# Patient Record
Sex: MALE | Race: BLACK OR AFRICAN AMERICAN | NOT HISPANIC OR LATINO | Employment: FULL TIME | URBAN - METROPOLITAN AREA
[De-identification: names, ages, dates, MRNs, and addresses within clinical notes are randomized per-mention and may not be internally consistent; named-entity substitution may affect disease eponyms.]

---

## 2022-08-08 ENCOUNTER — HOSPITAL ENCOUNTER (INPATIENT)
Facility: HOSPITAL | Age: 47
LOS: 14 days | Discharge: HOME | DRG: 065 | End: 2022-08-22
Attending: STUDENT IN AN ORGANIZED HEALTH CARE EDUCATION/TRAINING PROGRAM | Admitting: INTERNAL MEDICINE
Payer: COMMERCIAL

## 2022-08-08 DIAGNOSIS — R51.9 ACUTE NONINTRACTABLE HEADACHE, UNSPECIFIED HEADACHE TYPE: Primary | ICD-10-CM

## 2022-08-08 DIAGNOSIS — I60.9 SAH (SUBARACHNOID HEMORRHAGE): ICD-10-CM

## 2022-08-08 LAB
ABORH RETYPE: NORMAL
ALBUMIN SERPL-MCNC: 4.3 GM/DL (ref 3.5–5)
ALBUMIN/GLOB SERPL: 1.4 RATIO (ref 1.1–2)
ALP SERPL-CCNC: 57 UNIT/L (ref 40–150)
ALT SERPL-CCNC: 14 UNIT/L (ref 0–55)
AST SERPL-CCNC: 16 UNIT/L (ref 5–34)
BASOPHILS # BLD AUTO: 0.05 X10(3)/MCL (ref 0–0.2)
BASOPHILS NFR BLD AUTO: 0.5 %
BILIRUBIN DIRECT+TOT PNL SERPL-MCNC: 1.2 MG/DL
BUN SERPL-MCNC: 14.9 MG/DL (ref 8.9–20.6)
CALCIUM SERPL-MCNC: 9.1 MG/DL (ref 8.4–10.2)
CHLORIDE SERPL-SCNC: 104 MMOL/L (ref 98–107)
CHOLEST SERPL-MCNC: 178 MG/DL
CHOLEST/HDLC SERPL: 4 {RATIO} (ref 0–5)
CO2 SERPL-SCNC: 28 MMOL/L (ref 22–29)
CREAT SERPL-MCNC: 1.02 MG/DL (ref 0.73–1.18)
EOSINOPHIL # BLD AUTO: 0.04 X10(3)/MCL (ref 0–0.9)
EOSINOPHIL NFR BLD AUTO: 0.4 %
ERYTHROCYTE [DISTWIDTH] IN BLOOD BY AUTOMATED COUNT: 14.1 % (ref 11.5–17)
EST. AVERAGE GLUCOSE BLD GHB EST-MCNC: 114 MG/DL
GFR SERPLBLD CREATININE-BSD FMLA CKD-EPI: >60 MLS/MIN/1.73/M2
GLOBULIN SER-MCNC: 3.1 GM/DL (ref 2.4–3.5)
GLUCOSE SERPL-MCNC: 116 MG/DL (ref 70–110)
GLUCOSE SERPL-MCNC: 152 MG/DL (ref 74–100)
GROUP & RH: NORMAL
HBA1C MFR BLD: 5.6 %
HCT VFR BLD AUTO: 44.6 % (ref 42–52)
HDLC SERPL-MCNC: 46 MG/DL (ref 35–60)
HGB BLD-MCNC: 14 GM/DL (ref 14–18)
IMM GRANULOCYTES # BLD AUTO: 0.05 X10(3)/MCL (ref 0–0.04)
IMM GRANULOCYTES NFR BLD AUTO: 0.5 %
INDIRECT COOMBS GEL: NORMAL
INR BLD: 1.05 (ref 0–1.3)
LDLC SERPL CALC-MCNC: 119 MG/DL (ref 50–140)
LYMPHOCYTES # BLD AUTO: 1.21 X10(3)/MCL (ref 0.6–4.6)
LYMPHOCYTES NFR BLD AUTO: 12.4 %
MCH RBC QN AUTO: 26 PG (ref 27–31)
MCHC RBC AUTO-ENTMCNC: 31.4 MG/DL (ref 33–36)
MCV RBC AUTO: 82.7 FL (ref 80–94)
MONOCYTES # BLD AUTO: 0.37 X10(3)/MCL (ref 0.1–1.3)
MONOCYTES NFR BLD AUTO: 3.8 %
NEUTROPHILS # BLD AUTO: 8 X10(3)/MCL (ref 2.1–9.2)
NEUTROPHILS NFR BLD AUTO: 82.4 %
NRBC BLD AUTO-RTO: 0 %
PLATELET # BLD AUTO: 231 X10(3)/MCL (ref 130–400)
PMV BLD AUTO: 10.8 FL (ref 7.4–10.4)
POTASSIUM SERPL-SCNC: 3.3 MMOL/L (ref 3.5–5.1)
PROT SERPL-MCNC: 7.4 GM/DL (ref 6.4–8.3)
PROTHROMBIN TIME: 13.6 SECONDS (ref 12.5–14.5)
RBC # BLD AUTO: 5.39 X10(6)/MCL (ref 4.7–6.1)
SARS-COV-2 RDRP RESP QL NAA+PROBE: NEGATIVE
SODIUM SERPL-SCNC: 140 MMOL/L (ref 136–145)
TRIGL SERPL-MCNC: 67 MG/DL (ref 34–140)
TSH SERPL-ACNC: 0.87 UIU/ML (ref 0.35–4.94)
VLDLC SERPL CALC-MCNC: 13 MG/DL
WBC # SPEC AUTO: 9.7 X10(3)/MCL (ref 4.5–11.5)

## 2022-08-08 PROCEDURE — 85610 PROTHROMBIN TIME: CPT | Performed by: STUDENT IN AN ORGANIZED HEALTH CARE EDUCATION/TRAINING PROGRAM

## 2022-08-08 PROCEDURE — 96374 THER/PROPH/DIAG INJ IV PUSH: CPT

## 2022-08-08 PROCEDURE — 25000003 PHARM REV CODE 250: Performed by: INTERNAL MEDICINE

## 2022-08-08 PROCEDURE — 25000003 PHARM REV CODE 250: Performed by: STUDENT IN AN ORGANIZED HEALTH CARE EDUCATION/TRAINING PROGRAM

## 2022-08-08 PROCEDURE — 63600175 PHARM REV CODE 636 W HCPCS: Performed by: STUDENT IN AN ORGANIZED HEALTH CARE EDUCATION/TRAINING PROGRAM

## 2022-08-08 PROCEDURE — 99223 PR INITIAL HOSPITAL CARE,LEVL III: ICD-10-PCS | Mod: FS,,, | Performed by: NEUROLOGICAL SURGERY

## 2022-08-08 PROCEDURE — 96375 TX/PRO/DX INJ NEW DRUG ADDON: CPT

## 2022-08-08 PROCEDURE — 20000000 HC ICU ROOM

## 2022-08-08 PROCEDURE — 36415 COLL VENOUS BLD VENIPUNCTURE: CPT | Performed by: STUDENT IN AN ORGANIZED HEALTH CARE EDUCATION/TRAINING PROGRAM

## 2022-08-08 PROCEDURE — 80053 COMPREHEN METABOLIC PANEL: CPT | Performed by: STUDENT IN AN ORGANIZED HEALTH CARE EDUCATION/TRAINING PROGRAM

## 2022-08-08 PROCEDURE — 87635 SARS-COV-2 COVID-19 AMP PRB: CPT | Performed by: STUDENT IN AN ORGANIZED HEALTH CARE EDUCATION/TRAINING PROGRAM

## 2022-08-08 PROCEDURE — 96361 HYDRATE IV INFUSION ADD-ON: CPT

## 2022-08-08 PROCEDURE — 84443 ASSAY THYROID STIM HORMONE: CPT | Performed by: STUDENT IN AN ORGANIZED HEALTH CARE EDUCATION/TRAINING PROGRAM

## 2022-08-08 PROCEDURE — 83036 HEMOGLOBIN GLYCOSYLATED A1C: CPT | Performed by: STUDENT IN AN ORGANIZED HEALTH CARE EDUCATION/TRAINING PROGRAM

## 2022-08-08 PROCEDURE — 93010 EKG 12-LEAD: ICD-10-PCS | Mod: ,,, | Performed by: INTERNAL MEDICINE

## 2022-08-08 PROCEDURE — 80061 LIPID PANEL: CPT | Performed by: STUDENT IN AN ORGANIZED HEALTH CARE EDUCATION/TRAINING PROGRAM

## 2022-08-08 PROCEDURE — 93010 ELECTROCARDIOGRAM REPORT: CPT | Mod: ,,, | Performed by: INTERNAL MEDICINE

## 2022-08-08 PROCEDURE — 25500020 PHARM REV CODE 255: Performed by: STUDENT IN AN ORGANIZED HEALTH CARE EDUCATION/TRAINING PROGRAM

## 2022-08-08 PROCEDURE — 85025 COMPLETE CBC W/AUTO DIFF WBC: CPT | Performed by: STUDENT IN AN ORGANIZED HEALTH CARE EDUCATION/TRAINING PROGRAM

## 2022-08-08 PROCEDURE — 93005 ELECTROCARDIOGRAM TRACING: CPT

## 2022-08-08 PROCEDURE — 99291 CRITICAL CARE FIRST HOUR: CPT | Mod: 25

## 2022-08-08 PROCEDURE — 99223 1ST HOSP IP/OBS HIGH 75: CPT | Mod: FS,,, | Performed by: NEUROLOGICAL SURGERY

## 2022-08-08 PROCEDURE — 86901 BLOOD TYPING SEROLOGIC RH(D): CPT | Performed by: STUDENT IN AN ORGANIZED HEALTH CARE EDUCATION/TRAINING PROGRAM

## 2022-08-08 PROCEDURE — 92523 SPEECH SOUND LANG COMPREHEN: CPT

## 2022-08-08 PROCEDURE — 25000003 PHARM REV CODE 250: Performed by: NEUROLOGICAL SURGERY

## 2022-08-08 RX ORDER — NIMODIPINE 30 MG/1
60 CAPSULE, LIQUID FILLED ORAL EVERY 4 HOURS
Status: DISCONTINUED | OUTPATIENT
Start: 2022-08-08 | End: 2022-08-22 | Stop reason: HOSPADM

## 2022-08-08 RX ORDER — MORPHINE SULFATE 4 MG/ML
4 INJECTION, SOLUTION INTRAMUSCULAR; INTRAVENOUS
Status: COMPLETED | OUTPATIENT
Start: 2022-08-08 | End: 2022-08-08

## 2022-08-08 RX ORDER — ONDANSETRON 2 MG/ML
8 INJECTION INTRAMUSCULAR; INTRAVENOUS EVERY 6 HOURS PRN
Status: DISCONTINUED | OUTPATIENT
Start: 2022-08-08 | End: 2022-08-22 | Stop reason: HOSPADM

## 2022-08-08 RX ORDER — SODIUM CHLORIDE 9 MG/ML
INJECTION, SOLUTION INTRAVENOUS CONTINUOUS
Status: DISCONTINUED | OUTPATIENT
Start: 2022-08-08 | End: 2022-08-22 | Stop reason: HOSPADM

## 2022-08-08 RX ORDER — SODIUM CHLORIDE 0.9 % (FLUSH) 0.9 %
10 SYRINGE (ML) INJECTION
Status: DISCONTINUED | OUTPATIENT
Start: 2022-08-08 | End: 2022-08-22 | Stop reason: HOSPADM

## 2022-08-08 RX ORDER — LABETALOL HYDROCHLORIDE 5 MG/ML
10 INJECTION, SOLUTION INTRAVENOUS
Status: DISCONTINUED | OUTPATIENT
Start: 2022-08-08 | End: 2022-08-22 | Stop reason: HOSPADM

## 2022-08-08 RX ORDER — ONDANSETRON 2 MG/ML
4 INJECTION INTRAMUSCULAR; INTRAVENOUS
Status: COMPLETED | OUTPATIENT
Start: 2022-08-08 | End: 2022-08-08

## 2022-08-08 RX ORDER — HYDRALAZINE HYDROCHLORIDE 20 MG/ML
10 INJECTION INTRAMUSCULAR; INTRAVENOUS
Status: DISCONTINUED | OUTPATIENT
Start: 2022-08-08 | End: 2022-08-22 | Stop reason: HOSPADM

## 2022-08-08 RX ORDER — LEVETIRACETAM 500 MG/5ML
500 INJECTION, SOLUTION, CONCENTRATE INTRAVENOUS EVERY 12 HOURS
Status: DISCONTINUED | OUTPATIENT
Start: 2022-08-08 | End: 2022-08-22 | Stop reason: HOSPADM

## 2022-08-08 RX ORDER — SODIUM CHLORIDE 9 MG/ML
1000 INJECTION, SOLUTION INTRAVENOUS
Status: COMPLETED | OUTPATIENT
Start: 2022-08-08 | End: 2022-08-08

## 2022-08-08 RX ORDER — MORPHINE SULFATE 4 MG/ML
2 INJECTION, SOLUTION INTRAMUSCULAR; INTRAVENOUS EVERY 4 HOURS PRN
Status: DISCONTINUED | OUTPATIENT
Start: 2022-08-08 | End: 2022-08-22 | Stop reason: HOSPADM

## 2022-08-08 RX ORDER — ONDANSETRON 2 MG/ML
4 INJECTION INTRAMUSCULAR; INTRAVENOUS EVERY 6 HOURS PRN
Status: DISCONTINUED | OUTPATIENT
Start: 2022-08-08 | End: 2022-08-08

## 2022-08-08 RX ORDER — POTASSIUM CHLORIDE 20 MEQ/1
40 TABLET, EXTENDED RELEASE ORAL ONCE
Status: COMPLETED | OUTPATIENT
Start: 2022-08-08 | End: 2022-08-08

## 2022-08-08 RX ADMIN — NIMODIPINE 60 MG: 30 CAPSULE, LIQUID FILLED ORAL at 06:08

## 2022-08-08 RX ADMIN — MORPHINE SULFATE 2 MG: 4 INJECTION INTRAVENOUS at 02:08

## 2022-08-08 RX ADMIN — NIMODIPINE 60 MG: 30 CAPSULE, LIQUID FILLED ORAL at 02:08

## 2022-08-08 RX ADMIN — NIMODIPINE 60 MG: 30 CAPSULE, LIQUID FILLED ORAL at 09:08

## 2022-08-08 RX ADMIN — ONDANSETRON 4 MG: 2 INJECTION INTRAMUSCULAR; INTRAVENOUS at 04:08

## 2022-08-08 RX ADMIN — NIMODIPINE 60 MG: 30 CAPSULE, LIQUID FILLED ORAL at 10:08

## 2022-08-08 RX ADMIN — SODIUM CHLORIDE: 9 INJECTION, SOLUTION INTRAVENOUS at 05:08

## 2022-08-08 RX ADMIN — LEVETIRACETAM 500 MG: 100 INJECTION, SOLUTION, CONCENTRATE INTRAVENOUS at 09:08

## 2022-08-08 RX ADMIN — ONDANSETRON 8 MG: 2 INJECTION INTRAMUSCULAR; INTRAVENOUS at 09:08

## 2022-08-08 RX ADMIN — NIMODIPINE 60 MG: 30 CAPSULE, LIQUID FILLED ORAL at 05:08

## 2022-08-08 RX ADMIN — HYDRALAZINE HYDROCHLORIDE 10 MG: 20 INJECTION INTRAMUSCULAR; INTRAVENOUS at 09:08

## 2022-08-08 RX ADMIN — ONDANSETRON 4 MG: 2 INJECTION INTRAMUSCULAR; INTRAVENOUS at 06:08

## 2022-08-08 RX ADMIN — ONDANSETRON 4 MG: 2 INJECTION INTRAMUSCULAR; INTRAVENOUS at 02:08

## 2022-08-08 RX ADMIN — IOPAMIDOL 100 ML: 755 INJECTION, SOLUTION INTRAVENOUS at 05:08

## 2022-08-08 RX ADMIN — SODIUM CHLORIDE 1000 ML: 9 INJECTION, SOLUTION INTRAVENOUS at 04:08

## 2022-08-08 RX ADMIN — POTASSIUM CHLORIDE 40 MEQ: 1500 TABLET, EXTENDED RELEASE ORAL at 12:08

## 2022-08-08 RX ADMIN — MORPHINE SULFATE 4 MG: 4 INJECTION INTRAVENOUS at 04:08

## 2022-08-08 RX ADMIN — ONDANSETRON 4 MG: 2 INJECTION INTRAMUSCULAR; INTRAVENOUS at 12:08

## 2022-08-08 NOTE — H&P
[facility] - Emergency Dept  Pulmonary Critical Care Note    Patient Name: Canelo Blank  MRN: [id number]  Admission Date: 8/8/2022  Hospital Length of Stay: 0 days  Code Status: Full Code  Attending Provider:Angy Wong MD  Primary Care Provider: No primary care provider on file.     Subjective:     HPI:   46 AAF with no reported PMH presented for sudden-on headache. Patient was watching TV, laying in bed around 12AM when he started experiencing intense pressure/pain in the occipital region radiating to the neck a/w dizziness/N/V for which he took 2 enma's aspirin and heating pack with some improvement, but ultimately decided to come to the ED accompanied by wife due to persistence of HA. No prior episodes. No bowel or bladder dysfunction. In the ED imaging CTH revealed subtle SAH with obstructing hydrocephalus above 4th ventricle. NSGY, Dr. Buchanan consulted by ED physician, recommended CTA head and neck for now and ICU admission for q1h neurochecks.     Hospital Course/Significant events: HA improved with one time administration of morphine 4mg IV, nausea better controlled with Zofran administration.       24 Hour Interval History: N/A      No past medical history on file.    No past surgical history on file.     Social History     Socioeconomic History    Marital status:            Objective:   No current outpatient medications    Current Inpatient Medications   niMODipine  60 mg Oral Q4H     Infusions    PRN  sodium chloride 0.9%      Intake/Output Summary (Last 24 hours) at 8/8/2022 0558  Last data filed at 8/8/2022 0529  Gross per 24 hour   Intake 0 ml   Output --   Net 0 ml       Review of Systems   Constitutional: Positive for diaphoresis. Negative for chills and fever.   HENT: Negative for hearing loss and tinnitus.    Eyes: Negative for blurred vision, double vision, photophobia and pain.   Respiratory: Negative for cough and shortness of breath.    Cardiovascular: Negative for chest  pain and palpitations.   Gastrointestinal: Positive for nausea and vomiting.   Genitourinary: Negative for dysuria, frequency and urgency.   Musculoskeletal: Positive for neck pain.   Neurological: Positive for headaches. Negative for dizziness, tingling, focal weakness, seizures, loss of consciousness and weakness.   Psychiatric/Behavioral: Negative for hallucinations. The patient is not nervous/anxious.           Vital Signs (Most Recent):  Temp: 98.1 °F (36.7 °C) (08/08/22 0419)  Pulse: 69 (08/08/22 0501)  Resp: 19 (08/08/22 0445)  BP: (!) 153/102 (08/08/22 0547)  SpO2: 99 % (08/08/22 0501)  Body mass index is 25.31 kg/m².  Weight: 68.9 kg (151 lb 14.4 oz) Vital Signs (24h Range):  Temp:  [98.1 °F (36.7 °C)] 98.1 °F (36.7 °C)  Pulse:  [69-76] 69  Resp:  [19-20] 19  SpO2:  [99 %-100 %] 99 %  BP: (141-156)/() 153/102     Physical Exam  General:  Well-nourished, well-developed, no acute distress, slightly uncomfortable appearing due to nausea  HEENT: Normocephalic, atraumatic. EOMI.   Neck: Supple. No obvious JVD.  Normal range of motion.  Respiratory: CTAB.  No obvious crackles wheeze or rhonchi.  Normal work of breathing.  Cardiovascular:  RRR.  No obvious M/G/R. Warm extremities.  Peripheral pulses intact.  No edema.  Gastrointestinal:  Soft, nontender, nondistended.  Normal bowel sounds.  Neurologic: ANOx3.  Answering questions/following commands appropriately.  No FND.  HOB elevated.      Mechanical ventilation support: on room air       Lines/Drains/Airways     Peripheral Intravenous Line  Duration                Peripheral IV - Single Lumen 08/08/22 0435 20 G Anterior;Proximal;Right Forearm <1 day                Significant Labs:    Lab Results   Component Value Date    WBC 9.7 08/08/2022    HGB 14.0 08/08/2022    HCT 44.6 08/08/2022    MCV 82.7 08/08/2022     08/08/2022         BMP  Lab Results   Component Value Date     08/08/2022    K 3.3 (L) 08/08/2022    CO2 28 08/08/2022    BUN 14.9  08/08/2022    CREATININE 1.02 08/08/2022    CALCIUM 9.1 08/08/2022       ABG  No results for input(s): PH, PO2, PCO2, HCO3, BE in the last 168 hours.        Significant Imaging:  I have reviewed all pertinent imaging within the past 24 hours.    CT Head Without Contrast         CTA Head and Neck (xpd)    (Results Pending)   X-Ray Chest 1 View    (Results Pending)       Microbiology Results (last 7 days)     ** No results found for the last 168 hours. **          Assessment/Plan:     Assessment    1. Small SAH with evidence of obstruction hydrocephalus given blood in 4th ventricle and enlarged 3rd/4th ventricle  a. Associated with sudden on-set headache, N/V    Plan  -Continue close hemodynamic monitoring and q1h neurochecks in the ICU  -CTA ordered, pending read  -Nimodipine to prevent vasospasms  -Keep SBP between 140-160s ( Can initiate Cleviprex gtt if PRN ineffective, BP at goal currently  -Elevated HOB 30 deg, NPO until evaluated by NSGY ( Dr. Buchanan)  -Seizure ppx with Keppra 500 BID for now  -Zofran for antiemetic, morphine for pain control    Consults: NSGY  DVT Prophylaxis:SCDS, holding AC due to risk of evolving SAH  GI Prophylaxis: none    CODE STATUS: FULL    Greater than 30 minutes of critical care was time spent personally by me on the following activities: development of treatment plan with patient or surrogate and bedside caregivers, discussions with consultants, evaluation of patient's response to treatment, examination of patient, ordering and performing treatments and interventions, ordering and review of laboratory studies, ordering and review of radiographic studies, pulse oximetry, re-evaluation of patient's condition.  This critical care time did not overlap with that of any other provider or involve time for any procedures.     Jailyn Rodrigues MD  [facility]  Internal Medicine Resident PGY-3

## 2022-08-08 NOTE — PLAN OF CARE
Received request from MD to initiate transfer to [facility] for higher level of care. Spoke with Altagracia at transfer center [facility] who took all the requested information and states someone will contact Dr. Buchanan directly for further information. Awaiting further advisement.

## 2022-08-08 NOTE — CONSULTS
[facility] - [facility] East ICU  Neurosurgery  Consult Note    Inpatient consult to Neurosurgery  Consult performed by: RACHEL Chen  Consult ordered by: Jailyn Rodrigues MD  Reason for consult: SAH        Subjective:     Chief Complaint/Reason for Admission: HA    History of Present Illness: Patient is a 45yo male with no significant PMHX, who presented to ED this am with c/o pressure to the back of his head that began suddenly around 0030 this am, waking him from his sleep. He reports the pain from his head radiated down to his shoulders. He did take some medication and laid back down. Pt reports he felt better after this, but then the pain to his head soon returned. He reports 3 episodes of vomiting. He denies any previous neck/back sx, vision changes, or any other medical hx. Pt also denies ETOH, smoking, or drugs.     On arrival to the ED a CT head revealed SAH in the prepontine cistern and sylvian fissures with a small volume IVH. Dr. Buchanan was consulted for evaluation and treatment recommendations.    On PE this am he is lying in bed, NAD. His HA is currently controlled. He denies blurred vision. He has not vomited since his arrival. He has no other complaints. BP has been 140-150 systolic. GCS 15    Review of patient's allergies indicates:  No Known Allergies    No past medical history on file.  No past surgical history on file.  Family History    None       Tobacco Use    Smoking status: Not on file    Smokeless tobacco: Not on file   Substance and Sexual Activity    Alcohol use: Not on file    Drug use: Not on file    Sexual activity: Not on file     Review of Systems  Objective:     12 pt ROS WNL, except for HPI    Weight: 68.9 kg (151 lb 14.4 oz)  Body mass index is 25.28 kg/m².  Vital Signs (Most Recent):  Temp: 97.7 °F (36.5 °C) (08/08/22 0635)  Pulse: 78 (08/08/22 0635)  Resp: 16 (08/08/22 0635)  BP: (!) 148/98 (08/08/22 0635)  SpO2: 99 % (08/08/22 0722) Vital Signs (24h  Range):  Temp:  [97.7 °F (36.5 °C)-98.1 °F (36.7 °C)] 97.7 °F (36.5 °C)  Pulse:  [69-78] 78  Resp:  [14-20] 16  SpO2:  [99 %-100 %] 99 %  BP: (141-166)/() 148/98         Physical Exam:    Constitutional: He appears well-developed and well-nourished. No distress.     Eyes: Pupils are equal, round, and reactive to light. EOM are normal.   Fundoscopic exam:       The right eye shows no hemorrhage.        The left eye shows no hemorrhage.     Cardiovascular: Normal rate, regular rhythm and intact distal pulses.     Abdominal: Soft. Bowel sounds are normal.     Psych/Behavior: He is alert. He is oriented to person, place, and time. He has a normal mood and affect.     Musculoskeletal:        Neck: Range of motion is full.        Back: Range of motion is full.        Right Upper Extremities: Range of motion is full. Muscle strength is 5/5.        Left Upper Extremities: Range of motion is full. Muscle strength is 5/5.       Right Lower Extremities: Range of motion is full. Muscle strength is 5/5.        Left Lower Extremities: Range of motion is full. Muscle strength is 5/5.     Neurological:        DTRs: DTRs are DTRS NORMAL AND SYMMETRICnormal and symmetric.        Cranial nerves: Cranial nerve(s) II, III, IV, V, VI, VII, VIII, IX, X, XI and XII are intact.   Neg pronator drift      Significant Labs:  Recent Labs   Lab 08/08/22 0437      K 3.3*   CO2 28   BUN 14.9   CREATININE 1.02   CALCIUM 9.1     Recent Labs   Lab 08/08/22 0437   WBC 9.7   HGB 14.0   HCT 44.6        Recent Labs   Lab 08/08/22  0516   INR 1.05     Microbiology Results (last 7 days)     ** No results found for the last 168 hours. **          Significant Diagnostics:  CT Head Without Contrast [[id number]] Resulted: 08/08/22 0930   Order Status: Completed Updated: 08/08/22 0933   Narrative:     EXAMINATION:   CT HEAD WITHOUT CONTRAST     CLINICAL HISTORY:   Subarachnoid hemorrhage (SAH) suspected;HA x 3.5 hours, N/V x3;     TECHNIQUE:    Axial scans were obtained from skull base to the vertex.     Coronal and sagittal reconstructions obtained from the axial data.     Automatic exposure control was utilized to limit radiation dose.     Contrast: None     Radiation Dose:     Total DLP: 1013 mGy*cm     COMPARISON:   None     FINDINGS:   There is subarachnoid hemorrhage centered in the prepontine cistern, with small amount extending into the bilateral sylvian fissures.  Small amount of hemorrhage is seen in the 4th ventricle.  The gray-white matter differentiation is preserved     There is sulcal effacement and mild dilatation of the lateral ventricles concerning for developing hydrocephalus.  There is no midline shift or downward herniation.     The calvarium and skull base are intact.  There is trace scattered paranasal sinus mucosal thickening.  The mastoid air cells are clear.    Impression:       Subarachnoid hemorrhage in the prepontine cistern and sylvian fissures, small volume intraventricular hemorrhage and developing hydrocephalus.     No significant change from the [facility] interpretation.      CTA Head and Neck (xpd) [[id number]] Resulted: 08/08/22 0546   Order Status: Completed Updated: 08/08/22 0633   Narrative:     START OF REPORT:   Technique: CT angiogram of the intracranial vessels was performed with intravenous contrast.     Comparison: None.     Clinical history: Headache.     Findings:   Intracranial Vascular structures: Unremarkable.   Internal carotid arteries: Unremarkable.   Middle cerebral arteries: Unremarkable.   Anterior cerebral arteries: Unremarkable.   Vertebral arteries: Unremarkable.   Basilar artery: Unremarkable.   Posterior cerebral arteries: Fetal origin of the bilateral posterior cerebral artery is seen with hypoplastic P1 segment on the left. There is some irregularity of the fetal originating left posterior cerebral artery off the left internal carotid with 2 small irregular saccular foci best seen on on image  153 of series 7 proximal to the convergence with the left P1 segment of the left posterior cerebral artery.   Jugular Veins and venous sinuses: Unremarkable.   Neck Vascular structures: The visualized aorta and origin of the great vessels of the neck appear unremarkable.   Carotids: Unremarkable.   Common carotid arteries: Unremarkable.   Internal carotid artery: Unremarkable.   Vertebral arteries: Unremarkable.   Jugular Veins and venous sinuses: Unremarkable.   Hemorrhage: Again seen is the stable appearing subarachnoid hemorrhage in the prepontine cistern and in the fourth ventricle. There continues to be prominence of the lateral third and fourth ventricles which may reflect early obstructive hydrocephalus.   Brain parenchyma: No abnormal intracranial enhancement is identified.     Notifications: The results were discussed with the emergency room physician prior to dictation at 2022-08-08 06:16:04 CDT.       Impression:   1. There is some irregularity of the fetal originating left posterior cerebral artery off the left internal carotid with 2 small irregular saccular foci best seen on on image 153 of series 7 proximal to the convergence with the left P1 segment of the left posterior cerebral artery. One or both of these may reflect tiny aneurysms. Correlate clinically as regards additional evaluation and follow-up.   2. Again seen is the stable appearing subarachnoid hemorrhage in the prepontine cistern and in the fourth ventricle. There continues to be prominence of the lateral third and fourth ventricles which may reflect early obstructive hydrocephalus. Correlate clinically and recommend continued serial interval follow-up as indicated.   3. Details and other findings as described above.          Assessment/Plan:     He is currently GCS 15.  CTA reviewed by Dr. Buchanan who recommends transfer for interventional neurology as Dr. Ivory is not available today.  We will continue Q1 hour neuro checks  BP  parameters below 120 systolic  Keppra BID  Nimotop Q4  NS at 100/hr  Case management consulted for transfer; currently working on this  Further recs to follow per Dr. Buchanan    Thank you for your consult.     RACHEL Chen  Neurosurgery  [facility] - [facility] ICU

## 2022-08-08 NOTE — PT/OT/SLP EVAL
Speech Language Pathology Department  Cognitive-Communication Evaluation    Patient Name:  Canelo Blank   MRN:  [id number]  Admitting Diagnosis: <principal problem not specified>    Recommendations:     General Recommendations:  SLP intervention not indicated  Communication strategies:  none  Discharge recommendations:      Barriers to Discharge:  None    History:     No past medical history on file.    Past Surgical History:   Procedure Laterality Date    APPENDECTOMY      FRACTURE SURGERY Right     pinky finger       Previous level of Function   Education:college   Occupation: full time job doing [employer]    Lives: with significant other   Handed: Right   Glasses: yes   Hearing Aids: no      Subjective     Patient awake and alert.      Objective:     SPEECH PRODUCTION   Phoneme Production: wfl   Voice Quality: wfl   Voice Production: wfl   Speech Rate: wfl   Loudness: wfl   Respiration: wfl   Resonance: wfl   Speech Intelligibility  Known Context: Greater that 90%  Unknown Context: Greater that 90%    AUDITORY COMPREHENSION  Identification:   Body parts: 100%   Objects: 100%  Following Directions:   1-Step: 100%   2-Step: 100%  Yes/No Questions:   Biographical: 100%   Environmental: 100%    VERBAL EXPRESSION  Automatic Speech:   Functional needs: 100%   Days of the week: 100%   Months of the year: 100%  Confrontation Naming   Body Parts: 100%   Objects: 100%  Wh- Questions:   Object name: 100%   Object function: 100%    COGNITION  Orientation:   Person: 100%   Place: 100%   Time: 100%  Memory:   Immediate: 100%   Delayed: 100%  Problem Solving   Functional simple: 100%   Functional complex: 100%   Money Management: 100%  Organization:   Convergent thinkin%   Divergent thinkin%  Executive Function:   Attention to detail: 100%   Awareness: 100%   Cognitive endurance: 100%    Assessment:     Pt presents with functional speech and language skills, cognitive linguistic  skills note to be at baseline. No skilled SLP intervention is warranted at this time.     Goals:   Multidisciplinary Problems     SLP Goals     Not on file                Plan:     Patient to be seen:      Plan of Care expires:     Plan of Care reviewed with:  patient   SLP Follow-Up:          Time Tracking:     SLP Treatment Date:    8/8/22  Speech Start Time:     Speech Stop Time:        Speech Total Time (min):       Billable minutes:  Evaluation of Speech Sound Production with Comprehension and Expression, 15       08/08/2022

## 2022-08-08 NOTE — PLAN OF CARE
Spoke to Altagracia with [facility] transfer center. She states that the transfer has been accepted but somehow the two ICU beds were that were available this morning were filled without reserving one for this pt. She states their critical care unit currently has nine potential discharges for today and Mr. Blank will definitely get one of these beds. She also states that this if a bed is not available very soon they will plan to transfer the pt to [facility] ED for a short time to allow for consult assessments to begin while waiting on bed availability. I verified with her that there is nothing else that she needs me to do regarding this and I informed the pt's nurse.

## 2022-08-08 NOTE — ED PROVIDER NOTES
Encounter Date: 8/8/2022    SCRIBE #1 NOTE: I, Philippe Jacobs, am scribing for, and in the presence of,  Serafin Rogers MD. I have scribed the following portions of the note - Other sections scribed: HPI, ROS, PE, MDM.       History     Chief Complaint   Patient presents with    Headache     Complaint of new onset headache, with pressure. Vomited x 1 prior to arrival.  Symptoms started at 0030     I, Serafin Rogers MD assumed care at 0430    47 y/o M presents to ED with c/o pressure to the back of his head that onset all of a sudden around 0030 to 0100. He reports the pain from his head radiated down to his shoulders so he then took some medication  laid down. Pt reports he felt better after this, but then the pain to his head soon returned again. Pt states vomiting 3 times. He denies any previous neck/back sx, vision changes, or any other medical hx. Pt also denies ETOH, smoking, or drugs.     The history is provided by the patient. No  was used.   Headache   This is a new problem. The current episode started today. The problem has been unchanged. The pain is located in the occipital region. The pain radiates to the right shoulder and left shoulder. The pain quality is not similar to prior headaches. The quality of the pain is described as pressure-like. Associated symptoms include nausea and vomiting. Pertinent negatives include no abdominal pain, back pain, eye pain, eye redness, fever, neck pain, numbness, sore throat, visual change or weakness. Nothing aggravates the symptoms. He has tried nothing for the symptoms. The treatment provided no relief.     Review of patient's allergies indicates:  No Known Allergies  No past medical history on file.  No past surgical history on file.  No family history on file.     Review of Systems   Constitutional: Negative.  Negative for chills and fever.   HENT: Negative.  Negative for drooling and sore throat.    Eyes: Negative.  Negative for pain and  redness.   Respiratory: Negative.  Negative for shortness of breath, wheezing and stridor.    Cardiovascular: Negative.  Negative for chest pain, palpitations and leg swelling.   Gastrointestinal: Positive for nausea and vomiting. Negative for abdominal pain.   Endocrine: Negative.    Genitourinary: Negative.  Negative for dysuria and hematuria.   Musculoskeletal: Negative.  Negative for back pain, neck pain and neck stiffness.   Skin: Negative.  Negative for rash and wound.   Allergic/Immunologic: Negative.    Neurological: Positive for headaches. Negative for weakness and numbness.   Hematological: Negative.  Does not bruise/bleed easily.   Psychiatric/Behavioral: Negative.        Physical Exam     Initial Vitals [08/08/22 0419]   BP Pulse Resp Temp SpO2   (!) 151/92 75 20 98.1 °F (36.7 °C) 99 %      MAP       --         Physical Exam    Nursing note and vitals reviewed.  Constitutional: He appears well-developed. He is not diaphoretic. No distress.   HENT:   Head: Normocephalic and atraumatic.   Nose: Nose normal.   Mouth/Throat: Oropharynx is clear and moist.   Eyes: Conjunctivae and EOM are normal. Pupils are equal, round, and reactive to light.   Neck: Neck supple.   Normal range of motion.  Cardiovascular: Normal rate and regular rhythm.   No murmur heard.  Pulmonary/Chest: Breath sounds normal. No respiratory distress. He has no wheezes. He has no rales.   Abdominal: Abdomen is soft. He exhibits no distension. There is no abdominal tenderness.   Musculoskeletal:         General: No edema. Normal range of motion.      Cervical back: Normal range of motion and neck supple.     Neurological: He is alert and oriented to person, place, and time. He has normal strength. No cranial nerve deficit. GCS score is 15. GCS eye subscore is 4. GCS verbal subscore is 5. GCS motor subscore is 6.   Normal FNF   Skin: Skin is warm. Capillary refill takes less than 2 seconds. No rash noted.   Psychiatric: He has a normal mood  and affect. His behavior is normal.         ED Course   Critical Care    Date/Time: 8/8/2022 6:26 AM  Performed by: Serafin Rogers MD  Authorized by: Wes Roger MD   Total critical care time (exclusive of procedural time) : 40 minutes  Critical care time was exclusive of separately billable procedures and treating other patients.  Critical care was necessary to treat or prevent imminent or life-threatening deterioration of the following conditions: CNS failure or compromise.  Critical care was time spent personally by me on the following activities: blood draw for specimens, development of treatment plan with patient or surrogate, discussions with consultants, evaluation of patient's response to treatment, examination of patient, obtaining history from patient or surrogate, ordering and performing treatments and interventions, ordering and review of radiographic studies, ordering and review of laboratory studies, pulse oximetry, review of old charts and re-evaluation of patient's condition.        Labs Reviewed   COMPREHENSIVE METABOLIC PANEL - Abnormal; Notable for the following components:       Result Value    Potassium Level 3.3 (*)     Glucose Level 152 (*)     All other components within normal limits   CBC WITH DIFFERENTIAL - Abnormal; Notable for the following components:    MCH 26.0 (*)     MCHC 31.4 (*)     MPV 10.8 (*)     IG# 0.05 (*)     All other components within normal limits   PROTIME-INR - Normal   CBC W/ AUTO DIFFERENTIAL    Narrative:     The following orders were created for panel order CBC auto differential.  Procedure                               Abnormality         Status                     ---------                               -----------         ------                     CBC with Differential[[id number]]        Abnormal            Final result                 Please view results for these tests on the individual orders.   LIPID PANEL   HEMOGLOBIN A1C   TSH   TYPE & SCREEN    POCT GLUCOSE MONITORING CONTINUOUS          Imaging Results          X-Ray Chest 1 View (In process)                CTA Head and Neck (xpd) (Preliminary result)  Result time 08/08/22 05:46:48    Preliminary result by Interface, Rad Results In (08/08/22 05:46:48)                 Narrative:    START OF REPORT:  Technique: CT angiogram of the intracranial vessels was performed with intravenous contrast.    Comparison: None.    Clinical history: Headache.    Findings:  Intracranial Vascular structures: Unremarkable.  Internal carotid arteries: Unremarkable.  Middle cerebral arteries: Unremarkable.  Anterior cerebral arteries: Unremarkable.  Vertebral arteries: Unremarkable.  Basilar artery: Unremarkable.  Posterior cerebral arteries: Fetal origin of the bilateral posterior cerebral artery is seen with hypoplastic P1 segment on the left. There is some irregularity of the fetal originating left posterior cerebral artery off the left internal carotid with 2 small irregular saccular foci best seen on on image 153 of series 7 proximal to the convergence with the left P1 segment of the left posterior cerebral artery.  Jugular Veins and venous sinuses: Unremarkable.  Neck Vascular structures: The visualized aorta and origin of the great vessels of the neck appear unremarkable.  Carotids: Unremarkable.  Common carotid arteries: Unremarkable.  Internal carotid artery: Unremarkable.  Vertebral arteries: Unremarkable.  Jugular Veins and venous sinuses: Unremarkable.  Hemorrhage: Again seen is the stable appearing subarachnoid hemorrhage in the prepontine cistern and in the fourth ventricle. There continues to be prominence of the lateral third and fourth ventricles which may reflect early obstructive hydrocephalus.  Brain parenchyma: No abnormal intracranial enhancement is identified.    Notifications: The results were discussed with the emergency room physician prior to dictation at 2022-08-08 06:16:04 CDT.      Impression:  1.  There is some irregularity of the fetal originating left posterior cerebral artery off the left internal carotid with 2 small irregular saccular foci best seen on on image 153 of series 7 proximal to the convergence with the left P1 segment of the left posterior cerebral artery. One or both of these may reflect tiny aneurysms. Correlate clinically as regards additional evaluation and follow-up.  2. Again seen is the stable appearing subarachnoid hemorrhage in the prepontine cistern and in the fourth ventricle. There continues to be prominence of the lateral third and fourth ventricles which may reflect early obstructive hydrocephalus. Correlate clinically and recommend continued serial interval follow-up as indicated.  3. Details and other findings as described above.                      Preliminary result by Bora Loyd MD (08/08/22 05:46:48)                 Narrative:    START OF REPORT:  Technique: CT angiogram of the intracranial vessels was performed with intravenous contrast.    Comparison: None.    Clinical history: Headache.    Findings:  Intracranial Vascular structures: Unremarkable.  Internal carotid arteries: Unremarkable.  Middle cerebral arteries: Unremarkable.  Anterior cerebral arteries: Unremarkable.  Vertebral arteries: Unremarkable.  Basilar artery: Unremarkable.  Posterior cerebral arteries: Fetal origin of the bilateral posterior cerebral artery is seen with hypoplastic P1 segment on the left. There is some irregularity of the fetal originating left posterior cerebral artery off the left internal carotid with 2 small irregular saccular foci best seen on on image 153 of series 7 proximal to the convergence with the left P1 segment of the left posterior cerebral artery.  Jugular Veins and venous sinuses: Unremarkable.  Neck Vascular structures: The visualized aorta and origin of the great vessels of the neck appear unremarkable.  Carotids: Unremarkable.  Common carotid arteries:  Unremarkable.  Internal carotid artery: Unremarkable.  Vertebral arteries: Unremarkable.  Jugular Veins and venous sinuses: Unremarkable.  Hemorrhage: Again seen is the stable appearing subarachnoid hemorrhage in the prepontine cistern and in the fourth ventricle. There continues to be prominence of the lateral third and fourth ventricles which may reflect early obstructive hydrocephalus.  Brain parenchyma: No abnormal intracranial enhancement is identified.    Notifications: The results were discussed with the emergency room physician prior to dictation at 2022-08-08 06:16:04 CDT.      Impression:  1. There is some irregularity of the fetal originating left posterior cerebral artery off the left internal carotid with 2 small irregular saccular foci best seen on on image 153 of series 7 proximal to the convergence with the left P1 segment of the left posterior cerebral artery. One or both of these may reflect tiny aneurysms. Correlate clinically as regards additional evaluation and follow-up.  2. Again seen is the stable appearing subarachnoid hemorrhage in the prepontine cistern and in the fourth ventricle. There continues to be prominence of the lateral third and fourth ventricles which may reflect early obstructive hydrocephalus. Correlate clinically and recommend continued serial interval follow-up as indicated.  3. Details and other findings as described above.                                 CT Head Without Contrast (Preliminary result)  Result time 08/08/22 04:46:56    Preliminary result by Interface, Rad Results In (08/08/22 04:46:56)                 Narrative:    START OF REPORT:  Technique: CT of the head was performed without intravenous contrast with axial as well as coronal and sagittal images.    Comparison: None.    Dosage Information: Automated exposure control was utilized.    Clinical history: Severe headache.    Findings:  Hemorrhage: Subtle subarachnoid hemorrhage is noted in the  perimesencephalic and prepontine cistern (series 2; images 10 and 11 and series 9; image 32). There is also blood in the 4th ventricle (series 2; image 9 , series 9 image 32).  CSF spaces: The lateral ventricles and third and fourth ventricles appear enlarged out of proportion to the sulci particularly in a patient of this age. Given the blood in the inferior fourth ventricle this suggests obstructive hydrocephalus.  Brain parenchyma: Unremarkable with preservation of the grey white junction throughout.  Cerebellum: Unremarkable.  Sella and skull base: The sella appears to be within normal limits for age.  Herniation: None.  Intracranial calcifications: Incidental note is made of bilateral choroid plexus calcification. Incidental note is made of some pineal region calcification.  Calvarium: No acute linear or depressed skull fracture is seen.    Maxillofacial Structures:  Paranasal sinuses: The visualized paranasal sinuses appear clear with no significant mucoperiosteal thickening or air fluid levels identified.  Orbits: The orbits appear unremarkable.  Zygomatic arches: The zygomatic arches are intact and unremarkable.  Temporal bones and mastoids: The temporal bones and mastoids appear unremarkable.  TMJ: The mandibular condyles appear normally placed with respect to the mandibular fossa.    Notifications: The results were discussed with the emergency room physician (Dr Rogers) prior to dictation at 2022-08-08 05:11:57 CDT.      Impression:  1. Subtle subarachnoid hemorrhage is noted in the perimesencephalic and prepontine cistern (series 2; images 10 and 11 and series 9; image 32). There is also blood in the 4th ventricle (series 2; image 9 , series 9 image 32).  2. The lateral ventricles and third and fourth ventricles appear enlarged out of proportion to the sulci particularly in a patient of this age. Given the blood in the inferior fourth ventricle this suggests obstructive hydrocephalus. Correlate with  clinical and laboratory findings and recommend continued serial interval follow-up as indicated.  3. Details and other findings as noted above.                      Preliminary result by Bora Loyd MD (08/08/22 04:46:56)                 Narrative:    START OF REPORT:  Technique: CT of the head was performed without intravenous contrast with axial as well as coronal and sagittal images.    Comparison: None.    Dosage Information: Automated exposure control was utilized.    Clinical history: Severe headache.    Findings:  Hemorrhage: Subtle subarachnoid hemorrhage is noted in the perimesencephalic and prepontine cistern (series 2; images 10 and 11 and series 9; image 32). There is also blood in the 4th ventricle (series 2; image 9 , series 9 image 32).  CSF spaces: The lateral ventricles and third and fourth ventricles appear enlarged out of proportion to the sulci particularly in a patient of this age. Given the blood in the inferior fourth ventricle this suggests obstructive hydrocephalus.  Brain parenchyma: Unremarkable with preservation of the grey white junction throughout.  Cerebellum: Unremarkable.  Sella and skull base: The sella appears to be within normal limits for age.  Herniation: None.  Intracranial calcifications: Incidental note is made of bilateral choroid plexus calcification. Incidental note is made of some pineal region calcification.  Calvarium: No acute linear or depressed skull fracture is seen.    Maxillofacial Structures:  Paranasal sinuses: The visualized paranasal sinuses appear clear with no significant mucoperiosteal thickening or air fluid levels identified.  Orbits: The orbits appear unremarkable.  Zygomatic arches: The zygomatic arches are intact and unremarkable.  Temporal bones and mastoids: The temporal bones and mastoids appear unremarkable.  TMJ: The mandibular condyles appear normally placed with respect to the mandibular fossa.    Notifications: The results were discussed  with the emergency room physician (Dr Rogers) prior to dictation at 2022-08-08 05:11:57 CDT.      Impression:  1. Subtle subarachnoid hemorrhage is noted in the perimesencephalic and prepontine cistern (series 2; images 10 and 11 and series 9; image 32). There is also blood in the 4th ventricle (series 2; image 9 , series 9 image 32).  2. The lateral ventricles and third and fourth ventricles appear enlarged out of proportion to the sulci particularly in a patient of this age. Given the blood in the inferior fourth ventricle this suggests obstructive hydrocephalus. Correlate with clinical and laboratory findings and recommend continued serial interval follow-up as indicated.  3. Details and other findings as noted above.                                   Medications   niMODipine capsule 60 mg (60 mg Oral Given 8/8/22 0547)   sodium chloride 0.9% flush 10 mL (has no administration in time range)   hydrALAZINE injection 10 mg (has no administration in time range)   labetaloL injection 10 mg (has no administration in time range)   levETIRAcetam injection 500 mg (has no administration in time range)   ondansetron injection 4 mg (4 mg Intravenous Given 8/8/22 0629)   0.9%  NaCl infusion (0 mLs Intravenous Stopped 8/8/22 0529)   morphine injection 4 mg (4 mg Intravenous Given 8/8/22 0445)   ondansetron injection 4 mg (4 mg Intravenous Given 8/8/22 0445)   iopamidoL (ISOVUE-370) injection 100 mL (100 mLs Intravenous Given 8/8/22 0547)     Medical Decision Making:   Differential Diagnosis:   SAH, ICH, CVA, TIA, tension headache, migraine  Independently Interpreted Test(s):   I have ordered and independently interpreted X-rays - see prior notes.  I have ordered and independently interpreted EKG Reading(s) - see prior notes  Clinical Tests:   Lab Tests: Ordered and Reviewed  Radiological Study: Ordered and Reviewed  Medical Tests: Ordered and Reviewed  ED Management:  MDM: Canelo Blank is a 46 y.o. male with above past  medical history who presents to the ED for headache. Vital signs reviewed. Afebrile, HDS. GCS15, neurointact. However, given acute onset of headache and no history of similar HA in the past, will obtain CTH to r/o ICH. Labs pending, pain and nausea control PRN. Patient agrees with plan and all questions answered at bedside.    Update: CTH shows small SAH involving 4th ventricle. CTA ordered. Patient updated. Neurosurgery consulted (Dewayne), will evaluate the patient. ICU consulted for admission, case discussed, and patient accepted.    Dispo: Admit    Data Reviewed/Counseling: I have personally reviewed the patient's vital signs, nursing notes, and other relevant tests, information, and imaging. I had a detailed discussion regarding the historical points, exam findings, and any diagnostic results supporting the discharge diagnosis.     Portions of this note were dictated using voice recognition software. Although it was reviewed for accuracy, some inherent voice recognition errors may have occurred and be present in this document.            Scribe Attestation:   Scribe #1: I performed the above scribed service and the documentation accurately describes the services I performed. I attest to the accuracy of the note.    Attending Attestation:           Physician Attestation for Scribe:  Physician Attestation Statement for Scribe #1: I, Serafin Rogers MD, reviewed documentation, as scribed by Philippe Jacobs in my presence, and it is both accurate and complete.             ED Course as of 08/08/22 0634   Mon Aug 08, 2022   0513 Radiology called to discuss read: acute SAH involving 4th ventricle.  - Neurosurgery consulted. [MC]   0522 Neurosurgery consulted and case discussed (Dewayne), agrees with CTA and admission to ICU for neuro checks. Nimotop ordered. [MC]   0530 EKG 12-lead  EKG independently interpreted by me.  EKG: NSR @ 72, 1st degree AV block (), QTc 407 [MC]   0555 X-Ray Chest 1 View  Independently  visualized/reviewed by me during the ED visit.  - No PTX, no consolidation [MC]      ED Course User Index  [MC] Serafin Rogers MD             Clinical Impression:   Final diagnoses:  [I60.9] SAH (subarachnoid hemorrhage)  [R51.9] Acute nonintractable headache, unspecified headache type (Primary)          ED Disposition Condition    Admit               Serafin Rogers MD  08/08/22 0687

## 2022-08-08 NOTE — PLAN OF CARE
08/08/22 0915   Discharge Assessment   Assessment Type Discharge Planning Assessment   Confirmed/corrected address, phone number and insurance Yes   Confirmed Demographics Correct on Facesheet   Source of Information patient   When was your last doctors appointment?   (Patient reports one year ago.)   Communicated CHERELLE with patient/caregiver Yes   Reason For Admission SAH (Subarachnoid Hemorrhage)   Lives With spouse;child(kayla), dependent   Do you expect to return to your current living situation? No  (Patient is expected to be transferred for a higher level of care to another facility.)   Do you have help at home or someone to help you manage your care at home? Yes   Prior to hospitilization cognitive status: Unable to Assess   Current cognitive status: Alert/Oriented   Walking or Climbing Stairs Difficulty none   Dressing/Bathing Difficulty none   Home Accessibility wheelchair accessible   Home Layout Able to live on 1st floor   Equipment Currently Used at Home none   Readmission within 30 days? No   Patient currently being followed by outpatient case management? No   Do you currently have service(s) that help you manage your care at home? No   Do you take prescription medications? No   Do you have prescription coverage? No   Do you have any problems affording any of your prescribed medications? No   Who is going to help you get home at discharge? Patient's spouse, Radha [facility]: [phone]   How do you get to doctors appointments? car, drives self   Are you on dialysis? No   Do you take coumadin? No   Discharge Plan A Other  (Transfer to an outside hospital for a higher level of care.)   Discharge Plan B Home with family   DME Needed Upon Discharge  none   Discharge Plan discussed with: Spouse/sig other;Patient   Name(s) and Number(s) Spouse: Radha [facility]: [phone]   Discharge Barriers Identified None   Relationship/Environment   Name(s) of Who Lives With Patient Patient resides with his wife and two minor  children, ages 14 and 17.

## 2022-08-09 LAB
ANION GAP SERPL CALC-SCNC: 8 MEQ/L
BASOPHILS # BLD AUTO: 0.01 X10(3)/MCL (ref 0–0.2)
BASOPHILS NFR BLD AUTO: 0.1 %
BUN SERPL-MCNC: 8.1 MG/DL (ref 8.9–20.6)
CALCIUM SERPL-MCNC: 9 MG/DL (ref 8.4–10.2)
CHLORIDE SERPL-SCNC: 105 MMOL/L (ref 98–107)
CO2 SERPL-SCNC: 24 MMOL/L (ref 22–29)
CREAT SERPL-MCNC: 0.85 MG/DL (ref 0.73–1.18)
CREAT/UREA NIT SERPL: 10
EOSINOPHIL # BLD AUTO: 0 X10(3)/MCL (ref 0–0.9)
EOSINOPHIL NFR BLD AUTO: 0 %
ERYTHROCYTE [DISTWIDTH] IN BLOOD BY AUTOMATED COUNT: 14.1 % (ref 11.5–17)
GFR SERPLBLD CREATININE-BSD FMLA CKD-EPI: >60 MLS/MIN/1.73/M2
GLUCOSE SERPL-MCNC: 115 MG/DL (ref 74–100)
HCT VFR BLD AUTO: 43.1 % (ref 42–52)
HGB BLD-MCNC: 13.6 GM/DL (ref 14–18)
IMM GRANULOCYTES # BLD AUTO: 0.04 X10(3)/MCL (ref 0–0.04)
IMM GRANULOCYTES NFR BLD AUTO: 0.4 %
LYMPHOCYTES # BLD AUTO: 0.9 X10(3)/MCL (ref 0.6–4.6)
LYMPHOCYTES NFR BLD AUTO: 8.9 %
MCH RBC QN AUTO: 25.9 PG (ref 27–31)
MCHC RBC AUTO-ENTMCNC: 31.6 MG/DL (ref 33–36)
MCV RBC AUTO: 81.9 FL (ref 80–94)
MONOCYTES # BLD AUTO: 0.62 X10(3)/MCL (ref 0.1–1.3)
MONOCYTES NFR BLD AUTO: 6.1 %
NEUTROPHILS # BLD AUTO: 8.6 X10(3)/MCL (ref 2.1–9.2)
NEUTROPHILS NFR BLD AUTO: 84.5 %
NRBC BLD AUTO-RTO: 0 %
PLATELET # BLD AUTO: 240 X10(3)/MCL (ref 130–400)
PMV BLD AUTO: 10.7 FL (ref 7.4–10.4)
POTASSIUM SERPL-SCNC: 3.5 MMOL/L (ref 3.5–5.1)
RBC # BLD AUTO: 5.26 X10(6)/MCL (ref 4.7–6.1)
SODIUM SERPL-SCNC: 137 MMOL/L (ref 136–145)
WBC # SPEC AUTO: 10.1 X10(3)/MCL (ref 4.5–11.5)

## 2022-08-09 PROCEDURE — 20000000 HC ICU ROOM

## 2022-08-09 PROCEDURE — 63600175 PHARM REV CODE 636 W HCPCS: Performed by: STUDENT IN AN ORGANIZED HEALTH CARE EDUCATION/TRAINING PROGRAM

## 2022-08-09 PROCEDURE — 25000003 PHARM REV CODE 250: Performed by: NEUROLOGICAL SURGERY

## 2022-08-09 PROCEDURE — 85025 COMPLETE CBC W/AUTO DIFF WBC: CPT | Performed by: STUDENT IN AN ORGANIZED HEALTH CARE EDUCATION/TRAINING PROGRAM

## 2022-08-09 PROCEDURE — 36415 COLL VENOUS BLD VENIPUNCTURE: CPT | Performed by: STUDENT IN AN ORGANIZED HEALTH CARE EDUCATION/TRAINING PROGRAM

## 2022-08-09 PROCEDURE — 25000003 PHARM REV CODE 250: Performed by: STUDENT IN AN ORGANIZED HEALTH CARE EDUCATION/TRAINING PROGRAM

## 2022-08-09 PROCEDURE — 80048 BASIC METABOLIC PNL TOTAL CA: CPT | Performed by: STUDENT IN AN ORGANIZED HEALTH CARE EDUCATION/TRAINING PROGRAM

## 2022-08-09 RX ADMIN — MORPHINE SULFATE 2 MG: 4 INJECTION INTRAVENOUS at 06:08

## 2022-08-09 RX ADMIN — SODIUM CHLORIDE: 9 INJECTION, SOLUTION INTRAVENOUS at 07:08

## 2022-08-09 RX ADMIN — MORPHINE SULFATE 2 MG: 4 INJECTION INTRAVENOUS at 02:08

## 2022-08-09 RX ADMIN — ONDANSETRON 8 MG: 2 INJECTION INTRAMUSCULAR; INTRAVENOUS at 02:08

## 2022-08-09 RX ADMIN — LEVETIRACETAM 500 MG: 100 INJECTION, SOLUTION, CONCENTRATE INTRAVENOUS at 09:08

## 2022-08-09 RX ADMIN — NIMODIPINE 60 MG: 30 CAPSULE, LIQUID FILLED ORAL at 06:08

## 2022-08-09 RX ADMIN — HYDRALAZINE HYDROCHLORIDE 10 MG: 20 INJECTION INTRAMUSCULAR; INTRAVENOUS at 11:08

## 2022-08-09 RX ADMIN — SODIUM CHLORIDE: 9 INJECTION, SOLUTION INTRAVENOUS at 02:08

## 2022-08-09 RX ADMIN — MORPHINE SULFATE 2 MG: 4 INJECTION INTRAVENOUS at 10:08

## 2022-08-09 RX ADMIN — NIMODIPINE 60 MG: 30 CAPSULE, LIQUID FILLED ORAL at 10:08

## 2022-08-09 RX ADMIN — LEVETIRACETAM 500 MG: 100 INJECTION, SOLUTION, CONCENTRATE INTRAVENOUS at 08:08

## 2022-08-09 RX ADMIN — MORPHINE SULFATE 2 MG: 4 INJECTION INTRAVENOUS at 01:08

## 2022-08-09 RX ADMIN — NIMODIPINE 60 MG: 30 CAPSULE, LIQUID FILLED ORAL at 02:08

## 2022-08-09 RX ADMIN — HYDRALAZINE HYDROCHLORIDE 10 MG: 20 INJECTION INTRAMUSCULAR; INTRAVENOUS at 05:08

## 2022-08-09 RX ADMIN — NIMODIPINE 60 MG: 30 CAPSULE, LIQUID FILLED ORAL at 09:08

## 2022-08-09 RX ADMIN — NIMODIPINE 60 MG: 30 CAPSULE, LIQUID FILLED ORAL at 01:08

## 2022-08-09 NOTE — PROGRESS NOTES
Pulmonary & Critical Care Medicine       Progress Note      Presenting History/HPI:  46 AAF with no reported PMH presented for sudden-on headache. Patient was watching TV, laying in bed around 12AM when he started experiencing intense pressure/pain in the occipital region radiating to the neck a/w dizziness/N/V for which he took 2 enma's aspirin and heating pack with some improvement, but ultimately decided to come to the ED accompanied by wife due to persistence of HA. No prior episodes. No bowel or bladder dysfunction. In the ED imaging CTH revealed subtle SAH with obstructing hydrocephalus above 4th ventricle. NSGY, Dr. Buchanan consulted by ED physician, recommended CTA head and neck for now and ICU admission for q1h neurochecks.       Interval History:  -no major issues or changes overnight  -patient complaining of some headache this morning  -denies nausea vomiting visual disturbance or generalized weakness  -no issues with blood pressure control etc  -still waiting for transfer to [facility] for interventional Neurology      Scheduled Medications:    levetiracetam IV  500 mg Intravenous Q12H    niMODipine  60 mg Oral Q4H       PRN Medications:   hydrALAZINE, labetalol, morphine, ondansetron, sodium chloride 0.9%      Infusions:     sodium chloride 0.9% 125 mL/hr at 08/08/22 1730         Fluid Balance:     Intake/Output Summary (Last 24 hours) at 8/9/2022 0908  Last data filed at 8/9/2022 0800  Gross per 24 hour   Intake 1794 ml   Output 2100 ml   Net -306 ml           Vital Signs:   Vitals:    08/09/22 0810   BP: 129/79   Pulse: 71   Resp: 12   Temp:          Physical Exam  Vitals and nursing note reviewed.   Constitutional:       General: He is not in acute distress.     Appearance: Normal appearance. He is not ill-appearing or toxic-appearing.   HENT:      Head: Normocephalic and atraumatic.      Right Ear: External ear normal.      Left Ear: External ear normal.      Nose: Nose normal.       Mouth/Throat:      Mouth: Mucous membranes are moist.      Pharynx: Oropharynx is clear. No oropharyngeal exudate or posterior oropharyngeal erythema.   Eyes:      General: No scleral icterus.     Extraocular Movements: Extraocular movements intact.      Conjunctiva/sclera: Conjunctivae normal.      Pupils: Pupils are equal, round, and reactive to light.   Neck:      Vascular: No carotid bruit.   Cardiovascular:      Rate and Rhythm: Normal rate and regular rhythm.      Pulses: Normal pulses.      Heart sounds: Normal heart sounds. No murmur heard.    No friction rub. No gallop.   Pulmonary:      Effort: Pulmonary effort is normal. No respiratory distress.      Breath sounds: Normal breath sounds. No wheezing, rhonchi or rales.   Abdominal:      General: Abdomen is flat. Bowel sounds are normal. There is no distension.      Palpations: Abdomen is soft.      Tenderness: There is no abdominal tenderness. There is no guarding or rebound.   Genitourinary:     Comments: deferred  Musculoskeletal:         General: No swelling or deformity. Normal range of motion.      Cervical back: Normal range of motion and neck supple. No rigidity or tenderness.   Lymphadenopathy:      Cervical: No cervical adenopathy.   Skin:     General: Skin is warm and dry.      Capillary Refill: Capillary refill takes less than 2 seconds.      Coloration: Skin is not jaundiced.      Findings: No bruising, lesion or rash.   Neurological:      General: No focal deficit present.      Mental Status: He is alert.      Sensory: No sensory deficit.      Motor: No weakness.   Psychiatric:         Mood and Affect: Mood normal.         Laboratory Studies:   No results for input(s): PH, PCO2, PO2, HCO3, POCSATURATED, BE in the last 24 hours.  Recent Labs   Lab 08/09/22  0133   WBC 10.1   RBC 5.26   HGB 13.6*   HCT 43.1      MCV 81.9   MCH 25.9*   MCHC 31.6*     Recent Labs   Lab 08/09/22  0133   GLUCOSE 115*      K 3.5   CO2 24   BUN 8.1*    CREATININE 0.85         Microbiology Data:   Microbiology Results (last 7 days)     ** No results found for the last 168 hours. **            Imaging:   X-Ray Chest 1 View  Narrative: EXAMINATION:  XR CHEST 1 VIEW    TECHNIQUE:  AP chest    COMPARISON:  None.    FINDINGS:  The heart is normal in size.  There is no focal airspace consolidation.  There is no pleural effusion or visible pneumothorax.  Impression: No acute abnormality of the chest.    Electronically signed by: Nithya Mason  Date:    08/08/2022  Time:    11:17  CTA Head and Neck (xpd)  Narrative: EXAMINATION:  CTA HEAD AND NECK (XPD)    CLINICAL HISTORY:  SAH;    TECHNIQUE:  Axial images obtained through the cervical region and [place] of [place] before and after the administration of intravenous contrast.    Coronal, sagittal, MIP and 3D reconstructions were obtained from the axial data.    Automatic exposure control was utilized to limit radiation dose.    Radiation Dose:    Total DLP: 1692 mGy*cm    COMPARISON:  CT of the head from the same day    FINDINGS:  Head CT with contrast:    No interval changes when compared to the previous CT.    No enhancing abnormalities.    If present, stenosis of the carotid bulbs is measured based on NASCET criteria,    i.e. area of maximal stenosis compared to the cervical ICA distal to the bulb.    Cervical CTA:    The origins of the great vessels are patent with a common origin of the brachiocephalic trunk and left common carotid artery.    The common carotid arteries, carotid bulbs and internal carotid arteries are normal in caliber.    The vertebral arteries are patent.    Intracranial CTA:    The internal carotid arteries, middle cerebral arteries and anterior cerebral arteries are patent and normal in caliber.    The vertebral arteries are patent.  There is mild multifocal narrowing and irregularity of the basilar artery.  There is also irregularity of the left proximal fetal type posterior cerebral  artery.  Impression: Mild multifocal narrowing and irregularity of the basilar artery and the proximal left fetal type posterior cerebral artery, which may be due to vasospasm.  Small underlying aneurysm is not excluded and conventional angiogram is suggested.    No major change from the [facility] interpretation.    Electronically signed by: Nithya Mason  Date:    08/08/2022  Time:    09:53  CT Head Without Contrast  Narrative: EXAMINATION:  CT HEAD WITHOUT CONTRAST    CLINICAL HISTORY:  Subarachnoid hemorrhage (SAH) suspected;HA x 3.5 hours, N/V x3;    TECHNIQUE:  Axial scans were obtained from skull base to the vertex.    Coronal and sagittal reconstructions obtained from the axial data.    Automatic exposure control was utilized to limit radiation dose.    Contrast: None    Radiation Dose:    Total DLP: 1013 mGy*cm    COMPARISON:  None    FINDINGS:  There is subarachnoid hemorrhage centered in the prepontine cistern, with small amount extending into the bilateral sylvian fissures.  Small amount of hemorrhage is seen in the 4th ventricle.  The gray-white matter differentiation is preserved    There is sulcal effacement and mild dilatation of the lateral ventricles concerning for developing hydrocephalus.  There is no midline shift or downward herniation.    The calvarium and skull base are intact.  There is trace scattered paranasal sinus mucosal thickening.  The mastoid air cells are clear.  Impression: Subarachnoid hemorrhage in the prepontine cistern and sylvian fissures, small volume intraventricular hemorrhage and developing hydrocephalus.    No significant change from the [facility] interpretation.    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

## 2022-08-09 NOTE — PLAN OF CARE
Spoke to Altagracia with [facility] transfer center. She states anticipated bed openings did not happen. Referring and receiving physician have been in contact regarding this matter.

## 2022-08-09 NOTE — PROGRESS NOTES
[facility] [address]  Neurosurgery  Progress note    Consults  Subjective:     Chief Complaint/Reason for Admission: HA    History of Present Illness: Patient is a 45yo male with no significant PMHX, who presented to ED this am with c/o pressure to the back of his head that began suddenly around 0030 this am, waking him from his sleep. He reports the pain from his head radiated down to his shoulders. He did take some medication and laid back down. Pt reports he felt better after this, but then the pain to his head soon returned. He reports 3 episodes of vomiting. He denies any previous neck/back sx, vision changes, or any other medical hx. Pt also denies ETOH, smoking, or drugs.     On arrival to the ED a CT head revealed SAH in the prepontine cistern and sylvian fissures with a small volume IVH. Dr. Buchanan was consulted for evaluation and treatment recommendations.    08/09/2022:  Patient with no headache and nausea at this time.  Resting in bed quietly.  Vital signs stable.  No new issues.  Triple H therapy and progress.  Nursing reports that if transfer does not occur before mid day we will cancel it and Dr. Ivory will be consulted.        Review of patient's allergies indicates:   Allergen Reactions    Shellfish containing products        No past medical history on file.  Past Surgical History:   Procedure Laterality Date    APPENDECTOMY      FRACTURE SURGERY Right     pinky finger     Family History    None       Tobacco Use    Smoking status: Never Smoker    Smokeless tobacco: Not on file   Substance and Sexual Activity    Alcohol use: Never    Drug use: Never    Sexual activity: Yes     Review of Systems   All other systems reviewed and are negative.    Objective:     12 pt ROS WNL, except for HPI    Weight: 68.9 kg (151 lb 14.4 oz)  Body mass index is 25.28 kg/m².  Vital Signs (Most Recent):  Temp: 98.6 °F (37 °C) (08/09/22 0730)  Pulse: 63 (08/09/22 1100)  Resp: 15 (08/09/22  1100)  BP: (!) 143/91 (08/09/22 1100)  SpO2: 99 % (08/09/22 1100) Vital Signs (24h Range):  Temp:  [98.4 °F (36.9 °C)-98.9 °F (37.2 °C)] 98.6 °F (37 °C)  Pulse:  [63-85] 63  Resp:  [5-29] 15  SpO2:  [97 %-100 %] 99 %  BP: (111-155)/(58-92) 143/91     Date 08/09/22 0700 - 08/10/22 0659   Shift 2746-8672 8411-4397 6938-9071 24 Hour Total   INTAKE   Shift Total(mL/kg)       OUTPUT   Urine(mL/kg/hr) 400   400   Shift Total(mL/kg) 400(5.8)   400(5.8)   Weight (kg) 68.9 68.9 68.9 68.9       Physical Exam:    Constitutional: He appears well-developed and well-nourished. No distress.     Eyes: Pupils are equal, round, and reactive to light. EOM are normal.   Fundoscopic exam:       The right eye shows no hemorrhage.        The left eye shows no hemorrhage.     Cardiovascular: Normal rate, regular rhythm and intact distal pulses.     Abdominal: Soft. Bowel sounds are normal.     Psych/Behavior: He is alert. He is oriented to person, place, and time. He has a normal mood and affect.     Musculoskeletal:        Neck: Range of motion is full.        Back: Range of motion is full.        Right Upper Extremities: Range of motion is full. Muscle strength is 5/5.        Left Upper Extremities: Range of motion is full. Muscle strength is 5/5.       Right Lower Extremities: Range of motion is full. Muscle strength is 5/5.        Left Lower Extremities: Range of motion is full. Muscle strength is 5/5.     Neurological:        DTRs: DTRs are DTRS NORMAL AND SYMMETRICnormal and symmetric.        Cranial nerves: Cranial nerve(s) II, III, IV, V, VI, VII, VIII, IX, X, XI and XII are intact.   Neg pronator drift      Significant Labs:  Recent Labs   Lab 08/08/22  0437 08/09/22  0133    137   K 3.3* 3.5   CO2 28 24   BUN 14.9 8.1*   CREATININE 1.02 0.85   CALCIUM 9.1 9.0     Recent Labs   Lab 08/08/22  0437 08/09/22  0133   WBC 9.7 10.1   HGB 14.0 13.6*   HCT 44.6 43.1    240     Recent Labs   Lab 08/08/22  0516   INR 1.05      Microbiology Results (last 7 days)     ** No results found for the last 168 hours. **          Significant Diagnostics:  CT Head Without Contrast [[id number]] Resulted: 08/08/22 0930   Order Status: Completed Updated: 08/08/22 0933   Narrative:     EXAMINATION:   CT HEAD WITHOUT CONTRAST     CLINICAL HISTORY:   Subarachnoid hemorrhage (SAH) suspected;HA x 3.5 hours, N/V x3;     TECHNIQUE:   Axial scans were obtained from skull base to the vertex.     Coronal and sagittal reconstructions obtained from the axial data.     Automatic exposure control was utilized to limit radiation dose.     Contrast: None     Radiation Dose:     Total DLP: 1013 mGy*cm     COMPARISON:   None     FINDINGS:   There is subarachnoid hemorrhage centered in the prepontine cistern, with small amount extending into the bilateral sylvian fissures.  Small amount of hemorrhage is seen in the 4th ventricle.  The gray-white matter differentiation is preserved     There is sulcal effacement and mild dilatation of the lateral ventricles concerning for developing hydrocephalus.  There is no midline shift or downward herniation.     The calvarium and skull base are intact.  There is trace scattered paranasal sinus mucosal thickening.  The mastoid air cells are clear.    Impression:       Subarachnoid hemorrhage in the prepontine cistern and sylvian fissures, small volume intraventricular hemorrhage and developing hydrocephalus.     No significant change from the [facility] interpretation.      CTA Head and Neck (xpd) [[id number]] Resulted: 08/08/22 0546   Order Status: Completed Updated: 08/08/22 0633   Narrative:     START OF REPORT:   Technique: CT angiogram of the intracranial vessels was performed with intravenous contrast.     Comparison: None.     Clinical history: Headache.     Findings:   Intracranial Vascular structures: Unremarkable.   Internal carotid arteries: Unremarkable.   Middle cerebral arteries: Unremarkable.   Anterior cerebral  arteries: Unremarkable.   Vertebral arteries: Unremarkable.   Basilar artery: Unremarkable.   Posterior cerebral arteries: Fetal origin of the bilateral posterior cerebral artery is seen with hypoplastic P1 segment on the left. There is some irregularity of the fetal originating left posterior cerebral artery off the left internal carotid with 2 small irregular saccular foci best seen on on image 153 of series 7 proximal to the convergence with the left P1 segment of the left posterior cerebral artery.   Jugular Veins and venous sinuses: Unremarkable.   Neck Vascular structures: The visualized aorta and origin of the great vessels of the neck appear unremarkable.   Carotids: Unremarkable.   Common carotid arteries: Unremarkable.   Internal carotid artery: Unremarkable.   Vertebral arteries: Unremarkable.   Jugular Veins and venous sinuses: Unremarkable.   Hemorrhage: Again seen is the stable appearing subarachnoid hemorrhage in the prepontine cistern and in the fourth ventricle. There continues to be prominence of the lateral third and fourth ventricles which may reflect early obstructive hydrocephalus.   Brain parenchyma: No abnormal intracranial enhancement is identified.     Notifications: The results were discussed with the emergency room physician prior to dictation at 2022-08-08 06:16:04 CDT.       Impression:   1. There is some irregularity of the fetal originating left posterior cerebral artery off the left internal carotid with 2 small irregular saccular foci best seen on on image 153 of series 7 proximal to the convergence with the left P1 segment of the left posterior cerebral artery. One or both of these may reflect tiny aneurysms. Correlate clinically as regards additional evaluation and follow-up.   2. Again seen is the stable appearing subarachnoid hemorrhage in the prepontine cistern and in the fourth ventricle. There continues to be prominence of the lateral third and fourth ventricles which may  reflect early obstructive hydrocephalus. Correlate clinically and recommend continued serial interval follow-up as indicated.   3. Details and other findings as described above.          Assessment/Plan:       Triple H therapy and progress  Patient does have IV fluids infusing.  Do not discontinue.  Nimodipine  Keppra seizure precautions  Blood pressure less than 120 systolic.  Continue neurological exams hourly.  SCD        AMINATA Fountain-BC  Neurosurgery  [facility] - [facility] ICU

## 2022-08-10 ENCOUNTER — ANESTHESIA (OUTPATIENT)
Dept: INTERVENTIONAL RADIOLOGY/VASCULAR | Facility: HOSPITAL | Age: 47
DRG: 065 | End: 2022-08-10
Payer: COMMERCIAL

## 2022-08-10 PROBLEM — I60.9 SAH (SUBARACHNOID HEMORRHAGE): Status: ACTIVE | Noted: 2022-08-10

## 2022-08-10 LAB
ANION GAP SERPL CALC-SCNC: 8 MEQ/L
BASOPHILS # BLD AUTO: 0.02 X10(3)/MCL (ref 0–0.2)
BASOPHILS NFR BLD AUTO: 0.2 %
BUN SERPL-MCNC: 7.7 MG/DL (ref 8.9–20.6)
CALCIUM SERPL-MCNC: 9.4 MG/DL (ref 8.4–10.2)
CHLORIDE SERPL-SCNC: 103 MMOL/L (ref 98–107)
CO2 SERPL-SCNC: 24 MMOL/L (ref 22–29)
CREAT SERPL-MCNC: 0.9 MG/DL (ref 0.73–1.18)
CREAT/UREA NIT SERPL: 9
EOSINOPHIL # BLD AUTO: 0 X10(3)/MCL (ref 0–0.9)
EOSINOPHIL NFR BLD AUTO: 0 %
ERYTHROCYTE [DISTWIDTH] IN BLOOD BY AUTOMATED COUNT: 14.1 % (ref 11.5–17)
GFR SERPLBLD CREATININE-BSD FMLA CKD-EPI: >60 MLS/MIN/1.73/M2
GLUCOSE SERPL-MCNC: 116 MG/DL (ref 74–100)
HCT VFR BLD AUTO: 48.5 % (ref 42–52)
HGB BLD-MCNC: 15 GM/DL (ref 14–18)
IMM GRANULOCYTES # BLD AUTO: 0.04 X10(3)/MCL (ref 0–0.04)
IMM GRANULOCYTES NFR BLD AUTO: 0.4 %
LYMPHOCYTES # BLD AUTO: 0.79 X10(3)/MCL (ref 0.6–4.6)
LYMPHOCYTES NFR BLD AUTO: 7.7 %
MCH RBC QN AUTO: 26 PG (ref 27–31)
MCHC RBC AUTO-ENTMCNC: 30.9 MG/DL (ref 33–36)
MCV RBC AUTO: 83.9 FL (ref 80–94)
MONOCYTES # BLD AUTO: 0.57 X10(3)/MCL (ref 0.1–1.3)
MONOCYTES NFR BLD AUTO: 5.6 %
NEUTROPHILS # BLD AUTO: 8.8 X10(3)/MCL (ref 2.1–9.2)
NEUTROPHILS NFR BLD AUTO: 86.1 %
NRBC BLD AUTO-RTO: 0 %
PLATELET # BLD AUTO: 242 X10(3)/MCL (ref 130–400)
PMV BLD AUTO: 10.5 FL (ref 7.4–10.4)
POTASSIUM SERPL-SCNC: 4.1 MMOL/L (ref 3.5–5.1)
RBC # BLD AUTO: 5.78 X10(6)/MCL (ref 4.7–6.1)
SODIUM SERPL-SCNC: 135 MMOL/L (ref 136–145)
WBC # SPEC AUTO: 10.2 X10(3)/MCL (ref 4.5–11.5)

## 2022-08-10 PROCEDURE — 25000003 PHARM REV CODE 250: Performed by: NEUROLOGICAL SURGERY

## 2022-08-10 PROCEDURE — 63600175 PHARM REV CODE 636 W HCPCS: Mod: JG

## 2022-08-10 PROCEDURE — 25000003 PHARM REV CODE 250: Performed by: STUDENT IN AN ORGANIZED HEALTH CARE EDUCATION/TRAINING PROGRAM

## 2022-08-10 PROCEDURE — 85025 COMPLETE CBC W/AUTO DIFF WBC: CPT | Performed by: STUDENT IN AN ORGANIZED HEALTH CARE EDUCATION/TRAINING PROGRAM

## 2022-08-10 PROCEDURE — C9248 INJ, CLEVIDIPINE BUTYRATE: HCPCS | Mod: JG

## 2022-08-10 PROCEDURE — 36415 COLL VENOUS BLD VENIPUNCTURE: CPT | Performed by: STUDENT IN AN ORGANIZED HEALTH CARE EDUCATION/TRAINING PROGRAM

## 2022-08-10 PROCEDURE — 20000000 HC ICU ROOM

## 2022-08-10 PROCEDURE — 25000003 PHARM REV CODE 250: Performed by: NURSE ANESTHETIST, CERTIFIED REGISTERED

## 2022-08-10 PROCEDURE — 63600175 PHARM REV CODE 636 W HCPCS: Mod: JG | Performed by: STUDENT IN AN ORGANIZED HEALTH CARE EDUCATION/TRAINING PROGRAM

## 2022-08-10 PROCEDURE — 80048 BASIC METABOLIC PNL TOTAL CA: CPT | Performed by: STUDENT IN AN ORGANIZED HEALTH CARE EDUCATION/TRAINING PROGRAM

## 2022-08-10 PROCEDURE — 25500020 PHARM REV CODE 255: Performed by: INTERNAL MEDICINE

## 2022-08-10 PROCEDURE — 63600175 PHARM REV CODE 636 W HCPCS: Performed by: STUDENT IN AN ORGANIZED HEALTH CARE EDUCATION/TRAINING PROGRAM

## 2022-08-10 PROCEDURE — C9248 INJ, CLEVIDIPINE BUTYRATE: HCPCS | Mod: JG | Performed by: STUDENT IN AN ORGANIZED HEALTH CARE EDUCATION/TRAINING PROGRAM

## 2022-08-10 PROCEDURE — 63600175 PHARM REV CODE 636 W HCPCS: Performed by: NURSE ANESTHETIST, CERTIFIED REGISTERED

## 2022-08-10 RX ORDER — FENTANYL CITRATE 50 UG/ML
INJECTION, SOLUTION INTRAMUSCULAR; INTRAVENOUS
Status: DISCONTINUED | OUTPATIENT
Start: 2022-08-10 | End: 2022-08-10

## 2022-08-10 RX ORDER — MIDAZOLAM HYDROCHLORIDE 1 MG/ML
2 INJECTION INTRAMUSCULAR; INTRAVENOUS ONCE AS NEEDED
Status: CANCELLED | OUTPATIENT
Start: 2022-08-10 | End: 2034-01-06

## 2022-08-10 RX ORDER — ONDANSETRON 4 MG/1
8 TABLET, ORALLY DISINTEGRATING ORAL EVERY 6 HOURS PRN
Status: CANCELLED | OUTPATIENT
Start: 2022-08-10

## 2022-08-10 RX ORDER — PROPOFOL 10 MG/ML
VIAL (ML) INTRAVENOUS
Status: DISCONTINUED | OUTPATIENT
Start: 2022-08-10 | End: 2022-08-10

## 2022-08-10 RX ORDER — ONDANSETRON 2 MG/ML
4 INJECTION INTRAMUSCULAR; INTRAVENOUS DAILY PRN
Status: CANCELLED | OUTPATIENT
Start: 2022-08-10

## 2022-08-10 RX ORDER — HYDROMORPHONE HYDROCHLORIDE 2 MG/ML
0.2 INJECTION, SOLUTION INTRAMUSCULAR; INTRAVENOUS; SUBCUTANEOUS EVERY 5 MIN PRN
Status: CANCELLED | OUTPATIENT
Start: 2022-08-10

## 2022-08-10 RX ORDER — DEXAMETHASONE SODIUM PHOSPHATE 4 MG/ML
INJECTION, SOLUTION INTRA-ARTICULAR; INTRALESIONAL; INTRAMUSCULAR; INTRAVENOUS; SOFT TISSUE
Status: DISCONTINUED | OUTPATIENT
Start: 2022-08-10 | End: 2022-08-10

## 2022-08-10 RX ORDER — HYDROMORPHONE HYDROCHLORIDE 2 MG/ML
0.4 INJECTION, SOLUTION INTRAMUSCULAR; INTRAVENOUS; SUBCUTANEOUS EVERY 5 MIN PRN
Status: CANCELLED | OUTPATIENT
Start: 2022-08-10

## 2022-08-10 RX ORDER — METHYLPREDNISOLONE ACETATE 80 MG/ML
INJECTION, SUSPENSION INTRA-ARTICULAR; INTRALESIONAL; INTRAMUSCULAR; SOFT TISSUE
Status: DISCONTINUED | OUTPATIENT
Start: 2022-08-10 | End: 2022-08-10

## 2022-08-10 RX ORDER — MEPERIDINE HYDROCHLORIDE 25 MG/ML
12.5 INJECTION INTRAMUSCULAR; INTRAVENOUS; SUBCUTANEOUS EVERY 10 MIN PRN
Status: CANCELLED | OUTPATIENT
Start: 2022-08-10 | End: 2022-08-11

## 2022-08-10 RX ORDER — PROCHLORPERAZINE EDISYLATE 5 MG/ML
5 INJECTION INTRAMUSCULAR; INTRAVENOUS EVERY 30 MIN PRN
Status: CANCELLED | OUTPATIENT
Start: 2022-08-10

## 2022-08-10 RX ORDER — SODIUM CHLORIDE, SODIUM GLUCONATE, SODIUM ACETATE, POTASSIUM CHLORIDE AND MAGNESIUM CHLORIDE 30; 37; 368; 526; 502 MG/100ML; MG/100ML; MG/100ML; MG/100ML; MG/100ML
1000 INJECTION, SOLUTION INTRAVENOUS CONTINUOUS
Status: CANCELLED | OUTPATIENT
Start: 2022-08-10 | End: 2022-09-09

## 2022-08-10 RX ORDER — DIPHENHYDRAMINE HYDROCHLORIDE 50 MG/ML
INJECTION INTRAMUSCULAR; INTRAVENOUS
Status: DISCONTINUED | OUTPATIENT
Start: 2022-08-10 | End: 2022-08-10

## 2022-08-10 RX ORDER — ROCURONIUM BROMIDE 10 MG/ML
INJECTION, SOLUTION INTRAVENOUS
Status: DISCONTINUED | OUTPATIENT
Start: 2022-08-10 | End: 2022-08-10

## 2022-08-10 RX ORDER — LIDOCAINE HYDROCHLORIDE 10 MG/ML
1 INJECTION, SOLUTION EPIDURAL; INFILTRATION; INTRACAUDAL; PERINEURAL ONCE
Status: CANCELLED | OUTPATIENT
Start: 2022-08-10 | End: 2022-08-10

## 2022-08-10 RX ORDER — IPRATROPIUM BROMIDE AND ALBUTEROL SULFATE 2.5; .5 MG/3ML; MG/3ML
3 SOLUTION RESPIRATORY (INHALATION)
Status: DISCONTINUED | OUTPATIENT
Start: 2022-08-10 | End: 2022-08-22 | Stop reason: HOSPADM

## 2022-08-10 RX ADMIN — IOPAMIDOL 100 ML: 755 INJECTION, SOLUTION INTRAVENOUS at 05:08

## 2022-08-10 RX ADMIN — MORPHINE SULFATE 2 MG: 4 INJECTION INTRAVENOUS at 10:08

## 2022-08-10 RX ADMIN — CLEVIPIDINE 1 MG/HR: 0.5 EMULSION INTRAVENOUS at 05:08

## 2022-08-10 RX ADMIN — LEVETIRACETAM 500 MG: 100 INJECTION, SOLUTION, CONCENTRATE INTRAVENOUS at 09:08

## 2022-08-10 RX ADMIN — ROCURONIUM BROMIDE 50 MG: 10 SOLUTION INTRAVENOUS at 02:08

## 2022-08-10 RX ADMIN — NIMODIPINE 60 MG: 30 CAPSULE, LIQUID FILLED ORAL at 01:08

## 2022-08-10 RX ADMIN — SODIUM CHLORIDE: 9 INJECTION, SOLUTION INTRAVENOUS at 03:08

## 2022-08-10 RX ADMIN — DEXAMETHASONE SODIUM PHOSPHATE 8 MG: 4 INJECTION, SOLUTION INTRA-ARTICULAR; INTRALESIONAL; INTRAMUSCULAR; INTRAVENOUS; SOFT TISSUE at 03:08

## 2022-08-10 RX ADMIN — FENTANYL CITRATE 100 MCG: 50 INJECTION, SOLUTION INTRAMUSCULAR; INTRAVENOUS at 02:08

## 2022-08-10 RX ADMIN — NIMODIPINE 60 MG: 30 CAPSULE, LIQUID FILLED ORAL at 09:08

## 2022-08-10 RX ADMIN — SUGAMMADEX 200 MG: 100 INJECTION, SOLUTION INTRAVENOUS at 03:08

## 2022-08-10 RX ADMIN — ONDANSETRON 4 MG: 2 INJECTION INTRAMUSCULAR; INTRAVENOUS at 03:08

## 2022-08-10 RX ADMIN — DIPHENHYDRAMINE HYDROCHLORIDE 50 MG: 50 INJECTION INTRAMUSCULAR; INTRAVENOUS at 02:08

## 2022-08-10 RX ADMIN — NIMODIPINE 60 MG: 30 CAPSULE, LIQUID FILLED ORAL at 05:08

## 2022-08-10 RX ADMIN — CLEVIPIDINE 3 MG/HR: 0.5 EMULSION INTRAVENOUS at 10:08

## 2022-08-10 RX ADMIN — PROPOFOL 200 MG: 10 INJECTION, EMULSION INTRAVENOUS at 02:08

## 2022-08-10 RX ADMIN — LABETALOL HYDROCHLORIDE 10 MG: 5 INJECTION, SOLUTION INTRAVENOUS at 05:08

## 2022-08-10 RX ADMIN — HYDRALAZINE HYDROCHLORIDE 10 MG: 20 INJECTION INTRAMUSCULAR; INTRAVENOUS at 03:08

## 2022-08-10 RX ADMIN — MORPHINE SULFATE 2 MG: 4 INJECTION INTRAVENOUS at 04:08

## 2022-08-10 RX ADMIN — METHYLPREDNISOLONE ACETATE 125 MG: 80 INJECTION, SUSPENSION INTRA-ARTICULAR; INTRALESIONAL; INTRAMUSCULAR; SOFT TISSUE at 02:08

## 2022-08-10 RX ADMIN — NIMODIPINE 60 MG: 30 CAPSULE, LIQUID FILLED ORAL at 06:08

## 2022-08-10 RX ADMIN — MORPHINE SULFATE 2 MG: 4 INJECTION INTRAVENOUS at 09:08

## 2022-08-10 RX ADMIN — HYDRALAZINE HYDROCHLORIDE 10 MG: 20 INJECTION INTRAMUSCULAR; INTRAVENOUS at 04:08

## 2022-08-10 RX ADMIN — SODIUM CHLORIDE: 9 INJECTION, SOLUTION INTRAVENOUS at 02:08

## 2022-08-10 RX ADMIN — SODIUM CHLORIDE: 9 INJECTION, SOLUTION INTRAVENOUS at 09:08

## 2022-08-10 NOTE — TRANSFER OF CARE
"Anesthesia Transfer of Care Note    Patient: Canelo Blank    Procedure(s) Performed: * No procedures listed *    Patient location: PACU    Anesthesia Type: general    Transport from OR: Transported from OR on room air with adequate spontaneous ventilation    Post pain: adequate analgesia    Post assessment: no apparent anesthetic complications    Post vital signs: stable    Level of consciousness: sedated and responds to stimulation    Nausea/Vomiting: no nausea/vomiting    Complications: none    Transfer of care protocol was followed      Last vitals:   Visit Vitals  BP (!) 140/75   Pulse 63   Temp 37.3 °C (99.1 °F) (Oral)   Resp 18   Ht 5' 5" (1.651 m)   Wt 68.9 kg (151 lb 14.4 oz)   SpO2 98%   BMI 25.28 kg/m²     "

## 2022-08-10 NOTE — SUBJECTIVE & OBJECTIVE
History reviewed. No pertinent past medical history.    Past Surgical History:   Procedure Laterality Date    APPENDECTOMY      FRACTURE SURGERY Right     pinky finger       Review of patient's allergies indicates:   Allergen Reactions    Shellfish containing products          No current facility-administered medications on file prior to encounter.     No current outpatient medications on file prior to encounter.     Family History    None       Tobacco Use    Smoking status: Never Smoker    Smokeless tobacco: Not on file   Substance and Sexual Activity    Alcohol use: Never    Drug use: Never    Sexual activity: Yes     Review of Systems   Neurological:  Positive for headaches.   All other systems reviewed and are negative.    Objective:     Vital Signs (Most Recent):  Temp: 99.1 °F (37.3 °C) (08/09/22 2000)  Pulse: 63 (08/10/22 1100)  Resp: 18 (08/10/22 1100)  BP: (!) 140/75 (08/10/22 1337)  SpO2: 98 % (08/10/22 1100)   Vital Signs (24h Range):  Temp:  [98.1 °F (36.7 °C)-99.1 °F (37.3 °C)] 99.1 °F (37.3 °C)  Pulse:  [59-80] 63  Resp:  [11-24] 18  SpO2:  [97 %-100 %] 98 %  BP: (122-160)/(53-95) 140/75     Weight: 68.9 kg (151 lb 14.4 oz)  Body mass index is 25.28 kg/m².    Physical Exam  Constitutional:       General: He is not in acute distress.     Appearance: Normal appearance. He is not ill-appearing.   Eyes:      Extraocular Movements: Extraocular movements intact.      Pupils: Pupils are equal, round, and reactive to light.   Cardiovascular:      Rate and Rhythm: Normal rate and regular rhythm.   Pulmonary:      Effort: Pulmonary effort is normal.   Musculoskeletal:         General: Normal range of motion.   Skin:     General: Skin is warm and dry.      Capillary Refill: Capillary refill takes less than 2 seconds.   Neurological:      General: No focal deficit present.      Mental Status: He is alert. Mental status is at baseline.      Comments:   Mental Status: Alert, oriented x 4, follows commands  reliably  Language/Speech: speech clear, fluent, coherent  Cranial Nerves:  -CN II, III: PERRLA, no visual field deficit, ptosis absent  -CN III, IV, VI: EOMI, no gaze preference, no nystagmus  -CN V: facial tactile sensation intact symmetrical  -CN VII: no facial asymmetry  -CN VIII: normal hearing to speech  -CN XI: head turning and shoulder shrug intact  -CN XII: tongue protrusion midline  Motor: BUE 5/5, BLE 5/5; no lateralizing or focal deficits noted  Sensory: normal to tactile stimulation  Cerebellar: no tremor or dysmetria  Memory: normal thought process intact  Gait: not observed   Psychiatric:         Mood and Affect: Mood normal.         Behavior: Behavior normal.       Significant Labs: BMP:   Recent Labs   Lab 08/09/22  0133 08/10/22  0203    135*   K 3.5 4.1   CO2 24 24   BUN 8.1* 7.7*   CREATININE 0.85 0.90   CALCIUM 9.0 9.4     CBC:   Recent Labs   Lab 08/09/22  0133 08/10/22  0203   WBC 10.1 10.2   HGB 13.6* 15.0   HCT 43.1 48.5    242       Significant Imaging: I have reviewed all pertinent imaging results/findings within the past 24 hours.

## 2022-08-10 NOTE — CONSULTS
[facility] - [facility]  Interventional Neurology  Consult Note    Patient Name: Canelo Blank  MRN: [id number]  Admission Date: 8/8/2022  Hospital Length of Stay: 2 days  Code Status: Full Code   Attending Provider: Wes Roger MD   Consulting Provider: NE Mujica  Primary Care Physician: Primary Doctor No  Principal Problem:<principal problem not specified>    IP CONSULT TO NEURO-INTERVENTIONAL  Consult performed by: NE Mujica  Consult ordered by: Darek Ivory MD    IP CONSULT TO NEURO-INTERVENTIONAL  Consult performed by: NE Mujica  Consult ordered by: Celso Buchanan MD         Subjective:     Chief Complaint:    Chief Complaint   Patient presents with    Headache     Complaint of new onset headache, with pressure. Vomited x 1 prior to arrival.  Symptoms started at 0030          HPI:   Canelo Blank is a 46-year-old male with no significant medical history, presented to ED 8/8 with reports of severe HA.  He also complained of neck pain radiating down to his shoulders.  He took BC powder at home, but headache worsened.  Wife reports that he vomited prior to ED arrival.  CTh showed SAH with obstructing hydrocephalus.  CTA h/n showed some irregularity of the fetal originating left posterior cerebral artery off the left internal carotid with 2 small irregular saccular foci.  Initial plan was to transfer patient to a facility in [place] for interventional neurology evaluation, but there were no beds available for them to accept the patient.     Discussed with Dr Ivory, who stated he discussed case with neurosurgery and is planning to take patient to cath lab for embolization later today.        History reviewed. No pertinent past medical history.    Past Surgical History:   Procedure Laterality Date    APPENDECTOMY      FRACTURE SURGERY Right     pinky finger       Review of patient's allergies indicates:   Allergen Reactions    Shellfish  containing products          No current facility-administered medications on file prior to encounter.     No current outpatient medications on file prior to encounter.     Family History    None       Tobacco Use    Smoking status: Never Smoker    Smokeless tobacco: Not on file   Substance and Sexual Activity    Alcohol use: Never    Drug use: Never    Sexual activity: Yes     Review of Systems   Neurological:  Positive for headaches.   All other systems reviewed and are negative.    Objective:     Vital Signs (Most Recent):  Temp: 99.1 °F (37.3 °C) (08/09/22 2000)  Pulse: 63 (08/10/22 1100)  Resp: 18 (08/10/22 1100)  BP: (!) 140/75 (08/10/22 1337)  SpO2: 98 % (08/10/22 1100)   Vital Signs (24h Range):  Temp:  [98.1 °F (36.7 °C)-99.1 °F (37.3 °C)] 99.1 °F (37.3 °C)  Pulse:  [59-80] 63  Resp:  [11-24] 18  SpO2:  [97 %-100 %] 98 %  BP: (122-160)/(53-95) 140/75     Weight: 68.9 kg (151 lb 14.4 oz)  Body mass index is 25.28 kg/m².    Physical Exam  Constitutional:       General: He is not in acute distress.     Appearance: Normal appearance. He is not ill-appearing.   Eyes:      Extraocular Movements: Extraocular movements intact.      Pupils: Pupils are equal, round, and reactive to light.   Cardiovascular:      Rate and Rhythm: Normal rate and regular rhythm.   Pulmonary:      Effort: Pulmonary effort is normal.   Musculoskeletal:         General: Normal range of motion.   Skin:     General: Skin is warm and dry.      Capillary Refill: Capillary refill takes less than 2 seconds.   Neurological:      General: No focal deficit present.      Mental Status: He is alert. Mental status is at baseline.      Comments:   Mental Status: Alert, oriented x 4, follows commands reliably  Language/Speech: speech clear, fluent, coherent  Cranial Nerves:  -CN II, III: PERRLA, no visual field deficit, ptosis absent  -CN III, IV, VI: EOMI, no gaze preference, no nystagmus  -CN V: facial tactile sensation intact symmetrical  -CN  VII: no facial asymmetry  -CN VIII: normal hearing to speech  -CN XI: head turning and shoulder shrug intact  -CN XII: tongue protrusion midline  Motor: BUE 5/5, BLE 5/5; no lateralizing or focal deficits noted  Sensory: normal to tactile stimulation  Cerebellar: no tremor or dysmetria  Memory: normal thought process intact  Gait: not observed   Psychiatric:         Mood and Affect: Mood normal.         Behavior: Behavior normal.       Significant Labs: BMP:   Recent Labs   Lab 08/09/22 0133 08/10/22  0203    135*   K 3.5 4.1   CO2 24 24   BUN 8.1* 7.7*   CREATININE 0.85 0.90   CALCIUM 9.0 9.4     CBC:   Recent Labs   Lab 08/09/22  0133 08/10/22  0203   WBC 10.1 10.2   HGB 13.6* 15.0   HCT 43.1 48.5    242       Significant Imaging: I have reviewed all pertinent imaging results/findings within the past 24 hours.      Assessment and Plan:     SAH (subarachnoid hemorrhage)    Keep NPO at this time  Dr Ivory planning to take patient for cerebral angiogram later today  Continue SAH protocol            Thank you for your consult. Further recommendations to follow from MD following the procedure.    Kira Farrell, FNP  Neurology  [facility] - [facility]

## 2022-08-10 NOTE — PROGRESS NOTES
Pulmonary & Critical Care Medicine       Progress Note      Presenting History/HPI:  46 AAF with no reported PMH presented for sudden-on headache. Patient was watching TV, laying in bed around 12AM when he started experiencing intense pressure/pain in the occipital region radiating to the neck a/w dizziness/N/V for which he took 2 enma's aspirin and heating pack with some improvement, but ultimately decided to come to the ED accompanied by wife due to persistence of HA. No prior episodes. No bowel or bladder dysfunction. In the ED imaging CTH revealed subtle SAH with obstructing hydrocephalus above 4th ventricle. NSGY, Dr. Buchanan consulted by ED physician, recommended CTA head and neck for now and ICU admission for q1h neurochecks.       Interval History:  -no major issues or changes overnight  -denies nausea vomiting visual disturbance or generalized weakness  -transfer held on the evening of 8/9, plans to go to the cath lab today with Dr. Ivory after discussion for interventional procedure      Scheduled Medications:    levetiracetam IV  500 mg Intravenous Q12H    niMODipine  60 mg Oral Q4H       PRN Medications:   hydrALAZINE, labetalol, morphine, ondansetron, sodium chloride 0.9%      Infusions:     sodium chloride 0.9% 125 mL/hr at 08/10/22 0321         Fluid Balance:     Intake/Output Summary (Last 24 hours) at 8/10/2022 1028  Last data filed at 8/10/2022 0200  Gross per 24 hour   Intake 2858 ml   Output 2250 ml   Net 608 ml           Vital Signs:   Vitals:    08/10/22 1005   BP:    Pulse:    Resp: 18   Temp:          Physical Exam  Vitals and nursing note reviewed.   Constitutional:       General: He is not in acute distress.     Appearance: Normal appearance. He is not ill-appearing or toxic-appearing.   HENT:      Head: Normocephalic and atraumatic.      Right Ear: External ear normal.      Left Ear: External ear normal.      Nose: Nose normal.      Mouth/Throat:      Mouth: Mucous membranes are  moist.      Pharynx: Oropharynx is clear. No oropharyngeal exudate or posterior oropharyngeal erythema.   Eyes:      General: No scleral icterus.     Extraocular Movements: Extraocular movements intact.      Conjunctiva/sclera: Conjunctivae normal.      Pupils: Pupils are equal, round, and reactive to light.   Neck:      Vascular: No carotid bruit.   Cardiovascular:      Rate and Rhythm: Normal rate and regular rhythm.      Pulses: Normal pulses.      Heart sounds: Normal heart sounds. No murmur heard.    No friction rub. No gallop.   Pulmonary:      Effort: Pulmonary effort is normal. No respiratory distress.      Breath sounds: Normal breath sounds. No wheezing, rhonchi or rales.   Abdominal:      General: Abdomen is flat. Bowel sounds are normal. There is no distension.      Palpations: Abdomen is soft.      Tenderness: There is no abdominal tenderness. There is no guarding or rebound.   Genitourinary:     Comments: deferred  Musculoskeletal:         General: No swelling or deformity. Normal range of motion.      Cervical back: Normal range of motion and neck supple. No rigidity or tenderness.   Lymphadenopathy:      Cervical: No cervical adenopathy.   Skin:     General: Skin is warm and dry.      Capillary Refill: Capillary refill takes less than 2 seconds.      Coloration: Skin is not jaundiced.      Findings: No bruising, lesion or rash.   Neurological:      General: No focal deficit present.      Mental Status: He is alert.      Sensory: No sensory deficit.      Motor: No weakness.   Psychiatric:         Mood and Affect: Mood normal.         Laboratory Studies:   No results for input(s): PH, PCO2, PO2, HCO3, POCSATURATED, BE in the last 24 hours.  Recent Labs   Lab 08/10/22  0203   WBC 10.2   RBC 5.78   HGB 15.0   HCT 48.5      MCV 83.9   MCH 26.0*   MCHC 30.9*     Recent Labs   Lab 08/10/22  0203   GLUCOSE 116*   *   K 4.1   CO2 24   BUN 7.7*   CREATININE 0.90         Microbiology Data:    Microbiology Results (last 7 days)     ** No results found for the last 168 hours. **            Imaging:   X-Ray Chest 1 View  Narrative: EXAMINATION:  XR CHEST 1 VIEW    TECHNIQUE:  AP chest    COMPARISON:  None.    FINDINGS:  The heart is normal in size.  There is no focal airspace consolidation.  There is no pleural effusion or visible pneumothorax.  Impression: No acute abnormality of the chest.    Electronically signed by: Nithya Mason  Date:    08/08/2022  Time:    11:17  CTA Head and Neck (xpd)  Narrative: EXAMINATION:  CTA HEAD AND NECK (XPD)    CLINICAL HISTORY:  SAH;    TECHNIQUE:  Axial images obtained through the cervical region and [place] of [place] before and after the administration of intravenous contrast.    Coronal, sagittal, MIP and 3D reconstructions were obtained from the axial data.    Automatic exposure control was utilized to limit radiation dose.    Radiation Dose:    Total DLP: 1692 mGy*cm    COMPARISON:  CT of the head from the same day    FINDINGS:  Head CT with contrast:    No interval changes when compared to the previous CT.    No enhancing abnormalities.    If present, stenosis of the carotid bulbs is measured based on NASCET criteria,    i.e. area of maximal stenosis compared to the cervical ICA distal to the bulb.    Cervical CTA:    The origins of the great vessels are patent with a common origin of the brachiocephalic trunk and left common carotid artery.    The common carotid arteries, carotid bulbs and internal carotid arteries are normal in caliber.    The vertebral arteries are patent.    Intracranial CTA:    The internal carotid arteries, middle cerebral arteries and anterior cerebral arteries are patent and normal in caliber.    The vertebral arteries are patent.  There is mild multifocal narrowing and irregularity of the basilar artery.  There is also irregularity of the left proximal fetal type posterior cerebral artery.  Impression: Mild multifocal narrowing and  irregularity of the basilar artery and the proximal left fetal type posterior cerebral artery, which may be due to vasospasm.  Small underlying aneurysm is not excluded and conventional angiogram is suggested.    No major change from the [facility] interpretation.    Electronically signed by: Nithya Mason  Date:    08/08/2022  Time:    09:53  CT Head Without Contrast  Narrative: EXAMINATION:  CT HEAD WITHOUT CONTRAST    CLINICAL HISTORY:  Subarachnoid hemorrhage (SAH) suspected;HA x 3.5 hours, N/V x3;    TECHNIQUE:  Axial scans were obtained from skull base to the vertex.    Coronal and sagittal reconstructions obtained from the axial data.    Automatic exposure control was utilized to limit radiation dose.    Contrast: None    Radiation Dose:    Total DLP: 1013 mGy*cm    COMPARISON:  None    FINDINGS:  There is subarachnoid hemorrhage centered in the prepontine cistern, with small amount extending into the bilateral sylvian fissures.  Small amount of hemorrhage is seen in the 4th ventricle.  The gray-white matter differentiation is preserved    There is sulcal effacement and mild dilatation of the lateral ventricles concerning for developing hydrocephalus.  There is no midline shift or downward herniation.    The calvarium and skull base are intact.  There is trace scattered paranasal sinus mucosal thickening.  The mastoid air cells are clear.  Impression: Subarachnoid hemorrhage in the prepontine cistern and sylvian fissures, small volume intraventricular hemorrhage and developing hydrocephalus.    No significant change from the [facility] interpretation.    Electronically signed by: Nithya Mason  Date:    08/08/2022  Time:    09:30          Assessment and Plan    Assessment:  Subarachnoid hemorrhage with evidence of possible obstructive hydrocephalus with blood in the 4th ventricle and ventriculomegaly in the 3rd and 4th ventricle with associated possible left posterior internal carotid artery distribution  aneurysm          Plan:  -continue recommendations per Neurosurgery with Keppra for prophylaxis in addition to nimodipine  -goal systolic pressure at 140 or less  -patient hemodynamically stable and neurologically stable with no decompensation, plans to go to the cath lab today with Dr. Ivory for interventional procedure    DVT prophylaxis with SCD  NPO for now, small sips of water with medications    The above plan was discussed in depth with the patient and his wife at bedside, all questions were answered      Serafin Falcon MD  8/10/2022  Pulmonology/Critical Care

## 2022-08-10 NOTE — ASSESSMENT & PLAN NOTE
Keep NPO at this time  Dr Ivory planning to take patient for cerebral angiogram later today  Continue SAH protocol

## 2022-08-10 NOTE — OP NOTE
[facility]                       [address] [place]                      [place] [address]    PATIENT NAME:      ROBERTO CARLOS CHANCE   YOB: 1975  CSN:               [id number]  MRN:               [id number]  ADMIT DATE:        08/08/2022 04:22:00  PHYSICIAN:         Darek Ivory MD                          OPERATIVE REPORT      DATE OF SURGERY:    08/10/2022 00:00:00    SURGEON:  Darek Ivory MD    PREOPERATIVE DIAGNOSIS:  Subarachnoid hemorrhage.    POSTOPERATIVE DIAGNOSES:    1. No evidence of aneurysm.  2. Mild vasospasm involving the basilar artery.    PROCEDURES PERFORMED:    1. Endovascular intracranial prolonged administration of verapamil for   vasospasm.    2. Cerebral angiogram with catheter insertion in the following arteries:  Right   common carotid, right internal carotid, right subclavian, right vertebral, left   common carotid, left internal carotid, left subclavian, left vertebral.    COMPLICATIONS:   None.    SEDATION:  General endotracheal.    DETAILED DESCRIPTION:  Following informed consent, and with the patient under   general endotracheal anesthesia, he was prepped and draped in usual sterile   fashion.  I punctured the right femoral artery, placing a 5-Iraqi sheath and   then upsized it to an 8-Iraqi sheath without incident.  I introduced my   catheter and wire and advanced them to the aortic arch.  I selected the right   common carotid artery.  Angiographic images demonstrated a normal bifurcation.    I selected the right internal carotid artery.  Cerebral AP, lateral, and oblique   views demonstrated no evidence of significant stenosis, mass effect, vascular   malformation, or early venous drainage.  I next selected the right subclavian   artery.  Angiographic images demonstrated no stenosis at the origin of the right   vertebral artery.  I selected the right vertebral artery.  Cerebral AP and   lateral views  demonstrated mild vasospasm involving the basilar artery.  I next   selected the left common carotid artery.  Angiographic images demonstrated a   normal bifurcation.  I selected the left internal carotid artery.  Cerebral AP   and lateral views demonstrated no evidence of significant stenosis, mass effect,   vascular malformation, or early venous drainage.  I then selected the left   subclavian artery.  Angiographic images demonstrated no stenosis at the origin   of the left vertebral artery.  I selected the left vertebral artery.  Cerebral   AP and lateral views again demonstrated the mild spasm.  I performed prolonged   administration of verapamil through the catheter in aliquots.  A total of 4 mg   were administered.  Followup angiography demonstrated improvement.      I removed the catheter.  The sheath was secured in place.  The patient tolerated   the procedure well.  There were no apparent complications.        ______________________________  Darek Ivory MD    DEP/AQS  DD:  08/10/2022  Time:  04:07PM  DT:  08/10/2022  Time:  04:33PM  Job #:  [id number]      OPERATIVE REPORT

## 2022-08-10 NOTE — PROGRESS NOTES
[facility] [address]  Neurosurgery  Progress note    Consults  Subjective:     Chief Complaint/Reason for Admission: HA    History of Present Illness: Patient is a 47yo male with no significant PMHX, who presented to ED this am with c/o pressure to the back of his head that began suddenly around 0030 this am, waking him from his sleep. He reports the pain from his head radiated down to his shoulders. He did take some medication and laid back down. Pt reports he felt better after this, but then the pain to his head soon returned. He reports 3 episodes of vomiting. He denies any previous neck/back sx, vision changes, or any other medical hx. Pt also denies ETOH, smoking, or drugs.     On arrival to the ED a CT head revealed SAH in the prepontine cistern and sylvian fissures with a small volume IVH. Dr. Buchanan was consulted for evaluation and treatment recommendations.    08/09/2022:  Patient with no headache and nausea at this time.  Resting in bed quietly.  Vital signs stable.  No new issues.  Triple H therapy and progress.  Nursing reports that if transfer does not occur before mid day we will cancel it and Dr. Ivory will be consulted.    08/10/2022:  Sitting up in bed visiting with family.  Dr. Ivory aware and will be taking patient for cerebral angio.  Spoke with Kira with Neurology as well.  Continue triple H therapy.  Vital signs are stable.  No new issues.  Minimal headache.        Review of patient's allergies indicates:   Allergen Reactions    Shellfish containing products        No past medical history on file.  Past Surgical History:   Procedure Laterality Date    APPENDECTOMY      FRACTURE SURGERY Right     pinky finger     Family History    None       Tobacco Use    Smoking status: Never Smoker    Smokeless tobacco: Not on file   Substance and Sexual Activity    Alcohol use: Never    Drug use: Never    Sexual activity: Yes     Review of Systems   All other systems  reviewed and are negative.    Objective:     12 pt ROS WNL, except for HPI    Weight: 68.9 kg (151 lb 14.4 oz)  Body mass index is 25.28 kg/m².  Vital Signs (Most Recent):  Temp: 99.1 °F (37.3 °C) (08/09/22 2000)  Pulse: 63 (08/10/22 1100)  Resp: 18 (08/10/22 1100)  BP: (!) 141/80 (08/10/22 1100)  SpO2: 98 % (08/10/22 1100) Vital Signs (24h Range):  Temp:  [98.1 °F (36.7 °C)-99.1 °F (37.3 °C)] 99.1 °F (37.3 °C)  Pulse:  [59-80] 63  Resp:  [11-24] 18  SpO2:  [93 %-100 %] 98 %  BP: (122-160)/(53-95) 141/80         Physical Exam:    Constitutional: He appears well-developed and well-nourished. No distress.     Eyes: Pupils are equal, round, and reactive to light. EOM are normal.   Fundoscopic exam:       The right eye shows no hemorrhage.        The left eye shows no hemorrhage.     Cardiovascular: Normal rate, regular rhythm and intact distal pulses.     Abdominal: Soft. Bowel sounds are normal.     Psych/Behavior: He is alert. He is oriented to person, place, and time. He has a normal mood and affect.     Musculoskeletal:        Neck: Range of motion is full.        Back: Range of motion is full.        Right Upper Extremities: Range of motion is full. Muscle strength is 5/5.        Left Upper Extremities: Range of motion is full. Muscle strength is 5/5.       Right Lower Extremities: Range of motion is full. Muscle strength is 5/5.        Left Lower Extremities: Range of motion is full. Muscle strength is 5/5.     Neurological:        DTRs: DTRs are DTRS NORMAL AND SYMMETRICnormal and symmetric.        Cranial nerves: Cranial nerve(s) II, III, IV, V, VI, VII, VIII, IX, X, XI and XII are intact.   Neg pronator drift      Significant Labs:  Recent Labs   Lab 08/09/22  0133 08/10/22  0203    135*   K 3.5 4.1   CO2 24 24   BUN 8.1* 7.7*   CREATININE 0.85 0.90   CALCIUM 9.0 9.4     Recent Labs   Lab 08/09/22  0133 08/10/22  0203   WBC 10.1 10.2   HGB 13.6* 15.0   HCT 43.1 48.5    242     No results for  input(s): LABPT, INR, APTT in the last 48 hours.  Microbiology Results (last 7 days)     ** No results found for the last 168 hours. **          Significant Diagnostics:  CT Head Without Contrast [[id number]] Resulted: 08/08/22 0930   Order Status: Completed Updated: 08/08/22 0933   Narrative:     EXAMINATION:   CT HEAD WITHOUT CONTRAST     CLINICAL HISTORY:   Subarachnoid hemorrhage (SAH) suspected;HA x 3.5 hours, N/V x3;     TECHNIQUE:   Axial scans were obtained from skull base to the vertex.     Coronal and sagittal reconstructions obtained from the axial data.     Automatic exposure control was utilized to limit radiation dose.     Contrast: None     Radiation Dose:     Total DLP: 1013 mGy*cm     COMPARISON:   None     FINDINGS:   There is subarachnoid hemorrhage centered in the prepontine cistern, with small amount extending into the bilateral sylvian fissures.  Small amount of hemorrhage is seen in the 4th ventricle.  The gray-white matter differentiation is preserved     There is sulcal effacement and mild dilatation of the lateral ventricles concerning for developing hydrocephalus.  There is no midline shift or downward herniation.     The calvarium and skull base are intact.  There is trace scattered paranasal sinus mucosal thickening.  The mastoid air cells are clear.    Impression:       Subarachnoid hemorrhage in the prepontine cistern and sylvian fissures, small volume intraventricular hemorrhage and developing hydrocephalus.     No significant change from the [facility] interpretation.      CTA Head and Neck (xpd) [[id number]] Resulted: 08/08/22 0546   Order Status: Completed Updated: 08/08/22 0633   Narrative:     START OF REPORT:   Technique: CT angiogram of the intracranial vessels was performed with intravenous contrast.     Comparison: None.     Clinical history: Headache.     Findings:   Intracranial Vascular structures: Unremarkable.   Internal carotid arteries: Unremarkable.   Middle cerebral  arteries: Unremarkable.   Anterior cerebral arteries: Unremarkable.   Vertebral arteries: Unremarkable.   Basilar artery: Unremarkable.   Posterior cerebral arteries: Fetal origin of the bilateral posterior cerebral artery is seen with hypoplastic P1 segment on the left. There is some irregularity of the fetal originating left posterior cerebral artery off the left internal carotid with 2 small irregular saccular foci best seen on on image 153 of series 7 proximal to the convergence with the left P1 segment of the left posterior cerebral artery.   Jugular Veins and venous sinuses: Unremarkable.   Neck Vascular structures: The visualized aorta and origin of the great vessels of the neck appear unremarkable.   Carotids: Unremarkable.   Common carotid arteries: Unremarkable.   Internal carotid artery: Unremarkable.   Vertebral arteries: Unremarkable.   Jugular Veins and venous sinuses: Unremarkable.   Hemorrhage: Again seen is the stable appearing subarachnoid hemorrhage in the prepontine cistern and in the fourth ventricle. There continues to be prominence of the lateral third and fourth ventricles which may reflect early obstructive hydrocephalus.   Brain parenchyma: No abnormal intracranial enhancement is identified.     Notifications: The results were discussed with the emergency room physician prior to dictation at 2022-08-08 06:16:04 CDT.       Impression:   1. There is some irregularity of the fetal originating left posterior cerebral artery off the left internal carotid with 2 small irregular saccular foci best seen on on image 153 of series 7 proximal to the convergence with the left P1 segment of the left posterior cerebral artery. One or both of these may reflect tiny aneurysms. Correlate clinically as regards additional evaluation and follow-up.   2. Again seen is the stable appearing subarachnoid hemorrhage in the prepontine cistern and in the fourth ventricle. There continues to be prominence of the  lateral third and fourth ventricles which may reflect early obstructive hydrocephalus. Correlate clinically and recommend continued serial interval follow-up as indicated.   3. Details and other findings as described above.          Assessment/Plan:       Triple H therapy and progress  Patient does have IV fluids infusing.  Do not discontinue.  Nimodipine  Keppra seizure precautions  Blood pressure less than 120 systolic.  Continue neurological exams hourly.  SCD    Cerebral angiogram with possible intervention with Dr. Ivory today.        Marciano Chowdary, [facility]-BC  Neurosurgery  [facility] - [facility] ICU

## 2022-08-10 NOTE — PLAN OF CARE
Problem: Adjustment to Illness (Stroke, Hemorrhagic)  Goal: Optimal Coping  Outcome: Ongoing, Progressing     Problem: Bowel Elimination Impaired (Stroke, Hemorrhagic)  Goal: Effective Bowel Elimination  Outcome: Ongoing, Progressing     Problem: Cerebral Tissue Perfusion (Stroke, Hemorrhagic)  Goal: Optimal Cerebral Tissue Perfusion  Outcome: Ongoing, Progressing     Problem: Cognitive Impairment (Stroke, Hemorrhagic)  Goal: Optimal Cognitive Function  Outcome: Ongoing, Progressing     Problem: Communication Impairment (Stroke, Hemorrhagic)  Goal: Effective Communication Skills  Outcome: Ongoing, Progressing     Problem: Functional Ability Impaired (Stroke, Hemorrhagic)  Goal: Optimal Functional Ability  Outcome: Ongoing, Progressing     Problem: Pain (Stroke, Hemorrhagic)  Goal: Acceptable Pain Control  Outcome: Ongoing, Progressing     Problem: Respiratory Compromise (Stroke, Hemorrhagic)  Goal: Effective Oxygenation and Ventilation  Outcome: Ongoing, Progressing     Problem: Sensorimotor Impairment (Stroke, Hemorrhagic)  Goal: Improved Sensorimotor Function  Outcome: Ongoing, Progressing     Problem: Swallowing Impairment (Stroke, Hemorrhagic)  Goal: Optimal Eating and Swallowing Without Aspiration  Outcome: Ongoing, Progressing     Problem: Urinary Elimination Impaired (Stroke, Hemorrhagic)  Goal: Effective Urinary Elimination  Outcome: Ongoing, Progressing     Problem: Adult Inpatient Plan of Care  Goal: Plan of Care Review  Outcome: Ongoing, Progressing  Goal: Patient-Specific Goal (Individualized)  Outcome: Ongoing, Progressing  Goal: Absence of Hospital-Acquired Illness or Injury  Outcome: Ongoing, Progressing  Goal: Optimal Comfort and Wellbeing  Outcome: Ongoing, Progressing  Goal: Readiness for Transition of Care  Outcome: Ongoing, Progressing

## 2022-08-10 NOTE — ANESTHESIA PREPROCEDURE EVALUATION
08/10/2022  Canelo Blank is a 46 y.o., male presented with a headache and further workup revealed subarachnoid hemorrhage with hydrocephalus hand ruptured aneurysm.  Patient in the ICU oriented 1 spasm prophylaxis.  Now requiring embolization.  [id number].  Hx per patient and wife    Last 3 sets of Vitals    Vitals - 1 value per visit 8/10/2022 8/10/2022 8/10/2022   SYSTOLIC - 122 141   DIASTOLIC - 87 80   Pulse 61 63 63   Temp - - -   Resp 16 17 18   SPO2 99 98 98   Weight (lb) - - -   Weight (kg) - - -   Height - - -   BMI (Calculated) - - -         Lab Results   Component Value Date    WBC 10.2 08/10/2022    HGB 15.0 08/10/2022    HCT 48.5 08/10/2022    MCV 83.9 08/10/2022     08/10/2022          BMP  Lab Results   Component Value Date     (L) 08/10/2022    K 4.1 08/10/2022    CO2 24 08/10/2022    BUN 7.7 (L) 08/10/2022    CREATININE 0.90 08/10/2022    CALCIUM 9.4 08/10/2022        CMP  Sodium Level   Date Value Ref Range Status   08/10/2022 135 (L) 136 - 145 mmol/L Final     Potassium Level   Date Value Ref Range Status   08/10/2022 4.1 3.5 - 5.1 mmol/L Final     Carbon Dioxide   Date Value Ref Range Status   08/10/2022 24 22 - 29 mmol/L Final     Blood Urea Nitrogen   Date Value Ref Range Status   08/10/2022 7.7 (L) 8.9 - 20.6 mg/dL Final     Creatinine   Date Value Ref Range Status   08/10/2022 0.90 0.73 - 1.18 mg/dL Final     Calcium Level Total   Date Value Ref Range Status   08/10/2022 9.4 8.4 - 10.2 mg/dL Final     Albumin Level   Date Value Ref Range Status   08/08/2022 4.3 3.5 - 5.0 gm/dL Final     Bilirubin Total   Date Value Ref Range Status   08/08/2022 1.2 <=1.5 mg/dL Final     Alkaline Phosphatase   Date Value Ref Range Status   08/08/2022 57 40 - 150 unit/L Final     Aspartate Aminotransferase   Date Value Ref Range Status   08/08/2022 16 5 - 34 unit/L Final     Alanine  Aminotransferase   Date Value Ref Range Status   08/08/2022 14 0 - 55 unit/L Final       Lab Results   Component Value Date    HGBA1C 5.6 08/08/2022      Lab Results   Component Value Date    INR 1.05 08/08/2022    PROTIME 13.6 08/08/2022       Pre-op Assessment    I have reviewed the Patient Summary Reports.     I have reviewed the Nursing Notes. I have reviewed the NPO Status.   I have reviewed the Medications.     Review of Systems  Anesthesia Hx:  Personal Hx of Anesthesia complications Denies Post-Operative Nausea/Vomiting.  Denies Difficult Intubation.  Denies Dental Injury.   Social:  Non-Smoker    Cardiovascular:  Functional Capacity good / => 4 METS        Physical Exam  General: Well nourished, Cooperative, Alert and Oriented    Airway:  Mallampati: II   Mouth Opening: Normal  TM Distance: Normal  Tongue: Normal  Neck ROM: Normal ROM    Dental:  Intact    Chest/Lungs:  Clear to auscultation, Normal Respiratory Rate    Heart:  Rate: Normal  Rhythm: Regular Rhythm        Anesthesia Plan  Type of Anesthesia, risks & benefits discussed:    Anesthesia Type: Gen ETT  Intra-op Monitoring Plan: Standard ASA Monitors and Art Line  Post Op Pain Control Plan: multimodal analgesia and IV/PO Opioids PRN  Induction:  IV  Airway Plan: Direct  Informed Consent: Informed consent signed with the Patient representative and all parties understand the risks and agree with anesthesia plan.  All questions answered.   ASA Score: 4  Day of Surgery Review of History & Physical: H&P Update referred to the surgeon/provider.    Ready For Surgery From Anesthesia Perspective.     .

## 2022-08-10 NOTE — HPI
Canelo Blank is a 46-year-old male with no significant medical history, presented to ED 8/8 with reports of severe HA.  He also complained of neck pain radiating down to his shoulders.  He took BC powder at home, but headache worsened.  Wife reports that he vomited prior to ED arrival.  CTh showed SAH with obstructing hydrocephalus.  CTA h/n showed some irregularity of the fetal originating left posterior cerebral artery off the left internal carotid with 2 small irregular saccular foci.  Initial plan was to transfer patient to a facility in [place] for interventional neurology evaluation, but there were no beds available for them to accept the patient.     Discussed with Dr Ivory, who stated he discussed case with neurosurgery and is planning to take patient to cath lab for embolization later today.

## 2022-08-11 LAB
ANION GAP SERPL CALC-SCNC: 8 MEQ/L
BASOPHILS # BLD AUTO: 0.01 X10(3)/MCL (ref 0–0.2)
BASOPHILS NFR BLD AUTO: 0.1 %
BUN SERPL-MCNC: 10.1 MG/DL (ref 8.9–20.6)
CALCIUM SERPL-MCNC: 9 MG/DL (ref 8.4–10.2)
CHLORIDE SERPL-SCNC: 106 MMOL/L (ref 98–107)
CO2 SERPL-SCNC: 23 MMOL/L (ref 22–29)
CREAT SERPL-MCNC: 0.84 MG/DL (ref 0.73–1.18)
CREAT/UREA NIT SERPL: 12
EOSINOPHIL # BLD AUTO: 0 X10(3)/MCL (ref 0–0.9)
EOSINOPHIL NFR BLD AUTO: 0 %
ERYTHROCYTE [DISTWIDTH] IN BLOOD BY AUTOMATED COUNT: 14.2 % (ref 11.5–17)
GFR SERPLBLD CREATININE-BSD FMLA CKD-EPI: >60 MLS/MIN/1.73/M2
GLUCOSE SERPL-MCNC: 145 MG/DL (ref 74–100)
HCT VFR BLD AUTO: 43.6 % (ref 42–52)
HGB BLD-MCNC: 13.8 GM/DL (ref 14–18)
IMM GRANULOCYTES # BLD AUTO: 0.03 X10(3)/MCL (ref 0–0.04)
IMM GRANULOCYTES NFR BLD AUTO: 0.4 %
LYMPHOCYTES # BLD AUTO: 0.35 X10(3)/MCL (ref 0.6–4.6)
LYMPHOCYTES NFR BLD AUTO: 4.4 %
MCH RBC QN AUTO: 25.7 PG (ref 27–31)
MCHC RBC AUTO-ENTMCNC: 31.7 MG/DL (ref 33–36)
MCV RBC AUTO: 81.3 FL (ref 80–94)
MONOCYTES # BLD AUTO: 0.23 X10(3)/MCL (ref 0.1–1.3)
MONOCYTES NFR BLD AUTO: 2.9 %
NEUTROPHILS # BLD AUTO: 7.3 X10(3)/MCL (ref 2.1–9.2)
NEUTROPHILS NFR BLD AUTO: 92.2 %
NRBC BLD AUTO-RTO: 0 %
PLATELET # BLD AUTO: 236 X10(3)/MCL (ref 130–400)
PMV BLD AUTO: 10.9 FL (ref 7.4–10.4)
POTASSIUM SERPL-SCNC: 3.6 MMOL/L (ref 3.5–5.1)
RBC # BLD AUTO: 5.36 X10(6)/MCL (ref 4.7–6.1)
SODIUM SERPL-SCNC: 137 MMOL/L (ref 136–145)
WBC # SPEC AUTO: 7.9 X10(3)/MCL (ref 4.5–11.5)

## 2022-08-11 PROCEDURE — 20000000 HC ICU ROOM

## 2022-08-11 PROCEDURE — 63600175 PHARM REV CODE 636 W HCPCS: Mod: JG | Performed by: STUDENT IN AN ORGANIZED HEALTH CARE EDUCATION/TRAINING PROGRAM

## 2022-08-11 PROCEDURE — 36592 COLLECT BLOOD FROM PICC: CPT | Performed by: STUDENT IN AN ORGANIZED HEALTH CARE EDUCATION/TRAINING PROGRAM

## 2022-08-11 PROCEDURE — C9248 INJ, CLEVIDIPINE BUTYRATE: HCPCS | Mod: JG | Performed by: STUDENT IN AN ORGANIZED HEALTH CARE EDUCATION/TRAINING PROGRAM

## 2022-08-11 PROCEDURE — 99232 SBSQ HOSP IP/OBS MODERATE 35: CPT | Mod: ,,, | Performed by: NEUROLOGICAL SURGERY

## 2022-08-11 PROCEDURE — 63600175 PHARM REV CODE 636 W HCPCS: Performed by: STUDENT IN AN ORGANIZED HEALTH CARE EDUCATION/TRAINING PROGRAM

## 2022-08-11 PROCEDURE — 85025 COMPLETE CBC W/AUTO DIFF WBC: CPT | Performed by: STUDENT IN AN ORGANIZED HEALTH CARE EDUCATION/TRAINING PROGRAM

## 2022-08-11 PROCEDURE — 25000003 PHARM REV CODE 250: Performed by: INTERNAL MEDICINE

## 2022-08-11 PROCEDURE — 25000003 PHARM REV CODE 250: Performed by: NURSE PRACTITIONER

## 2022-08-11 PROCEDURE — 25000003 PHARM REV CODE 250: Performed by: STUDENT IN AN ORGANIZED HEALTH CARE EDUCATION/TRAINING PROGRAM

## 2022-08-11 PROCEDURE — 25000003 PHARM REV CODE 250: Performed by: NEUROLOGICAL SURGERY

## 2022-08-11 PROCEDURE — 99232 PR SUBSEQUENT HOSPITAL CARE,LEVL II: ICD-10-PCS | Mod: ,,, | Performed by: NEUROLOGICAL SURGERY

## 2022-08-11 PROCEDURE — 80048 BASIC METABOLIC PNL TOTAL CA: CPT | Performed by: STUDENT IN AN ORGANIZED HEALTH CARE EDUCATION/TRAINING PROGRAM

## 2022-08-11 RX ORDER — HYDROCODONE BITARTRATE AND ACETAMINOPHEN 7.5; 325 MG/1; MG/1
1 TABLET ORAL EVERY 6 HOURS PRN
Status: DISCONTINUED | OUTPATIENT
Start: 2022-08-11 | End: 2022-08-22 | Stop reason: HOSPADM

## 2022-08-11 RX ORDER — LISINOPRIL 10 MG/1
10 TABLET ORAL DAILY
Status: DISCONTINUED | OUTPATIENT
Start: 2022-08-11 | End: 2022-08-22 | Stop reason: HOSPADM

## 2022-08-11 RX ORDER — ATROPINE SULFATE 0.1 MG/ML
INJECTION INTRAVENOUS
Status: DISCONTINUED
Start: 2022-08-11 | End: 2022-08-11 | Stop reason: WASHOUT

## 2022-08-11 RX ADMIN — HYDROCODONE BITARTRATE AND ACETAMINOPHEN 1 TABLET: 7.5; 325 TABLET ORAL at 12:08

## 2022-08-11 RX ADMIN — MORPHINE SULFATE 2 MG: 4 INJECTION INTRAVENOUS at 05:08

## 2022-08-11 RX ADMIN — NIMODIPINE 60 MG: 30 CAPSULE, LIQUID FILLED ORAL at 09:08

## 2022-08-11 RX ADMIN — MORPHINE SULFATE 2 MG: 4 INJECTION INTRAVENOUS at 09:08

## 2022-08-11 RX ADMIN — NIMODIPINE 60 MG: 30 CAPSULE, LIQUID FILLED ORAL at 01:08

## 2022-08-11 RX ADMIN — NIMODIPINE 60 MG: 30 CAPSULE, LIQUID FILLED ORAL at 05:08

## 2022-08-11 RX ADMIN — SODIUM CHLORIDE: 9 INJECTION, SOLUTION INTRAVENOUS at 05:08

## 2022-08-11 RX ADMIN — CLEVIPIDINE 2 MG/HR: 0.5 EMULSION INTRAVENOUS at 10:08

## 2022-08-11 RX ADMIN — CLEVIPIDINE 4 MG/HR: 0.5 EMULSION INTRAVENOUS at 05:08

## 2022-08-11 RX ADMIN — LEVETIRACETAM 500 MG: 100 INJECTION, SOLUTION, CONCENTRATE INTRAVENOUS at 08:08

## 2022-08-11 RX ADMIN — MORPHINE SULFATE 2 MG: 4 INJECTION INTRAVENOUS at 06:08

## 2022-08-11 RX ADMIN — LISINOPRIL 10 MG: 10 TABLET ORAL at 10:08

## 2022-08-11 RX ADMIN — MORPHINE SULFATE 2 MG: 4 INJECTION INTRAVENOUS at 01:08

## 2022-08-11 RX ADMIN — MORPHINE SULFATE 2 MG: 4 INJECTION INTRAVENOUS at 12:08

## 2022-08-11 RX ADMIN — CLEVIPIDINE 5 MG/HR: 0.5 EMULSION INTRAVENOUS at 11:08

## 2022-08-11 RX ADMIN — SODIUM CHLORIDE: 9 INJECTION, SOLUTION INTRAVENOUS at 01:08

## 2022-08-11 NOTE — SUBJECTIVE & OBJECTIVE
Subjective:     Interval History: Underwent cerebral angiogram yesterday ... no evidence of aneurysm, received verapamil for basilar artery vasospasm.  No new issues overnight.      Current Facility-Administered Medications   Medication Dose Route Frequency Provider Last Rate Last Admin    0.9%  NaCl infusion   Intravenous Continuous Celso Buchanan  mL/hr at 08/11/22 0512 New Bag at 08/11/22 0512    albuterol-ipratropium 2.5 mg-0.5 mg/3 mL nebulizer solution 3 mL  3 mL Nebulization Q1H PRN Rk Vickers Jr., MD        clevidipine (CLEVIPREX) 25 mg/50 mL infusion  0-16 mg/hr Intravenous Continuous Rashaun Jalloh DO 4 mL/hr at 08/11/22 1123 2 mg/hr at 08/11/22 1123    hydrALAZINE injection 10 mg  10 mg Intravenous Q2H PRN Jailyn Rodrigues MD   10 mg at 08/10/22 1632    HYDROcodone-acetaminophen 7.5-325 mg per tablet 1 tablet  1 tablet Oral Q6H PRN AMINATA Suero-BC   1 tablet at 08/11/22 1235    labetaloL injection 10 mg  10 mg Intravenous Q2H PRN Jailyn Rodrigues MD   10 mg at 08/10/22 0507    levETIRAcetam injection 500 mg  500 mg Intravenous Q12H Jailyn Rodrigues MD   500 mg at 08/11/22 0803    lisinopriL tablet 10 mg  10 mg Oral Daily Serafin Falcon MD   10 mg at 08/11/22 1010    morphine injection 2 mg  2 mg Intravenous Q4H PRN Jailyn Rodrigues MD   2 mg at 08/11/22 0952    niMODipine capsule 60 mg  60 mg Oral Q4H Serafin Rogers MD   60 mg at 08/11/22 0901    ondansetron injection 8 mg  8 mg Intravenous Q6H PRN Claudia Keys MD   4 mg at 08/10/22 1518    sodium chloride 0.9% flush 10 mL  10 mL Intravenous PRN Jailyn Rodrigues MD           Review of Systems   All other systems reviewed and are negative.    Objective:     Vital Signs (Most Recent):  Temp: 98.4 °F (36.9 °C) (08/11/22 0745)  Pulse: 62 (08/11/22 1245)  Resp: 12 (08/11/22 1245)  BP: 124/62 (08/11/22 1245)  SpO2: 98 % (08/11/22 1245) Vital Signs (24h Range):  Temp:  [98 °F (36.7 °C)-98.4 °F (36.9 °C)] 98.4 °F (36.9  °C)  Pulse:  [] 62  Resp:  [7-30] 12  SpO2:  [96 %-100 %] 98 %  BP: ()/(48-87) 124/62  Arterial Line BP: (160-178)/(65-73) 160/65     Weight: 68.9 kg (151 lb 14.4 oz)  Body mass index is 25.28 kg/m².      Physical Exam  Constitutional:       General: He is not in acute distress.     Appearance: Normal appearance. He is not ill-appearing.   Eyes:      Extraocular Movements: Extraocular movements intact.      Pupils: Pupils are equal, round, and reactive to light.   Cardiovascular:      Rate and Rhythm: Normal rate and regular rhythm.   Pulmonary:      Effort: Pulmonary effort is normal.   Musculoskeletal:         General: Normal range of motion.   Skin:     General: Skin is warm and dry.      Capillary Refill: Capillary refill takes less than 2 seconds.   Neurological:      General: No focal deficit present.      Mental Status: He is alert. Mental status is at baseline.      Comments:   Mental Status: Alert, oriented x 4, follows commands reliably  Language/Speech: speech clear, fluent, coherent  Cranial Nerves:  -CN II, III: PERRLA, no visual field deficit, ptosis absent  -CN III, IV, VI: EOMI, no gaze preference, no nystagmus  -CN V: facial tactile sensation intact symmetrical  -CN VII: no facial asymmetry  -CN VIII: normal hearing to speech  -CN XI: head turning and shoulder shrug intact  -CN XII: tongue protrusion midline  Motor: BUE 5/5, BLE 5/5; no lateralizing or focal deficits noted  Sensory: normal to tactile stimulation  Cerebellar: no tremor or dysmetria  Memory: normal thought process intact  Gait: not observed   Psychiatric:         Mood and Affect: Mood normal.         Behavior: Behavior normal.      Significant Labs: BMP:   Recent Labs   Lab 08/10/22  0203 08/11/22  0207   * 137   K 4.1 3.6   CO2 24 23   BUN 7.7* 10.1   CREATININE 0.90 0.84   CALCIUM 9.4 9.0     CBC:   Recent Labs   Lab 08/10/22  0203 08/11/22  0207   WBC 10.2 7.9   HGB 15.0 13.8*   HCT 48.5 43.6    236        Significant Imaging: I have reviewed all pertinent imaging results/findings within the past 24 hours.

## 2022-08-11 NOTE — PLAN OF CARE
Problem: Adjustment to Illness (Stroke, Hemorrhagic)  Goal: Optimal Coping  8/11/2022 0411 by Hector Shaw RN  Outcome: Ongoing, Progressing  8/11/2022 0411 by Hector Shaw RN  Outcome: Ongoing, Progressing     Problem: Bowel Elimination Impaired (Stroke, Hemorrhagic)  Goal: Effective Bowel Elimination  8/11/2022 0411 by Hector Shaw RN  Outcome: Ongoing, Progressing  8/11/2022 0411 by Hector Shaw RN  Outcome: Ongoing, Progressing     Problem: Cerebral Tissue Perfusion (Stroke, Hemorrhagic)  Goal: Optimal Cerebral Tissue Perfusion  8/11/2022 0411 by Hector Shaw RN  Outcome: Ongoing, Progressing  8/11/2022 0411 by Hector Shaw RN  Outcome: Ongoing, Progressing     Problem: Cognitive Impairment (Stroke, Hemorrhagic)  Goal: Optimal Cognitive Function  8/11/2022 0411 by Hector Shaw RN  Outcome: Ongoing, Progressing  8/11/2022 0411 by Hector Shaw RN  Outcome: Ongoing, Progressing     Problem: Communication Impairment (Stroke, Hemorrhagic)  Goal: Effective Communication Skills  8/11/2022 0411 by Hector Shaw RN  Outcome: Ongoing, Progressing  8/11/2022 0411 by Hector Shaw RN  Outcome: Ongoing, Progressing     Problem: Functional Ability Impaired (Stroke, Hemorrhagic)  Goal: Optimal Functional Ability  8/11/2022 0411 by Hector Shaw RN  Outcome: Ongoing, Progressing  8/11/2022 0411 by Hector Shaw RN  Outcome: Ongoing, Progressing     Problem: Pain (Stroke, Hemorrhagic)  Goal: Acceptable Pain Control  8/11/2022 0411 by Hector Shaw RN  Outcome: Ongoing, Progressing  8/11/2022 0411 by Hector Shaw RN  Outcome: Ongoing, Progressing     Problem: Respiratory Compromise (Stroke, Hemorrhagic)  Goal: Effective Oxygenation and Ventilation  8/11/2022 0411 by Hector Shaw RN  Outcome: Ongoing, Progressing  8/11/2022 0411 by Hector Shaw RN  Outcome: Ongoing, Progressing     Problem: Sensorimotor Impairment (Stroke, Hemorrhagic)  Goal: Improved Sensorimotor Function  8/11/2022 0411 by Hector Shaw RN  Outcome: Ongoing,  Progressing  8/11/2022 0411 by Hector Shaw RN  Outcome: Ongoing, Progressing     Problem: Swallowing Impairment (Stroke, Hemorrhagic)  Goal: Optimal Eating and Swallowing Without Aspiration  8/11/2022 0411 by Hector Shaw RN  Outcome: Ongoing, Progressing  8/11/2022 0411 by Hector Shaw RN  Outcome: Ongoing, Progressing     Problem: Urinary Elimination Impaired (Stroke, Hemorrhagic)  Goal: Effective Urinary Elimination  8/11/2022 0411 by Hector Shaw RN  Outcome: Ongoing, Progressing  8/11/2022 0411 by Hector Shaw RN  Outcome: Ongoing, Progressing     Problem: Adult Inpatient Plan of Care  Goal: Plan of Care Review  8/11/2022 0411 by Hector Shaw RN  Outcome: Ongoing, Progressing  8/11/2022 0411 by Hector Shaw RN  Outcome: Ongoing, Progressing  Goal: Patient-Specific Goal (Individualized)  8/11/2022 0411 by Hector Shaw RN  Outcome: Ongoing, Progressing  8/11/2022 0411 by Hector Shaw RN  Outcome: Ongoing, Progressing  Goal: Absence of Hospital-Acquired Illness or Injury  8/11/2022 0411 by Hector Shaw RN  Outcome: Ongoing, Progressing  8/11/2022 0411 by Hector Shaw RN  Outcome: Ongoing, Progressing  Goal: Optimal Comfort and Wellbeing  8/11/2022 0411 by Hector Sahw RN  Outcome: Ongoing, Progressing  8/11/2022 0411 by Hector Shaw RN  Outcome: Ongoing, Progressing  Goal: Readiness for Transition of Care  8/11/2022 0411 by Hector Shaw RN  Outcome: Ongoing, Progressing  8/11/2022 0411 by Hector Shaw RN  Outcome: Ongoing, Progressing     Problem: Infection  Goal: Absence of Infection Signs and Symptoms  8/11/2022 0411 by Hector Shaw RN  Outcome: Ongoing, Progressing  8/11/2022 0411 by Hector Shaw RN  Outcome: Ongoing, Progressing     Problem: Skin Injury Risk Increased  Goal: Skin Health and Integrity  8/11/2022 0411 by Hector Shaw RN  Outcome: Ongoing, Progressing  8/11/2022 0411 by Hector Shaw RN  Outcome: Ongoing, Progressing

## 2022-08-11 NOTE — PLAN OF CARE
Problem: Adjustment to Illness (Stroke, Hemorrhagic)  Goal: Optimal Coping  Outcome: Ongoing, Progressing     Problem: Bowel Elimination Impaired (Stroke, Hemorrhagic)  Goal: Effective Bowel Elimination  Outcome: Ongoing, Progressing     Problem: Cerebral Tissue Perfusion (Stroke, Hemorrhagic)  Goal: Optimal Cerebral Tissue Perfusion  Outcome: Ongoing, Progressing     Problem: Cognitive Impairment (Stroke, Hemorrhagic)  Goal: Optimal Cognitive Function  Outcome: Ongoing, Progressing     Problem: Communication Impairment (Stroke, Hemorrhagic)  Goal: Effective Communication Skills  Outcome: Ongoing, Progressing     Problem: Functional Ability Impaired (Stroke, Hemorrhagic)  Goal: Optimal Functional Ability  Outcome: Ongoing, Progressing     Problem: Pain (Stroke, Hemorrhagic)  Goal: Acceptable Pain Control  Outcome: Ongoing, Progressing     Problem: Respiratory Compromise (Stroke, Hemorrhagic)  Goal: Effective Oxygenation and Ventilation  Outcome: Ongoing, Progressing     Problem: Sensorimotor Impairment (Stroke, Hemorrhagic)  Goal: Improved Sensorimotor Function  Outcome: Ongoing, Progressing     Problem: Swallowing Impairment (Stroke, Hemorrhagic)  Goal: Optimal Eating and Swallowing Without Aspiration  Outcome: Ongoing, Progressing     Problem: Urinary Elimination Impaired (Stroke, Hemorrhagic)  Goal: Effective Urinary Elimination  Outcome: Ongoing, Progressing     Problem: Adult Inpatient Plan of Care  Goal: Plan of Care Review  Outcome: Ongoing, Progressing  Goal: Patient-Specific Goal (Individualized)  Outcome: Ongoing, Progressing  Goal: Absence of Hospital-Acquired Illness or Injury  Outcome: Ongoing, Progressing  Goal: Optimal Comfort and Wellbeing  Outcome: Ongoing, Progressing  Goal: Readiness for Transition of Care  Outcome: Ongoing, Progressing     Problem: Infection  Goal: Absence of Infection Signs and Symptoms  Outcome: Ongoing, Progressing     Problem: Skin Injury Risk Increased  Goal: Skin Health  and Integrity  Outcome: Ongoing, Progressing

## 2022-08-11 NOTE — PROGRESS NOTES
Pulmonary & Critical Care Medicine       Progress Note      Presenting History/HPI:  46 AAF with no reported PMH presented for sudden-on headache. Patient was watching TV, laying in bed around 12AM when he started experiencing intense pressure/pain in the occipital region radiating to the neck a/w dizziness/N/V for which he took 2 enma's aspirin and heating pack with some improvement, but ultimately decided to come to the ED accompanied by wife due to persistence of HA. No prior episodes. No bowel or bladder dysfunction. In the ED imaging CTH revealed subtle SAH with obstructing hydrocephalus above 4th ventricle. NSGY, Dr. Buchanan consulted by ED physician, recommended CTA head and neck for now and ICU admission for q1h neurochecks.       Interval History:  -no major issues or changes overnight  -status post Neuro Interventional procedure on 08/10 with no intracranial aneurysm found but definitive basilar artery vasospasm seen, status post injection of verapamil, still in the ICU this morning now on Cleviprex at 3 mg due to hypertension.   - Patient complaining of photophobia resulting in significant headache      Scheduled Medications:    levetiracetam IV  500 mg Intravenous Q12H    lisinopriL  10 mg Oral Daily    niMODipine  60 mg Oral Q4H       PRN Medications:   albuterol-ipratropium, hydrALAZINE, labetalol, morphine, ondansetron, sodium chloride 0.9%      Infusions:     sodium chloride 0.9% 125 mL/hr at 08/11/22 0512    clevidipine 3 mg/hr (08/11/22 1018)         Fluid Balance:     Intake/Output Summary (Last 24 hours) at 8/11/2022 1043  Last data filed at 8/11/2022 1010  Gross per 24 hour   Intake 2699.37 ml   Output 4100 ml   Net -1400.63 ml           Vital Signs:   Vitals:    08/11/22 1015   BP: (!) 150/81   Pulse: 61   Resp: 12   Temp:          Physical Exam  Vitals and nursing note reviewed.   Constitutional:       General: He is not in acute distress.     Appearance: Normal appearance. He is not  ill-appearing or toxic-appearing.   HENT:      Head: Normocephalic and atraumatic.      Right Ear: External ear normal.      Left Ear: External ear normal.      Nose: Nose normal.      Mouth/Throat:      Mouth: Mucous membranes are moist.      Pharynx: Oropharynx is clear. No oropharyngeal exudate or posterior oropharyngeal erythema.   Eyes:      General: No scleral icterus.     Extraocular Movements: Extraocular movements intact.      Conjunctiva/sclera: Conjunctivae normal.      Pupils: Pupils are equal, round, and reactive to light.   Neck:      Vascular: No carotid bruit.   Cardiovascular:      Rate and Rhythm: Normal rate and regular rhythm.      Pulses: Normal pulses.      Heart sounds: Normal heart sounds. No murmur heard.    No friction rub. No gallop.   Pulmonary:      Effort: Pulmonary effort is normal. No respiratory distress.      Breath sounds: Normal breath sounds. No wheezing, rhonchi or rales.   Abdominal:      General: Abdomen is flat. Bowel sounds are normal. There is no distension.      Palpations: Abdomen is soft.      Tenderness: There is no abdominal tenderness. There is no guarding or rebound.   Genitourinary:     Comments: deferred  Musculoskeletal:         General: No swelling or deformity. Normal range of motion.      Cervical back: Normal range of motion and neck supple. No rigidity or tenderness.   Lymphadenopathy:      Cervical: No cervical adenopathy.   Skin:     General: Skin is warm and dry.      Capillary Refill: Capillary refill takes less than 2 seconds.      Coloration: Skin is not jaundiced.      Findings: No bruising, lesion or rash.   Neurological:      General: No focal deficit present.      Mental Status: He is alert.      Sensory: No sensory deficit.      Motor: No weakness.   Psychiatric:         Mood and Affect: Mood normal.         Laboratory Studies:   No results for input(s): PH, PCO2, PO2, HCO3, POCSATURATED, BE in the last 24 hours.  Recent Labs   Lab 08/11/22  9977    WBC 7.9   RBC 5.36   HGB 13.8*   HCT 43.6      MCV 81.3   MCH 25.7*   MCHC 31.7*     Recent Labs   Lab 08/11/22  0207   GLUCOSE 145*      K 3.6   CO2 23   BUN 10.1   CREATININE 0.84         Microbiology Data:   Microbiology Results (last 7 days)     ** No results found for the last 168 hours. **            Imaging:   IR Angiogram Carotid Cerebral Bilateral  See OP Notes for results.     IMPRESSION: See OP Notes for results.     This procedure was auto-finalized by: Virtual Radiologist  US Transcran Doppler Intracran Artr Comp  Narrative: EXAMINATION:  US TRANSCRAN DOPPLER INTRACRAN ARTR COMP    CLINICAL HISTORY:  TCDs;    TECHNIQUE:  Multiple transtemporal grayscale sonographic images and Doppler interrogation of the [place] of [place] and associated intracranial branch vessels was performed.    COMPARISON:  CT angiogram of the head neck dated 08/08/2022    FINDINGS:  The exam is nondiagnostic with nonvisualization of the intracranial vessels.  Impression: Nondiagnostic exam with nonvisualization of the vessels.    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  -Hypertensive urgency        Plan:  -continue recommendations per Neurosurgery with Keppra for prophylaxis in addition to nimodipine  -goal systolic pressure at 140 or less, currently on Cleviprex at 3 mg, titrate to goal, will start on lisinopril 10 mg  -status post Neuro Interventional cerebral angiogram with findings of basilar artery vaso spasm status post prolonged administration of verapamil, further plans per Interventional Neurology if there are any more interventional imaging a needs to be performed versus follow-up  transcranial Dopplers    DVT prophylaxis with SCD  NPO for now, small sips of water with medications    The above plan was discussed in depth with the patient and his wife at bedside, all questions were answered        The patient remains at high risk of decompensation and death and will remain in ICU level care    > 34 min critical care time was spent reviewing the patient's chart including medications, radiographs, labs, pertinent cultures and pathology data, other consultant notes/recomendations as well as titration of vasopressors, adjustment of mechanical ventilatory or NIPPV support, as well as discussion of goals of care with nursing staff, respiratory therapy at the bedside and with family at the bedside/via phone.      Serafin Falcon MD  8/11/2022  Pulmonology/Critical Care

## 2022-08-11 NOTE — ASSESSMENT & PLAN NOTE
SAH  No evidence of aneurysm seen during cerebral angiogram (8/10)  Basilar artery vasospasm s/p verapamil injection (8/10)    -ok to remove groin sheath  -Continue triple H therapy   -Continue frequent neuro checks  -continue NS IVF at 125mL/hr, PLEASE do not stop fluids without talking to neurology  -TCD MWF, TCD scheduled for tomorrow   -Continue Nimotop 60mg Q4 hours ... Do NOT skip doses  -Continue Keppra 500mg BID ... seizure precautions     Discussed with Dr Ivory ... planning to repeat cerebral angiogram next week.

## 2022-08-11 NOTE — PROGRESS NOTES
[facility]  Interventional Neurology  Progress Note    Patient Name: Canelo Blank  MRN: [id number]  Admission Date: 8/8/2022  Hospital Length of Stay: 3 days  Code Status: Full Code   Attending Provider: Wes Roger MD  Primary Care Physician: Primary Doctor No   Principal Problem:<principal problem not specified>    HPI:   Canelo Blank is a 46-year-old male with no significant medical history, presented to ED 8/8 with reports of severe HA.  He also complained of neck pain radiating down to his shoulders.  He took BC powder at home, but headache worsened.  Wife reports that he vomited prior to ED arrival.  CTh showed SAH with obstructing hydrocephalus.  CTA h/n showed some irregularity of the fetal originating left posterior cerebral artery off the left internal carotid with 2 small irregular saccular foci.  Initial plan was to transfer patient to a facility in [place] for interventional neurology evaluation, but there were no beds available for them to accept the patient.     Discussed with Dr Ivory, who stated he discussed case with neurosurgery and is planning to take patient to cath lab for embolization later today.       Overview/Hospital Course:  No notes on file        Subjective:     Interval History: Underwent cerebral angiogram yesterday ... no evidence of aneurysm, received verapamil for basilar artery vasospasm.  No new issues overnight.      Current Facility-Administered Medications   Medication Dose Route Frequency Provider Last Rate Last Admin    0.9%  NaCl infusion   Intravenous Continuous Celso Buchanan  mL/hr at 08/11/22 0512 New Bag at 08/11/22 0512    albuterol-ipratropium 2.5 mg-0.5 mg/3 mL nebulizer solution 3 mL  3 mL Nebulization Q1H PRN Rk Vickers Jr., MD        clevidipine (CLEVIPREX) 25 mg/50 mL infusion  0-16 mg/hr Intravenous Continuous Rashaun Jalloh,  4 mL/hr at 08/11/22 1123 2 mg/hr at 08/11/22 1123    hydrALAZINE injection  10 mg  10 mg Intravenous Q2H PRN Jailyn Rodrigues MD   10 mg at 08/10/22 1632    HYDROcodone-acetaminophen 7.5-325 mg per tablet 1 tablet  1 tablet Oral Q6H PRN AMINATA Suero-BC   1 tablet at 08/11/22 1235    labetaloL injection 10 mg  10 mg Intravenous Q2H PRN Jailyn Rodrigues MD   10 mg at 08/10/22 0507    levETIRAcetam injection 500 mg  500 mg Intravenous Q12H Jailyn Rodrigues MD   500 mg at 08/11/22 0803    lisinopriL tablet 10 mg  10 mg Oral Daily Serafin Falcon MD   10 mg at 08/11/22 1010    morphine injection 2 mg  2 mg Intravenous Q4H PRN Jailyn Rodrigues MD   2 mg at 08/11/22 0952    niMODipine capsule 60 mg  60 mg Oral Q4H Serafin Rogers MD   60 mg at 08/11/22 0901    ondansetron injection 8 mg  8 mg Intravenous Q6H PRN Claudia Keys MD   4 mg at 08/10/22 1518    sodium chloride 0.9% flush 10 mL  10 mL Intravenous PRN Jailyn Rodrigues MD           Review of Systems   All other systems reviewed and are negative.    Objective:     Vital Signs (Most Recent):  Temp: 98.4 °F (36.9 °C) (08/11/22 0745)  Pulse: 62 (08/11/22 1245)  Resp: 12 (08/11/22 1245)  BP: 124/62 (08/11/22 1245)  SpO2: 98 % (08/11/22 1245) Vital Signs (24h Range):  Temp:  [98 °F (36.7 °C)-98.4 °F (36.9 °C)] 98.4 °F (36.9 °C)  Pulse:  [] 62  Resp:  [7-30] 12  SpO2:  [96 %-100 %] 98 %  BP: ()/(48-87) 124/62  Arterial Line BP: (160-178)/(65-73) 160/65     Weight: 68.9 kg (151 lb 14.4 oz)  Body mass index is 25.28 kg/m².      Physical Exam  Constitutional:       General: He is not in acute distress.     Appearance: Normal appearance. He is not ill-appearing.   Eyes:      Extraocular Movements: Extraocular movements intact.      Pupils: Pupils are equal, round, and reactive to light.   Cardiovascular:      Rate and Rhythm: Normal rate and regular rhythm.   Pulmonary:      Effort: Pulmonary effort is normal.   Musculoskeletal:         General: Normal range of motion.   Skin:     General: Skin is warm and dry.       Capillary Refill: Capillary refill takes less than 2 seconds.   Neurological:      General: No focal deficit present.      Mental Status: He is alert. Mental status is at baseline.      Comments:   Mental Status: Alert, oriented x 4, follows commands reliably  Language/Speech: speech clear, fluent, coherent  Cranial Nerves:  -CN II, III: PERRLA, no visual field deficit, ptosis absent  -CN III, IV, VI: EOMI, no gaze preference, no nystagmus  -CN V: facial tactile sensation intact symmetrical  -CN VII: no facial asymmetry  -CN VIII: normal hearing to speech  -CN XI: head turning and shoulder shrug intact  -CN XII: tongue protrusion midline  Motor: BUE 5/5, BLE 5/5; no lateralizing or focal deficits noted  Sensory: normal to tactile stimulation  Cerebellar: no tremor or dysmetria  Memory: normal thought process intact  Gait: not observed   Psychiatric:         Mood and Affect: Mood normal.         Behavior: Behavior normal.      Significant Labs: BMP:   Recent Labs   Lab 08/10/22  0203 08/11/22  0207   * 137   K 4.1 3.6   CO2 24 23   BUN 7.7* 10.1   CREATININE 0.90 0.84   CALCIUM 9.4 9.0     CBC:   Recent Labs   Lab 08/10/22  0203 08/11/22  0207   WBC 10.2 7.9   HGB 15.0 13.8*   HCT 48.5 43.6    236       Significant Imaging: I have reviewed all pertinent imaging results/findings within the past 24 hours.    Assessment and Plan:     SAH (subarachnoid hemorrhage)  SAH  No evidence of aneurysm seen during cerebral angiogram (8/10)  Basilar artery vasospasm s/p verapamil injection (8/10)    -ok to remove groin sheath  -Continue triple H therapy   -Continue frequent neuro checks  -continue NS IVF at 125mL/hr, PLEASE do not stop fluids without talking to neurology  -TCD MWF, TCD scheduled for tomorrow   -Continue Nimotop 60mg Q4 hours ... Do NOT skip doses  -Continue Keppra 500mg BID ... seizure precautions     Discussed with Dr Ivory ... planning to repeat cerebral angiogram next week.            Kira BARONE  NE Farrell  Interventional Neurology  [facility]

## 2022-08-11 NOTE — PROGRESS NOTES
Hospital Progress Note  [facility] Neurosurgery    Admit Date: 8/8/2022  Post-operative Day:    Hospital Day: 4    SUBJECTIVE:   Follow-up For:  SAH    Chief Complaint:  Chief Complaint   Patient presents with    Headache       Complaint of new onset headache, with pressure. Vomited x 1 prior to arrival.  Symptoms started at 0030       Interval History:  Patient resting in bed. Reports some eye pain since the angiogram, not worsening or improving. Continues with headaches, unchanged. Receiving morphine for pain, works well but does not last. Family at bedside during rounds.     History of Present Illness:  Patient is a 45yo male with no significant PMHX, who presented to ED this am with c/o pressure to the back of his head that began suddenly around 0030 this am, waking him from his sleep. He reports the pain from his head radiated down to his shoulders. He did take some medication and laid back down. Pt reports he felt better after this, but then the pain to his head soon returned. He reports 3 episodes of vomiting. He denies any previous neck/back sx, vision changes, or any other medical hx. Pt also denies ETOH, smoking, or drugs.      On arrival to the ED a CT head revealed SAH in the prepontine cistern and sylvian fissures with a small volume IVH. Dr. Buchanan was consulted on 8/8/2022 for evaluation and treatment recommendations.    8/10/2022: Patient underwent cerebral angio with Dr. Ivory. No evidence of aneurysm. Verapamil administered for vasospasm of basilar artery    Scheduled Meds:   levetiracetam IV  500 mg Intravenous Q12H    lisinopriL  10 mg Oral Daily    niMODipine  60 mg Oral Q4H     Continuous Infusions:   sodium chloride 0.9% 125 mL/hr at 08/11/22 0512    clevidipine 3 mg/hr (08/11/22 1104)     PRN Meds:albuterol-ipratropium, hydrALAZINE, labetalol, morphine, ondansetron, sodium chloride 0.9%    Review of patient's allergies indicates:   Allergen Reactions    Shellfish  containing products      History reviewed. No pertinent past medical history.  Past Surgical History:   Procedure Laterality Date    APPENDECTOMY      FRACTURE SURGERY Right     pinky finger       ROS:  Review of Systems   Eyes: Positive for pain.   Neurological: Positive for headaches.       OBJECTIVE:     Vital Signs (Most Recent)  Temp: 98.4 °F (36.9 °C) (08/11/22 0745)  Pulse: 84 (08/11/22 1100)  Resp: 15 (08/11/22 1100)  BP: (!) 113/57 (08/11/22 1100)  SpO2: 97 % (08/11/22 1100)    Vital Signs Range (Last 24H):  Temp:  [98 °F (36.7 °C)-98.4 °F (36.9 °C)]   Pulse:  []   Resp:  [7-30]   BP: ()/(48-87)   SpO2:  [96 %-100 %]   Arterial Line BP: (160-178)/(65-73)     I & O (Last 24H):    Intake/Output Summary (Last 24 hours) at 8/11/2022 1110  Last data filed at 8/11/2022 1010  Gross per 24 hour   Intake 2699.37 ml   Output 4100 ml   Net -1400.63 ml     Physical Exam:  General: well developed, well nourished, no distress  Neurologic: GCS: Motor: 6/Verbal: 5/Eyes: 4 GCS Total: 15  Mental Status: Awake, Alert, Oriented x4  Cranial nerves: face symmetric, tongue midline, pupils equal, round, reactive to light with accomodation, extraocular muscles intact  Sensory: intact to light touch and pin prick throughout  Motor Strength:full strength upper and lower extremities  Gait: Not assessed - groin sheath in place  Head: normocephalic, atraumatic  Lungs:  clear to auscultation bilaterally and normal respiratory effort  Heart: regular rate and rhythm  Abdomen: soft, non-tender non-distented; bowel sounds normal  Extremities: warm, well perfused and no cyanosis or edema,    Lines/Drains:       Peripheral IV - Single Lumen 08/08/22 0435 20 G Anterior;Proximal;Right Forearm (Active)   Site Assessment Clean;Dry;Intact 08/11/22 1000   Extremity Assessment Distal to IV No abnormal discoloration;No redness;No warmth;No swelling 08/11/22 1000   Line Status Infusing 08/11/22 1000   Dressing Status Dry;Intact;Clean  "08/11/22 1000   Dressing Intervention Integrity maintained 08/11/22 1000   Number of days: 3            Peripheral IV - Single Lumen 08/08/22 1200 20 G Anterior;Left Forearm (Active)   Site Assessment Clean;Dry;Intact 08/11/22 1000   Extremity Assessment Distal to IV No abnormal discoloration;No redness;No swelling;No warmth 08/11/22 1000   Line Status Saline locked 08/11/22 1000   Dressing Status Clean;Dry;Intact 08/11/22 1000   Dressing Intervention Integrity maintained 08/11/22 1000   Number of days: 2            Urethral Catheter 08/10/22 1454 Silicone 16 Fr. (Active)   Site Assessment Clean;Intact;Dry 08/11/22 0800   Collection Container Standard drainage bag 08/11/22 0800   Securement Method secured to top of thigh w/ adhesive device 08/11/22 0800   Catheter Care Performed yes 08/11/22 0800   Reason for Continuing Urinary Catheterization Critically ill in ICU and requiring hourly monitoring of intake/output 08/11/22 0800   CAUTI Prevention Bundle Securement Device in place with 1" slack;Intact seal between catheter & drainage tubing;Drainage bag/urimeter off the floor;Sheeting clip in use;No dependent loops or kinks;Drainage bag/urimeter not overfilled (<2/3 full);Drainage bag/urimeter below bladder 08/11/22 0800   Output (mL) 700 mL 08/11/22 0200   Number of days: 0            Sheath 08/10/22 1511 Right anterior (Active)   Insertion Site clean and dry;dressing intact;no hematoma 08/11/22 1100   Drainage Amount None 08/11/22 1100   Number of days: 0       Laboratory:  I have reviewed all pertinent lab results within the past 24 hours.  CBC:   Recent Labs   Lab 08/11/22  0207   WBC 7.9   RBC 5.36   HGB 13.8*   HCT 43.6      MCV 81.3   MCH 25.7*   MCHC 31.7*     BMP:   Recent Labs   Lab 08/11/22  0207      K 3.6   CO2 23   BUN 10.1   CREATININE 0.84   CALCIUM 9.0       ASSESSMENT/PLAN:     Problem List Items Addressed This Visit        Neuro    SAH (subarachnoid hemorrhage)    Relevant Orders    " Place sequential compression device      Other Visit Diagnoses     Acute nonintractable headache, unspecified headache type    -  Primary        PLAN  Continue HHH therapy  On Keppra, Nimotop  Neurology following.  There are plans to repeat angiogram in the upcoming weeks  Discussed the above with patient, family, nurse.  Continue close neurological monitoring.    Please call with any decline        JOHN Baeza

## 2022-08-11 NOTE — HOSPITAL COURSE
(8/10/22)  underwent cerebral angiogram ... no evidence of aneurysm, received verapamil for basilar artery vasospasm  (8/17/22) repeat cerebral angiogram ... no evidence of aneurysm or vasospasm

## 2022-08-12 LAB
ANION GAP SERPL CALC-SCNC: 7 MEQ/L
BASOPHILS # BLD AUTO: 0.03 X10(3)/MCL (ref 0–0.2)
BASOPHILS NFR BLD AUTO: 0.3 %
BUN SERPL-MCNC: 10.4 MG/DL (ref 8.9–20.6)
CALCIUM SERPL-MCNC: 8.9 MG/DL (ref 8.4–10.2)
CHLORIDE SERPL-SCNC: 102 MMOL/L (ref 98–107)
CO2 SERPL-SCNC: 26 MMOL/L (ref 22–29)
CREAT SERPL-MCNC: 0.76 MG/DL (ref 0.73–1.18)
CREAT/UREA NIT SERPL: 14
EOSINOPHIL # BLD AUTO: 0.01 X10(3)/MCL (ref 0–0.9)
EOSINOPHIL NFR BLD AUTO: 0.1 %
ERYTHROCYTE [DISTWIDTH] IN BLOOD BY AUTOMATED COUNT: 13.9 % (ref 11.5–17)
GFR SERPLBLD CREATININE-BSD FMLA CKD-EPI: >60 MLS/MIN/1.73/M2
GLUCOSE SERPL-MCNC: 122 MG/DL (ref 74–100)
HCT VFR BLD AUTO: 43.6 % (ref 42–52)
HGB BLD-MCNC: 14.1 GM/DL (ref 14–18)
IMM GRANULOCYTES # BLD AUTO: 0.05 X10(3)/MCL (ref 0–0.04)
IMM GRANULOCYTES NFR BLD AUTO: 0.5 %
LYMPHOCYTES # BLD AUTO: 1.05 X10(3)/MCL (ref 0.6–4.6)
LYMPHOCYTES NFR BLD AUTO: 9.8 %
MCH RBC QN AUTO: 26.2 PG (ref 27–31)
MCHC RBC AUTO-ENTMCNC: 32.3 MG/DL (ref 33–36)
MCV RBC AUTO: 80.9 FL (ref 80–94)
MONOCYTES # BLD AUTO: 0.84 X10(3)/MCL (ref 0.1–1.3)
MONOCYTES NFR BLD AUTO: 7.8 %
NEUTROPHILS # BLD AUTO: 8.7 X10(3)/MCL (ref 2.1–9.2)
NEUTROPHILS NFR BLD AUTO: 81.5 %
NRBC BLD AUTO-RTO: 0 %
PLATELET # BLD AUTO: 225 X10(3)/MCL (ref 130–400)
PMV BLD AUTO: 10.8 FL (ref 7.4–10.4)
POTASSIUM SERPL-SCNC: 3.6 MMOL/L (ref 3.5–5.1)
RBC # BLD AUTO: 5.39 X10(6)/MCL (ref 4.7–6.1)
SODIUM SERPL-SCNC: 135 MMOL/L (ref 136–145)
WBC # SPEC AUTO: 10.7 X10(3)/MCL (ref 4.5–11.5)

## 2022-08-12 PROCEDURE — 63600175 PHARM REV CODE 636 W HCPCS: Performed by: STUDENT IN AN ORGANIZED HEALTH CARE EDUCATION/TRAINING PROGRAM

## 2022-08-12 PROCEDURE — 25000003 PHARM REV CODE 250: Performed by: STUDENT IN AN ORGANIZED HEALTH CARE EDUCATION/TRAINING PROGRAM

## 2022-08-12 PROCEDURE — 63600175 PHARM REV CODE 636 W HCPCS: Mod: JG | Performed by: STUDENT IN AN ORGANIZED HEALTH CARE EDUCATION/TRAINING PROGRAM

## 2022-08-12 PROCEDURE — 25000003 PHARM REV CODE 250: Performed by: INTERNAL MEDICINE

## 2022-08-12 PROCEDURE — 85025 COMPLETE CBC W/AUTO DIFF WBC: CPT | Performed by: STUDENT IN AN ORGANIZED HEALTH CARE EDUCATION/TRAINING PROGRAM

## 2022-08-12 PROCEDURE — 25000003 PHARM REV CODE 250: Performed by: HOSPITALIST

## 2022-08-12 PROCEDURE — 80048 BASIC METABOLIC PNL TOTAL CA: CPT | Performed by: STUDENT IN AN ORGANIZED HEALTH CARE EDUCATION/TRAINING PROGRAM

## 2022-08-12 PROCEDURE — 20000000 HC ICU ROOM

## 2022-08-12 PROCEDURE — C9248 INJ, CLEVIDIPINE BUTYRATE: HCPCS | Mod: JG | Performed by: STUDENT IN AN ORGANIZED HEALTH CARE EDUCATION/TRAINING PROGRAM

## 2022-08-12 PROCEDURE — 36415 COLL VENOUS BLD VENIPUNCTURE: CPT | Performed by: STUDENT IN AN ORGANIZED HEALTH CARE EDUCATION/TRAINING PROGRAM

## 2022-08-12 PROCEDURE — 25000003 PHARM REV CODE 250: Performed by: NEUROLOGICAL SURGERY

## 2022-08-12 PROCEDURE — 25000003 PHARM REV CODE 250: Performed by: NURSE PRACTITIONER

## 2022-08-12 RX ORDER — BUTALBITAL, ACETAMINOPHEN AND CAFFEINE 50; 325; 40 MG/1; MG/1; MG/1
1 TABLET ORAL EVERY 4 HOURS PRN
Status: DISCONTINUED | OUTPATIENT
Start: 2022-08-12 | End: 2022-08-22 | Stop reason: HOSPADM

## 2022-08-12 RX ADMIN — MORPHINE SULFATE 2 MG: 4 INJECTION INTRAVENOUS at 06:08

## 2022-08-12 RX ADMIN — CLEVIPIDINE 6 MG/HR: 0.5 EMULSION INTRAVENOUS at 08:08

## 2022-08-12 RX ADMIN — NIMODIPINE 60 MG: 30 CAPSULE, LIQUID FILLED ORAL at 01:08

## 2022-08-12 RX ADMIN — SODIUM CHLORIDE: 9 INJECTION, SOLUTION INTRAVENOUS at 06:08

## 2022-08-12 RX ADMIN — CLEVIPIDINE 3 MG/HR: 0.5 EMULSION INTRAVENOUS at 06:08

## 2022-08-12 RX ADMIN — MORPHINE SULFATE 2 MG: 4 INJECTION INTRAVENOUS at 09:08

## 2022-08-12 RX ADMIN — NIMODIPINE 60 MG: 30 CAPSULE, LIQUID FILLED ORAL at 10:08

## 2022-08-12 RX ADMIN — CLEVIPIDINE 2 MG/HR: 0.5 EMULSION INTRAVENOUS at 12:08

## 2022-08-12 RX ADMIN — CLEVIPIDINE 2 MG/HR: 0.5 EMULSION INTRAVENOUS at 02:08

## 2022-08-12 RX ADMIN — MORPHINE SULFATE 2 MG: 4 INJECTION INTRAVENOUS at 05:08

## 2022-08-12 RX ADMIN — HYDROCODONE BITARTRATE AND ACETAMINOPHEN 1 TABLET: 7.5; 325 TABLET ORAL at 05:08

## 2022-08-12 RX ADMIN — BUTALBITAL, ACETAMINOPHEN AND CAFFEINE 1 TABLET: 50; 325; 40 TABLET ORAL at 06:08

## 2022-08-12 RX ADMIN — LISINOPRIL 10 MG: 10 TABLET ORAL at 08:08

## 2022-08-12 RX ADMIN — BUTALBITAL, ACETAMINOPHEN AND CAFFEINE 1 TABLET: 50; 325; 40 TABLET ORAL at 09:08

## 2022-08-12 RX ADMIN — MORPHINE SULFATE 2 MG: 4 INJECTION INTRAVENOUS at 12:08

## 2022-08-12 RX ADMIN — MORPHINE SULFATE 2 MG: 4 INJECTION INTRAVENOUS at 01:08

## 2022-08-12 RX ADMIN — NIMODIPINE 60 MG: 30 CAPSULE, LIQUID FILLED ORAL at 05:08

## 2022-08-12 RX ADMIN — LEVETIRACETAM 500 MG: 100 INJECTION, SOLUTION, CONCENTRATE INTRAVENOUS at 08:08

## 2022-08-12 RX ADMIN — SODIUM CHLORIDE: 9 INJECTION, SOLUTION INTRAVENOUS at 03:08

## 2022-08-12 RX ADMIN — NIMODIPINE 60 MG: 30 CAPSULE, LIQUID FILLED ORAL at 02:08

## 2022-08-12 RX ADMIN — CLEVIPIDINE 6 MG/HR: 0.5 EMULSION INTRAVENOUS at 10:08

## 2022-08-12 RX ADMIN — MORPHINE SULFATE 2 MG: 4 INJECTION INTRAVENOUS at 03:08

## 2022-08-12 NOTE — ANESTHESIA POSTPROCEDURE EVALUATION
Anesthesia Post Evaluation    Patient: Canelo Abhay    Procedure(s) Performed: * No procedures listed *    Final Anesthesia Type: general      Patient location during evaluation: floor  Patient participation: Yes- Able to Participate  Level of consciousness: awake and alert  Post-procedure vital signs: reviewed and stable  Pain management: adequate  Airway patency: patent    PONV status at discharge: No PONV  Anesthetic complications: no      Cardiovascular status: blood pressure returned to baseline  Respiratory status: spontaneous ventilation and room air  Hydration status: euvolemic  Follow-up not needed.          Vitals Value Taken Time   /77 08/12/22 0601   Temp 36.7 °C (98 °F) 08/12/22 0400   Pulse 65 08/12/22 0614   Resp 12 08/12/22 0614   SpO2 98 % 08/12/22 0614   Vitals shown include unvalidated device data.      No case tracking events are documented in the log.      Pain/Addy Score: Pain Rating Prior to Med Admin: 7 (8/12/2022  5:53 AM)  Pain Rating Post Med Admin: 5 (8/11/2022  1:28 PM)

## 2022-08-12 NOTE — PROGRESS NOTES
[facility] [address]  Neurosurgery  Progress Note    Subjective:     Interval History: Patient lying in bed, NAD. His eye pain has resolved. His HA is controlled. His exam has been stable.    History of Present Illness: Patient is a 47yo male with no significant PMHX, who presented to ED this am with c/o pressure to the back of his head that began suddenly around 0030 this am, waking him from his sleep. He reports the pain from his head radiated down to his shoulders. He did take some medication and laid back down. Pt reports he felt better after this, but then the pain to his head soon returned. He reports 3 episodes of vomiting. He denies any previous neck/back sx, vision changes, or any other medical hx. Pt also denies ETOH, smoking, or drugs.      On arrival to the ED a CT head revealed SAH in the prepontine cistern and sylvian fissures with a small volume IVH. Dr. Buchanan was consulted on 8/8/2022 for evaluation and treatment recommendations.     8/10/2022: Patient underwent cerebral angio with Dr. Ivory. No evidence of aneurysm. Verapamil administered for vasospasm of basilar artery    8/11/2022: Patient resting in bed. Reports some eye pain since the angiogram, not worsening or improving. Continues with headaches, unchanged. Receiving morphine for pain, works well but does not last. Family at bedside during rounds.    Post-Op Info:  * No surgery found *          Medications:  Continuous Infusions:   sodium chloride 0.9% 125 mL/hr at 08/12/22 0639    clevidipine 2 mg/hr (08/12/22 1008)     Scheduled Meds:   levetiracetam IV  500 mg Intravenous Q12H    lisinopriL  10 mg Oral Daily    niMODipine  60 mg Oral Q4H     PRN Meds:albuterol-ipratropium, hydrALAZINE, HYDROcodone-acetaminophen, labetalol, morphine, ondansetron, sodium chloride 0.9%     Review of Systems  Objective:     Weight: 68.9 kg (151 lb 14.4 oz)  Body mass index is 25.28 kg/m².  Vital Signs (Most Recent):  Temp: 99.2 °F  "(37.3 °C) (08/12/22 0800)  Pulse: 62 (08/12/22 1045)  Resp: 13 (08/12/22 1045)  BP: 117/67 (08/12/22 1045)  SpO2: 99 % (08/12/22 1045) Vital Signs (24h Range):  Temp:  [98 °F (36.7 °C)-99.2 °F (37.3 °C)] 99.2 °F (37.3 °C)  Pulse:  [59-92] 62  Resp:  [9-23] 13  SpO2:  [96 %-100 %] 99 %  BP: (100-152)/(48-91) 117/67     Date 08/12/22 0700 - 08/13/22 0659   Shift 4300-9381 4863-0235 6020-2898 24 Hour Total   INTAKE   Shift Total(mL/kg)       OUTPUT   Urine(mL/kg/hr) 1100   1100   Shift Total(mL/kg) 1100(16)   1100(16)   Weight (kg) 68.9 68.9 68.9 68.9                        Urethral Catheter 08/10/22 1454 Silicone 16 Fr. (Active)   Site Assessment Clean;Intact;Dry 08/12/22 0715   Collection Container Standard drainage bag 08/12/22 0715   Securement Method secured to top of thigh w/ adhesive device 08/12/22 0715   Catheter Care Performed yes 08/12/22 0715   Reason for Continuing Urinary Catheterization Critically ill in ICU and requiring hourly monitoring of intake/output 08/12/22 0715   CAUTI Prevention Bundle Securement Device in place with 1" slack;Intact seal between catheter & drainage tubing;Drainage bag/urimeter off the floor;Sheeting clip in use;No dependent loops or kinks;Drainage bag/urimeter below bladder;Drainage bag/urimeter not overfilled (<2/3 full) 08/12/22 0715   Output (mL) 1750 mL 08/12/22 0200       Neurosurgery Physical Exam  General: well developed, well nourished, no distress  Neurologic: GCS: Motor: 6/Verbal: 5/Eyes: 4 GCS Total: 15  Mental Status: Awake, Alert, Oriented x4  Cranial nerves: face symmetric, tongue midline, pupils equal, round, reactive to light with accomodation, extraocular muscles intact  Sensory: intact to light touch and pin prick throughout  Motor Strength:full strength upper and lower extremities  Gait: Not assessed - groin sheath in place  Head: normocephalic, atraumatic  Lungs:  clear to auscultation bilaterally and normal respiratory effort  Heart: regular rate and " rhythm  Abdomen: soft, non-tender non-distented; bowel sounds normal  Extremities: warm, well perfused and no cyanosis or edema,    Significant Labs:  Recent Labs   Lab 08/11/22 0207 08/12/22 0139    135*   K 3.6 3.6   CO2 23 26   BUN 10.1 10.4   CREATININE 0.84 0.76   CALCIUM 9.0 8.9     Recent Labs   Lab 08/11/22 0207 08/12/22 0139   WBC 7.9 10.7   HGB 13.8* 14.1   HCT 43.6 43.6    225     No results for input(s): LABPT, INR, APTT in the last 48 hours.  Microbiology Results (last 7 days)     ** No results found for the last 168 hours. **        US Transcran Doppler Intracran Artr Comp [[id number]] Resulted: 08/12/22 0754   Order Status: Completed Updated: 08/12/22 0756   Narrative:     EXAMINATION:   US TRANSCRAN DOPPLER INTRACRAN ARTR COMP     CLINICAL HISTORY:   SAH;     TECHNIQUE:   Multiple transtemporal grayscale sonographic images and Doppler interrogation of the [place] of [place] and associated intracranial branch vessels was performed.     COMPARISON:   Ultrasound dated 08/10/2022     FINDINGS:   No focal grayscale abnormality is identified.     Right vasculature:     MCA: 117.9 cm/s     BLAYNE: 62.9 cm/s     PCA: 58 cm/s     Left vasculature:     MCA: 65 cm/s     BLAYNE: 60.1 cm/s     PCA: 61.8 cm/s    Impression:       No vasospasm identified.          Assessment/Plan:     Active Diagnoses:    Diagnosis Date Noted POA    SAH (subarachnoid hemorrhage) [I60.9] 08/10/2022 Yes      Problems Resolved During this Admission:     Continue HHH therapy  On Keppra, Nimotop  TCDs M/W/F. Today's results showed no vasospasm.  Neurology following.  There are plans to repeat angiogram in the upcoming weeks  Continue close neurological monitoring.    Please call with any decline    RACHEL Chen  Neurosurgery  [facility]

## 2022-08-12 NOTE — PROGRESS NOTES
[facility]                       [address] [id number]    PATIENT NAME:       ROBERTO CARLOS CHANCE   YOB: 1975  CSN:                [id number]   MRN:                [id number]  ADMIT DATE:         08/08/2022 04:22:00  PHYSICIAN:          RIAN Spear MD                            PROGRESS NOTE    DATE:      He is a 46-year-old black male patient who presented to the hospital, on   08/08/2022, with severe headache.  CT scan of the head showed a subarachnoid   hemorrhage with some mild obstructing hydrocephalus.  He was taken to the   cardiac cath lab by Dr. Ivory.  No obvious aneurysmal rupture was noted.  He   did receive intravenous verapamil because of basal artery vasospasms.  He is   receiving therapy to prevent basal spasm now and is getting TCDs Monday,   Wednesday, Friday.  That was done today.  Results of that are pending.  He is on   Nimotop and IV fluids.  The goal is to keep his arterial blood pressure less   than 140, and he is on Cleviprex for that.  This morning, he is awake, alert.    Denies headache at this time.  Has had no nausea or vomiting either.    PHYSICAL EXAM:  VITAL SIGNS:  His temperature is 98.3, the blood pressure   currently is 130/76, heart rate is 72 and regular, respiratory rate 11   unlabored.  HEENT:  Unremarkable.    CHEST:  Clear bilaterally.  HEART:  Regular in rhythm.  ABDOMEN:  Bowel sounds are positive.  EXTREMITIES:  Lower extremities without significant edema.  NEUROLOGIC:  The patient is intact.  He is awake, alert, appropriate.  Moves all   4 extremities.    LABORATORY DATA:  From today:  H and H 14 and 43.6, white count 10,700, platelet   count 225.  Sodium 135, potassium 3.6, chloride 102, bicarb 26, BUN 10,   creatinine 0.76, glucose 122.      Note, a transcranial Doppler was done and is currently pending.    IMPRESSION:    1. Hospital day #3 for subarachnoid  hemorrhage without evidence of aneurysm seen   during cerebral angiogram, 08/10/2022.   2. Basal artery vasospasms status post verapamil injection.    PLAN:  Dr. Ivory plans on taking him back one day next week for repeat   cerebral angiogram.  In the meantime, the goal is to keep the blood pressure   less than 140.  He is on Cleviprex for that.  He is continuing on the Nimotop 60   mg q.4 hours.  He is on Keppra 500 b.i.d. for seizure prophylaxis and is   continuing on IV fluids.  Plan will be to continue this for approximately 14   days.        ______________________________  G MD JALEESA Esteban/ALYSON  DD:  08/12/2022  Time:  07:19AM  DT:  08/12/2022  Time:  07:43AM  Job #:  [id number]      PROGRESS NOTE

## 2022-08-13 LAB
ANION GAP SERPL CALC-SCNC: 7 MEQ/L
BASOPHILS # BLD AUTO: 0.02 X10(3)/MCL (ref 0–0.2)
BASOPHILS NFR BLD AUTO: 0.3 %
BUN SERPL-MCNC: 9 MG/DL (ref 8.9–20.6)
CALCIUM SERPL-MCNC: 9.2 MG/DL (ref 8.4–10.2)
CHLORIDE SERPL-SCNC: 102 MMOL/L (ref 98–107)
CO2 SERPL-SCNC: 26 MMOL/L (ref 22–29)
CREAT SERPL-MCNC: 0.72 MG/DL (ref 0.73–1.18)
CREAT/UREA NIT SERPL: 13
EOSINOPHIL # BLD AUTO: 0.02 X10(3)/MCL (ref 0–0.9)
EOSINOPHIL NFR BLD AUTO: 0.3 %
ERYTHROCYTE [DISTWIDTH] IN BLOOD BY AUTOMATED COUNT: 13.5 % (ref 11.5–17)
GFR SERPLBLD CREATININE-BSD FMLA CKD-EPI: >60 MLS/MIN/1.73/M2
GLUCOSE SERPL-MCNC: 115 MG/DL (ref 74–100)
HCT VFR BLD AUTO: 44.5 % (ref 42–52)
HGB BLD-MCNC: 14.4 GM/DL (ref 14–18)
IMM GRANULOCYTES # BLD AUTO: 0.02 X10(3)/MCL (ref 0–0.04)
IMM GRANULOCYTES NFR BLD AUTO: 0.3 %
LYMPHOCYTES # BLD AUTO: 0.89 X10(3)/MCL (ref 0.6–4.6)
LYMPHOCYTES NFR BLD AUTO: 12.3 %
MCH RBC QN AUTO: 25.9 PG (ref 27–31)
MCHC RBC AUTO-ENTMCNC: 32.4 MG/DL (ref 33–36)
MCV RBC AUTO: 80.2 FL (ref 80–94)
MONOCYTES # BLD AUTO: 0.67 X10(3)/MCL (ref 0.1–1.3)
MONOCYTES NFR BLD AUTO: 9.3 %
NEUTROPHILS # BLD AUTO: 5.6 X10(3)/MCL (ref 2.1–9.2)
NEUTROPHILS NFR BLD AUTO: 77.5 %
NRBC BLD AUTO-RTO: 0 %
PLATELET # BLD AUTO: 223 X10(3)/MCL (ref 130–400)
PMV BLD AUTO: 10.9 FL (ref 7.4–10.4)
POTASSIUM SERPL-SCNC: 3.6 MMOL/L (ref 3.5–5.1)
RBC # BLD AUTO: 5.55 X10(6)/MCL (ref 4.7–6.1)
SODIUM SERPL-SCNC: 135 MMOL/L (ref 136–145)
WBC # SPEC AUTO: 7.2 X10(3)/MCL (ref 4.5–11.5)

## 2022-08-13 PROCEDURE — C9248 INJ, CLEVIDIPINE BUTYRATE: HCPCS | Mod: JG | Performed by: STUDENT IN AN ORGANIZED HEALTH CARE EDUCATION/TRAINING PROGRAM

## 2022-08-13 PROCEDURE — 25000003 PHARM REV CODE 250: Performed by: INTERNAL MEDICINE

## 2022-08-13 PROCEDURE — 25000003 PHARM REV CODE 250: Performed by: STUDENT IN AN ORGANIZED HEALTH CARE EDUCATION/TRAINING PROGRAM

## 2022-08-13 PROCEDURE — 63600175 PHARM REV CODE 636 W HCPCS: Mod: JG | Performed by: STUDENT IN AN ORGANIZED HEALTH CARE EDUCATION/TRAINING PROGRAM

## 2022-08-13 PROCEDURE — 85025 COMPLETE CBC W/AUTO DIFF WBC: CPT | Performed by: STUDENT IN AN ORGANIZED HEALTH CARE EDUCATION/TRAINING PROGRAM

## 2022-08-13 PROCEDURE — 63600175 PHARM REV CODE 636 W HCPCS: Performed by: STUDENT IN AN ORGANIZED HEALTH CARE EDUCATION/TRAINING PROGRAM

## 2022-08-13 PROCEDURE — 80048 BASIC METABOLIC PNL TOTAL CA: CPT | Performed by: STUDENT IN AN ORGANIZED HEALTH CARE EDUCATION/TRAINING PROGRAM

## 2022-08-13 PROCEDURE — 20000000 HC ICU ROOM

## 2022-08-13 PROCEDURE — 36415 COLL VENOUS BLD VENIPUNCTURE: CPT | Performed by: STUDENT IN AN ORGANIZED HEALTH CARE EDUCATION/TRAINING PROGRAM

## 2022-08-13 PROCEDURE — 25000003 PHARM REV CODE 250: Performed by: HOSPITALIST

## 2022-08-13 PROCEDURE — 25000003 PHARM REV CODE 250: Performed by: NEUROLOGICAL SURGERY

## 2022-08-13 RX ADMIN — CLEVIPIDINE 8 MG/HR: 0.5 EMULSION INTRAVENOUS at 05:08

## 2022-08-13 RX ADMIN — NIMODIPINE 60 MG: 30 CAPSULE, LIQUID FILLED ORAL at 09:08

## 2022-08-13 RX ADMIN — MORPHINE SULFATE 2 MG: 4 INJECTION INTRAVENOUS at 04:08

## 2022-08-13 RX ADMIN — MORPHINE SULFATE 2 MG: 4 INJECTION INTRAVENOUS at 05:08

## 2022-08-13 RX ADMIN — NIMODIPINE 60 MG: 30 CAPSULE, LIQUID FILLED ORAL at 02:08

## 2022-08-13 RX ADMIN — CLEVIPIDINE 6 MG/HR: 0.5 EMULSION INTRAVENOUS at 12:08

## 2022-08-13 RX ADMIN — MORPHINE SULFATE 2 MG: 4 INJECTION INTRAVENOUS at 12:08

## 2022-08-13 RX ADMIN — NIMODIPINE 60 MG: 30 CAPSULE, LIQUID FILLED ORAL at 05:08

## 2022-08-13 RX ADMIN — BUTALBITAL, ACETAMINOPHEN AND CAFFEINE 1 TABLET: 50; 325; 40 TABLET ORAL at 04:08

## 2022-08-13 RX ADMIN — BUTALBITAL, ACETAMINOPHEN AND CAFFEINE 1 TABLET: 50; 325; 40 TABLET ORAL at 09:08

## 2022-08-13 RX ADMIN — BUTALBITAL, ACETAMINOPHEN AND CAFFEINE 1 TABLET: 50; 325; 40 TABLET ORAL at 08:08

## 2022-08-13 RX ADMIN — MORPHINE SULFATE 2 MG: 4 INJECTION INTRAVENOUS at 02:08

## 2022-08-13 RX ADMIN — NIMODIPINE 60 MG: 30 CAPSULE, LIQUID FILLED ORAL at 01:08

## 2022-08-13 RX ADMIN — LEVETIRACETAM 500 MG: 100 INJECTION, SOLUTION, CONCENTRATE INTRAVENOUS at 08:08

## 2022-08-13 RX ADMIN — HYDRALAZINE HYDROCHLORIDE 10 MG: 20 INJECTION INTRAMUSCULAR; INTRAVENOUS at 08:08

## 2022-08-13 RX ADMIN — LISINOPRIL 10 MG: 10 TABLET ORAL at 08:08

## 2022-08-13 RX ADMIN — SODIUM CHLORIDE: 9 INJECTION, SOLUTION INTRAVENOUS at 08:08

## 2022-08-13 RX ADMIN — SODIUM CHLORIDE: 9 INJECTION, SOLUTION INTRAVENOUS at 04:08

## 2022-08-13 NOTE — PROGRESS NOTES
"Nutrition   Progress Note      Recommendations:  1. Continue regular diet as tolerated  2. RD to monitor po intake and weight changes      Reason for Evaluation:  Length of Stay    Diagnosis:    1. Acute nonintractable headache, unspecified headache type    2. SAH (subarachnoid hemorrhage)        Relevant Medical History:  History reviewed. No pertinent past medical history.      Nutrition Diet History:    Factors affecting nutritional intake: none identified at this time    Food / Rastafarian / Culture Preferences:  n/a      Nutrition Prescription Ordered:    Current Diet Order: regular    Appetite:  Good (> 75% - 100% po intake)    PO intake: 75 - 100 %      Labs / Medications / Procedures:    Nutrition Related Medications: NaCl, zofran PRN    Nutrition Related Labs:  8/13: na-135, creat-0.72, glu-115      Anthropometrics:  Height: 5' 5" (1.651 m)  Admit Weight:  Weight: 68.9 kg (151 lb 14.4 oz)  Latest Weight:  68.9 kg (151 lb 14.4 oz)    Wt Readings from Last 5 Encounters:   08/08/22 68.9 kg (151 lb 14.4 oz)     IBW: 61.8 kg  %IBW: 111.5%  UBW: 71.8 kg  %Weight Change: 4%  Body mass index is 25.28 kg/m².  BMI classification:  Overweight (BMI 25 - 29.9)      Nutrition Narrative:  8/13: pt reports good appetite with no n/v/d/c. UBW reported 71.8 kg (158 lb) with suspected weight loss, however unsure of when weight loss occurred. No previous weights in EMR; per pt reported UBW, pt with possible 4% weight loss in unknown time frame. Will continue to monitor.    Monitoring and Evaluation:    Nutrition Monitoring and Evaluation:  food and beverage intake and weight change    Nutrition Risk:  Level of Nutrition Risk:  Low  Frequency of Follow up:  Dietitian will f/up within 7 days.      Lizy Bunn, Registration Eligible, Provisional LDN          "

## 2022-08-13 NOTE — PROGRESS NOTES
[facility]  Pulmonary Critical Care Note    Patient Name: Canelo Blank  MRN: [id number]  Admission Date: 8/8/2022  Hospital Length of Stay: 5 days  Code Status: Full Code  Attending Provider: Wes Roger MD  Primary Care Provider: Primary Doctor No     Subjective:     HPI:    This is a 46-year-old black male patient who presented to the hospital, on 08/08/2022, with severe headache.  CT scan of the head showed a subarachnoid hemorrhage with some mild obstructing hydrocephalus. He was taken to the cardiac cath lab by Dr. Ivory.  No obvious aneurysmal rupture was noted.  He did receive intravenous verapamil because of basal artery vasospasms.  He is receiving therapy to prevent basal spasm now and is getting TCDs Monday, Wednesday, Friday. TCD from 8/1222 was with no evidence of Vasospasm.  He is on Nimotop and IV fluids.  The goal is to keep his arterial blood pressure less than 140, and he is on Cleviprex for that.    This morning, he is awake, alert.  Denies headache at this time.  Has had no nausea or vomiting either.    Hospital Course/Significant events:      24 Hour Interval History:      History reviewed. No pertinent past medical history.    Past Surgical History:   Procedure Laterality Date    APPENDECTOMY      FRACTURE SURGERY Right     pinky finger       Social History     Socioeconomic History    Marital status:    Tobacco Use    Smoking status: Never Smoker   Substance and Sexual Activity    Alcohol use: Never    Drug use: Never    Sexual activity: Yes       No current outpatient medications    Current Inpatient Medications   levetiracetam IV  500 mg Intravenous Q12H    lisinopriL  10 mg Oral Daily    niMODipine  60 mg Oral Q4H       Current Intravenous Infusions   sodium chloride 0.9% 125 mL/hr at 08/13/22 0807    clevidipine Stopped (08/13/22 1048)       Review of Systems   Constitutional: Negative.    HENT: Negative.    Cardiovascular: Negative.     Gastrointestinal: Negative.    Genitourinary: Negative.    Neurological: Positive for headaches.          Objective:       Intake/Output Summary (Last 24 hours) at 8/13/2022 1058  Last data filed at 8/13/2022 1000  Gross per 24 hour   Intake 5717 ml   Output 5275 ml   Net 442 ml         Vital Signs (Most Recent):  Temp: 98.4 °F (36.9 °C) (08/13/22 0800)  Pulse: 89 (08/13/22 1045)  Resp: (!) 31 (08/13/22 1045)  BP: (!) 90/51 (08/13/22 1045)  SpO2: 99 % (08/13/22 1045)  Body mass index is 25.28 kg/m².  Weight: 68.9 kg (151 lb 14.4 oz) Vital Signs (24h Range):  Temp:  [97.7 °F (36.5 °C)-99.4 °F (37.4 °C)] 98.4 °F (36.9 °C)  Pulse:  [61-91] 89  Resp:  [0-31] 31  SpO2:  [95 %-100 %] 99 %  BP: ()/() 90/51     Physical Exam  Constitutional:       Appearance: Normal appearance.   HENT:      Head: Normocephalic and atraumatic.   Cardiovascular:      Rate and Rhythm: Normal rate and regular rhythm.      Heart sounds: Normal heart sounds.   Pulmonary:      Breath sounds: Normal breath sounds.   Abdominal:      General: Bowel sounds are normal.      Palpations: Abdomen is soft.   Musculoskeletal:         General: Normal range of motion.   Neurological:      Mental Status: He is alert and oriented to person, place, and time.   Psychiatric:         Mood and Affect: Mood normal.         Behavior: Behavior normal.         Lines/Drains/Airways     Drain  Duration                Urethral Catheter 08/10/22 1454 Silicone 16 Fr. 2 days          Peripheral Intravenous Line  Duration                Peripheral IV - Single Lumen 08/08/22 0435 20 G Anterior;Proximal;Right Forearm 5 days         Peripheral IV - Single Lumen 08/08/22 1200 20 G Anterior;Left Forearm 4 days                Significant Labs:    Lab Results   Component Value Date    WBC 7.2 08/13/2022    HGB 14.4 08/13/2022    HCT 44.5 08/13/2022    MCV 80.2 08/13/2022     08/13/2022       BMP  Lab Results   Component Value Date     (L) 08/13/2022    K  3.6 08/13/2022    CO2 26 08/13/2022    BUN 9.0 08/13/2022    CREATININE 0.72 (L) 08/13/2022    CALCIUM 9.2 08/13/2022       ABG  No results for input(s): PH, PO2, PCO2, HCO3, BE in the last 168 hours.    Significant Imaging:  US Transcran Doppler Intracran Artr Comp  Narrative: EXAMINATION:  US TRANSCRAN DOPPLER INTRACRAN ARTR COMP    CLINICAL HISTORY:  SAH;    TECHNIQUE:  Multiple transtemporal grayscale sonographic images and Doppler interrogation of the [place] of [place] and associated intracranial branch vessels was performed.    COMPARISON:  Ultrasound dated 08/10/2022    FINDINGS:  No focal grayscale abnormality is identified.    Right vasculature:    MCA: 117.9 cm/s    BLAYNE: 62.9 cm/s    PCA: 58 cm/s    Left vasculature:    MCA: 65 cm/s    BLAYNE: 60.1 cm/s    PCA: 61.8 cm/s  Impression: No vasospasm identified.    Electronically signed by: Nithya Mason  Date:    08/12/2022  Time:    07:54        Assessment/Plan:     Assessment  1. SAH without evidence of aneurysm noted on cerebral angio   2. Basal artery vasospasms s/p verapamil injection     Plan  -Goal is to keep SBP less than 140. On Lisinopril occasionally requiring cleviprex; currently off will continue to monitor and adjust lisinopril as required.   -Continue Nimotop, Keppra, and IVF under the direction of Neurosurgery   -Per noted plans to keep in ICU the full 14 days       Paola Acosta, ANP  Pulmonary Critical Care Medicine  [facility]

## 2022-08-13 NOTE — PROGRESS NOTES
[facility] [address]  Neurosurgery  Progress Note    Subjective:     Interval History: Patient sitting up in his chair, NAD. His eye pain has resolved. He continues with HA, controlled with Fioricet. He denies blurred vision. He is tolerating PO. He is doing much better overall since neuro checks were decreased.    History of Present Illness: Patient is a 47yo male with no significant PMHX, who presented to ED this am with c/o pressure to the back of his head that began suddenly around 0030 this am, waking him from his sleep. He reports the pain from his head radiated down to his shoulders. He did take some medication and laid back down. Pt reports he felt better after this, but then the pain to his head soon returned. He reports 3 episodes of vomiting. He denies any previous neck/back sx, vision changes, or any other medical hx. Pt also denies ETOH, smoking, or drugs.      On arrival to the ED a CT head revealed SAH in the prepontine cistern and sylvian fissures with a small volume IVH. Dr. Buchanan was consulted on 8/8/2022 for evaluation and treatment recommendations.     8/10/2022: Patient underwent cerebral angio with Dr. Ivory. No evidence of aneurysm. Verapamil administered for vasospasm of basilar artery    8/11/2022: Patient resting in bed. Reports some eye pain since the angiogram, not worsening or improving. Continues with headaches, unchanged. Receiving morphine for pain, works well but does not last. Family at bedside during rounds.    8/12/2022: Patient lying in bed, NAD. His eye pain has resolved. His HA is controlled. His exam has been stable.    Post-Op Info:  * No surgery found *          Medications:  Continuous Infusions:   sodium chloride 0.9% 125 mL/hr at 08/13/22 0807    clevidipine Stopped (08/13/22 1048)     Scheduled Meds:   levetiracetam IV  500 mg Intravenous Q12H    lisinopriL  10 mg Oral Daily    niMODipine  60 mg Oral Q4H     PRN Meds:albuterol-ipratropium,  "butalbital-acetaminophen-caffeine -40 mg, hydrALAZINE, HYDROcodone-acetaminophen, labetalol, morphine, ondansetron, sodium chloride 0.9%     Review of Systems  Objective:     Weight: 68.9 kg (151 lb 14.4 oz)  Body mass index is 25.28 kg/m².  Vital Signs (Most Recent):  Temp: 98.4 °F (36.9 °C) (08/13/22 0800)  Pulse: 69 (08/13/22 1100)  Resp: 18 (08/13/22 1100)  BP: (!) 103/53 (08/13/22 1100)  SpO2: 99 % (08/13/22 1100) Vital Signs (24h Range):  Temp:  [97.7 °F (36.5 °C)-99.4 °F (37.4 °C)] 98.4 °F (36.9 °C)  Pulse:  [61-91] 69  Resp:  [0-31] 18  SpO2:  [95 %-100 %] 99 %  BP: ()/() 103/53     Date 08/13/22 0700 - 08/14/22 0659   Shift 0468-8446 9400-0093 0912-6194 24 Hour Total   INTAKE   Shift Total(mL/kg)       OUTPUT   Urine(mL/kg/hr) 700   700   Shift Total(mL/kg) 700(10.2)   700(10.2)   Weight (kg) 68.9 68.9 68.9 68.9                        Urethral Catheter 08/10/22 1454 Silicone 16 Fr. (Active)   Site Assessment Clean;Intact;Dry 08/12/22 0715   Collection Container Standard drainage bag 08/12/22 0715   Securement Method secured to top of thigh w/ adhesive device 08/12/22 0715   Catheter Care Performed yes 08/12/22 0715   Reason for Continuing Urinary Catheterization Critically ill in ICU and requiring hourly monitoring of intake/output 08/12/22 0715   CAUTI Prevention Bundle Securement Device in place with 1" slack;Intact seal between catheter & drainage tubing;Drainage bag/urimeter off the floor;Sheeting clip in use;No dependent loops or kinks;Drainage bag/urimeter below bladder;Drainage bag/urimeter not overfilled (<2/3 full) 08/12/22 0715   Output (mL) 1750 mL 08/12/22 0200       Neurosurgery Physical Exam  General: well developed, well nourished, no distress  Neurologic: GCS: Motor: 6/Verbal: 5/Eyes: 4 GCS Total: 15  Mental Status: Awake, Alert, Oriented x4  Cranial nerves: face symmetric, tongue midline, pupils equal, round, reactive to light with accomodation, extraocular muscles " intact  Sensory: intact to light touch and pin prick throughout  Motor Strength:full strength upper and lower extremities  Gait: Not assessed - groin sheath in place  Head: normocephalic, atraumatic  Lungs:  clear to auscultation bilaterally and normal respiratory effort  Heart: regular rate and rhythm  Abdomen: soft, non-tender non-distented; bowel sounds normal  Extremities: warm, well perfused and no cyanosis or edema,    Significant Labs:  Recent Labs   Lab 08/12/22 0139 08/13/22 0131   * 135*   K 3.6 3.6   CO2 26 26   BUN 10.4 9.0   CREATININE 0.76 0.72*   CALCIUM 8.9 9.2     Recent Labs   Lab 08/12/22 0139 08/13/22 0131   WBC 10.7 7.2   HGB 14.1 14.4   HCT 43.6 44.5    223     No results for input(s): LABPT, INR, APTT in the last 48 hours.  Microbiology Results (last 7 days)     ** No results found for the last 168 hours. **          Assessment/Plan:     Active Diagnoses:    Diagnosis Date Noted POA    SAH (subarachnoid hemorrhage) [I60.9] 08/10/2022 Yes      Problems Resolved During this Admission:     Continue HHH therapy  On Keppra, Nimotop  TCDs M/W/F.   Neurology following.  There are plans to repeat angiogram in the upcoming weeks  Continue close neurological monitoring.    Please call with any decline    RACHEL Chen  Neurosurgery  [facility]

## 2022-08-14 LAB
ANION GAP SERPL CALC-SCNC: 8 MEQ/L
BASOPHILS # BLD AUTO: 0.06 X10(3)/MCL (ref 0–0.2)
BASOPHILS NFR BLD AUTO: 0.8 %
BUN SERPL-MCNC: 10.3 MG/DL (ref 8.9–20.6)
CALCIUM SERPL-MCNC: 9 MG/DL (ref 8.4–10.2)
CHLORIDE SERPL-SCNC: 104 MMOL/L (ref 98–107)
CO2 SERPL-SCNC: 26 MMOL/L (ref 22–29)
CREAT SERPL-MCNC: 0.84 MG/DL (ref 0.73–1.18)
CREAT/UREA NIT SERPL: 12
EOSINOPHIL # BLD AUTO: 0.22 X10(3)/MCL (ref 0–0.9)
EOSINOPHIL NFR BLD AUTO: 2.9 %
ERYTHROCYTE [DISTWIDTH] IN BLOOD BY AUTOMATED COUNT: 13.5 % (ref 11.5–17)
GFR SERPLBLD CREATININE-BSD FMLA CKD-EPI: >60 MLS/MIN/1.73/M2
GLUCOSE SERPL-MCNC: 93 MG/DL (ref 74–100)
HCT VFR BLD AUTO: 43.3 % (ref 42–52)
HGB BLD-MCNC: 13.9 GM/DL (ref 14–18)
IMM GRANULOCYTES # BLD AUTO: 0.03 X10(3)/MCL (ref 0–0.04)
IMM GRANULOCYTES NFR BLD AUTO: 0.4 %
LYMPHOCYTES # BLD AUTO: 1.64 X10(3)/MCL (ref 0.6–4.6)
LYMPHOCYTES NFR BLD AUTO: 21.8 %
MCH RBC QN AUTO: 26.2 PG (ref 27–31)
MCHC RBC AUTO-ENTMCNC: 32.1 MG/DL (ref 33–36)
MCV RBC AUTO: 81.7 FL (ref 80–94)
MONOCYTES # BLD AUTO: 0.7 X10(3)/MCL (ref 0.1–1.3)
MONOCYTES NFR BLD AUTO: 9.3 %
NEUTROPHILS # BLD AUTO: 4.9 X10(3)/MCL (ref 2.1–9.2)
NEUTROPHILS NFR BLD AUTO: 64.8 %
NRBC BLD AUTO-RTO: 0 %
PLATELET # BLD AUTO: 355 X10(3)/MCL (ref 130–400)
PMV BLD AUTO: 11.6 FL (ref 7.4–10.4)
POTASSIUM SERPL-SCNC: 3.3 MMOL/L (ref 3.5–5.1)
RBC # BLD AUTO: 5.3 X10(6)/MCL (ref 4.7–6.1)
SODIUM SERPL-SCNC: 138 MMOL/L (ref 136–145)
WBC # SPEC AUTO: 7.5 X10(3)/MCL (ref 4.5–11.5)

## 2022-08-14 PROCEDURE — 25000003 PHARM REV CODE 250: Performed by: INTERNAL MEDICINE

## 2022-08-14 PROCEDURE — 25000003 PHARM REV CODE 250: Performed by: HOSPITALIST

## 2022-08-14 PROCEDURE — 25000003 PHARM REV CODE 250: Performed by: STUDENT IN AN ORGANIZED HEALTH CARE EDUCATION/TRAINING PROGRAM

## 2022-08-14 PROCEDURE — 80048 BASIC METABOLIC PNL TOTAL CA: CPT | Performed by: STUDENT IN AN ORGANIZED HEALTH CARE EDUCATION/TRAINING PROGRAM

## 2022-08-14 PROCEDURE — 36415 COLL VENOUS BLD VENIPUNCTURE: CPT | Performed by: STUDENT IN AN ORGANIZED HEALTH CARE EDUCATION/TRAINING PROGRAM

## 2022-08-14 PROCEDURE — 20000000 HC ICU ROOM

## 2022-08-14 PROCEDURE — 85025 COMPLETE CBC W/AUTO DIFF WBC: CPT | Performed by: STUDENT IN AN ORGANIZED HEALTH CARE EDUCATION/TRAINING PROGRAM

## 2022-08-14 PROCEDURE — 63600175 PHARM REV CODE 636 W HCPCS: Performed by: STUDENT IN AN ORGANIZED HEALTH CARE EDUCATION/TRAINING PROGRAM

## 2022-08-14 PROCEDURE — 25000003 PHARM REV CODE 250: Performed by: NEUROLOGICAL SURGERY

## 2022-08-14 RX ADMIN — BUTALBITAL, ACETAMINOPHEN AND CAFFEINE 1 TABLET: 50; 325; 40 TABLET ORAL at 10:08

## 2022-08-14 RX ADMIN — SODIUM CHLORIDE: 9 INJECTION, SOLUTION INTRAVENOUS at 12:08

## 2022-08-14 RX ADMIN — LEVETIRACETAM 500 MG: 100 INJECTION, SOLUTION, CONCENTRATE INTRAVENOUS at 08:08

## 2022-08-14 RX ADMIN — MORPHINE SULFATE 2 MG: 4 INJECTION INTRAVENOUS at 03:08

## 2022-08-14 RX ADMIN — MORPHINE SULFATE 2 MG: 4 INJECTION INTRAVENOUS at 08:08

## 2022-08-14 RX ADMIN — LISINOPRIL 10 MG: 10 TABLET ORAL at 08:08

## 2022-08-14 RX ADMIN — HYDRALAZINE HYDROCHLORIDE 10 MG: 20 INJECTION INTRAMUSCULAR; INTRAVENOUS at 06:08

## 2022-08-14 RX ADMIN — BUTALBITAL, ACETAMINOPHEN AND CAFFEINE 1 TABLET: 50; 325; 40 TABLET ORAL at 05:08

## 2022-08-14 RX ADMIN — POTASSIUM BICARBONATE 50 MEQ: 977.5 TABLET, EFFERVESCENT ORAL at 09:08

## 2022-08-14 RX ADMIN — SODIUM CHLORIDE: 9 INJECTION, SOLUTION INTRAVENOUS at 08:08

## 2022-08-14 RX ADMIN — NIMODIPINE 60 MG: 30 CAPSULE, LIQUID FILLED ORAL at 10:08

## 2022-08-14 RX ADMIN — NIMODIPINE 60 MG: 30 CAPSULE, LIQUID FILLED ORAL at 01:08

## 2022-08-14 RX ADMIN — NIMODIPINE 60 MG: 30 CAPSULE, LIQUID FILLED ORAL at 05:08

## 2022-08-14 RX ADMIN — MORPHINE SULFATE 2 MG: 4 INJECTION INTRAVENOUS at 04:08

## 2022-08-14 RX ADMIN — MORPHINE SULFATE 2 MG: 4 INJECTION INTRAVENOUS at 12:08

## 2022-08-14 RX ADMIN — BUTALBITAL, ACETAMINOPHEN AND CAFFEINE 1 TABLET: 50; 325; 40 TABLET ORAL at 01:08

## 2022-08-14 NOTE — PROGRESS NOTES
[facility] [address]  Neurosurgery  Progress Note    Subjective:     Interval History: Patient lying in bed, NAD. His HA has improved. He denies pain behind his eyes, no blurred vision.    History of Present Illness: Patient is a 47yo male with no significant PMHX, who presented to ED this am with c/o pressure to the back of his head that began suddenly around 0030 this am, waking him from his sleep. He reports the pain from his head radiated down to his shoulders. He did take some medication and laid back down. Pt reports he felt better after this, but then the pain to his head soon returned. He reports 3 episodes of vomiting. He denies any previous neck/back sx, vision changes, or any other medical hx. Pt also denies ETOH, smoking, or drugs.      On arrival to the ED a CT head revealed SAH in the prepontine cistern and sylvian fissures with a small volume IVH. Dr. Buchanan was consulted on 8/8/2022 for evaluation and treatment recommendations.     8/10/2022: Patient underwent cerebral angio with Dr. Ivory. No evidence of aneurysm. Verapamil administered for vasospasm of basilar artery    8/11/2022: Patient resting in bed. Reports some eye pain since the angiogram, not worsening or improving. Continues with headaches, unchanged. Receiving morphine for pain, works well but does not last. Family at bedside during rounds.    8/12/2022: Patient lying in bed, NAD. His eye pain has resolved. His HA is controlled. His exam has been stable.    8/13/2022: Patient sitting up in his chair, NAD. His eye pain has resolved. He continues with HA, controlled with Fioricet. He denies blurred vision. He is tolerating PO. He is doing much better overall since neuro checks were decreased.    Post-Op Info:  * No surgery found *          Medications:  Continuous Infusions:   sodium chloride 0.9% 125 mL/hr at 08/14/22 0804    clevidipine Stopped (08/13/22 1829)     Scheduled Meds:   levetiracetam IV  500 mg  "Intravenous Q12H    lisinopriL  10 mg Oral Daily    niMODipine  60 mg Oral Q4H     PRN Meds:albuterol-ipratropium, butalbital-acetaminophen-caffeine -40 mg, hydrALAZINE, HYDROcodone-acetaminophen, labetalol, morphine, ondansetron, sodium chloride 0.9%     Review of Systems  Objective:     Weight: 68.9 kg (151 lb 14.4 oz)  Body mass index is 25.28 kg/m².  Vital Signs (Most Recent):  Temp: 97.8 °F (36.6 °C) (08/14/22 0800)  Pulse: 63 (08/14/22 1039)  Resp: 20 (08/14/22 1039)  BP: 139/88 (08/14/22 1039)  SpO2: 97 % (08/14/22 1039) Vital Signs (24h Range):  Temp:  [97.6 °F (36.4 °C)-99.6 °F (37.6 °C)] 97.8 °F (36.6 °C)  Pulse:  [55-86] 63  Resp:  [10-28] 20  SpO2:  [95 %-100 %] 97 %  BP: ()/() 139/88     Date 08/14/22 0700 - 08/15/22 0659   Shift 9145-6103 9405-7048 7451-7206 24 Hour Total   INTAKE   Shift Total(mL/kg)       OUTPUT   Urine(mL/kg/hr) 750   750   Shift Total(mL/kg) 750(10.9)   750(10.9)   Weight (kg) 68.9 68.9 68.9 68.9                        Urethral Catheter 08/10/22 1454 Silicone 16 Fr. (Active)   Site Assessment Clean;Intact;Dry 08/12/22 0715   Collection Container Standard drainage bag 08/12/22 0715   Securement Method secured to top of thigh w/ adhesive device 08/12/22 0715   Catheter Care Performed yes 08/12/22 0715   Reason for Continuing Urinary Catheterization Critically ill in ICU and requiring hourly monitoring of intake/output 08/12/22 0715   CAUTI Prevention Bundle Securement Device in place with 1" slack;Intact seal between catheter & drainage tubing;Drainage bag/urimeter off the floor;Sheeting clip in use;No dependent loops or kinks;Drainage bag/urimeter below bladder;Drainage bag/urimeter not overfilled (<2/3 full) 08/12/22 0715   Output (mL) 1750 mL 08/12/22 0200       Neurosurgery Physical Exam  General: well developed, well nourished, no distress  Neurologic: GCS: Motor: 6/Verbal: 5/Eyes: 4 GCS Total: 15  Mental Status: Awake, Alert, Oriented x4  Cranial nerves: " face symmetric, tongue midline, pupils equal, round, reactive to light with accomodation, extraocular muscles intact  Sensory: intact to light touch and pin prick throughout  Motor Strength:full strength upper and lower extremities  Gait: Not assessed - groin sheath in place  Head: normocephalic, atraumatic  Lungs:  clear to auscultation bilaterally and normal respiratory effort  Heart: regular rate and rhythm  Abdomen: soft, non-tender non-distented; bowel sounds normal  Extremities: warm, well perfused and no cyanosis or edema,    Significant Labs:  Recent Labs   Lab 08/13/22 0131 08/14/22 0159   * 138   K 3.6 3.3*   CO2 26 26   BUN 9.0 10.3   CREATININE 0.72* 0.84   CALCIUM 9.2 9.0     Recent Labs   Lab 08/13/22 0131 08/14/22 0159   WBC 7.2 7.5   HGB 14.4 13.9*   HCT 44.5 43.3    355     No results for input(s): LABPT, INR, APTT in the last 48 hours.  Microbiology Results (last 7 days)     ** No results found for the last 168 hours. **          Assessment/Plan:     Active Diagnoses:    Diagnosis Date Noted POA    SAH (subarachnoid hemorrhage) [I60.9] 08/10/2022 Yes      Problems Resolved During this Admission:     Continue HHH therapy  On Keppra, Nimotop  TCDs M/W/F.   Neurology following.  There are plans to repeat angiogram in the upcoming weeks  Continue close neurological monitoring.    Please call with any decline    RACHEL Chen  Neurosurgery  [facility]

## 2022-08-14 NOTE — PROGRESS NOTES
[facility]  Pulmonary Critical Care Note    Patient Name: Canelo Blank  MRN: [id number]  Admission Date: 8/8/2022  Hospital Length of Stay: 6 days  Code Status: Full Code  Attending Provider: Wes Roger MD  Primary Care Provider: Primary Doctor No     Subjective:     HPI:    This is a 46-year-old black male patient who presented to the hospital, on 08/08/2022, with severe headache.  CT scan of the head showed a subarachnoid hemorrhage with some mild obstructing hydrocephalus. He was taken to the cardiac cath lab by Dr. Ivory.  No obvious aneurysmal rupture was noted.  He did receive intravenous verapamil because of basal artery vasospasms.  He is receiving therapy to prevent basal spasm now and is getting TCDs Monday, Wednesday, Friday. TCD from 8/1222 was with no evidence of Vasospasm.  He is on Nimotop and IV fluids.  The goal is to keep his arterial blood pressure less than 140, and he was on Cleviprex for that. Has been weaned off cleviprex.      Hospital Course/Significant events:  As above    24 Hour Interval History:  Weaned off of cleviprex yesterday. BP controlled. Has occasional headache relieved with Fiorecet. Denies vision change, weakness, numbness. Denies SOB, CP, BLE pain. He has a good appetite.    History reviewed. No pertinent past medical history.    Past Surgical History:   Procedure Laterality Date    APPENDECTOMY      FRACTURE SURGERY Right     pinky finger       Social History     Socioeconomic History    Marital status:    Tobacco Use    Smoking status: Never Smoker   Substance and Sexual Activity    Alcohol use: Never    Drug use: Never    Sexual activity: Yes       No current outpatient medications    Current Inpatient Medications   levetiracetam IV  500 mg Intravenous Q12H    lisinopriL  10 mg Oral Daily    niMODipine  60 mg Oral Q4H    potassium bicarbonate  40 mEq Oral Once       Current Intravenous Infusions   sodium chloride 0.9%  125 mL/hr at 08/14/22 0804    clevidipine Stopped (08/13/22 1829)       Review of Systems   Constitutional: Negative.    HENT: Negative.    Eyes: Negative.    Respiratory: Negative.    Cardiovascular: Negative.    Gastrointestinal: Negative.    Genitourinary: Negative.    Neurological: Positive for headaches.          Objective:       Intake/Output Summary (Last 24 hours) at 8/14/2022 0917  Last data filed at 8/14/2022 0600  Gross per 24 hour   Intake 4215 ml   Output 2900 ml   Net 1315 ml         Vital Signs (Most Recent):  Temp: 98 °F (36.7 °C) (08/14/22 0400)  Pulse: (!) 59 (08/14/22 0600)  Resp: 13 (08/14/22 0600)  BP: (!) 152/86 (08/14/22 0600)  SpO2: 99 % (08/14/22 0600)  Body mass index is 25.28 kg/m².  Weight: 68.9 kg (151 lb 14.4 oz) Vital Signs (24h Range):  Temp:  [97.4 °F (36.3 °C)-99.6 °F (37.6 °C)] 98 °F (36.7 °C)  Pulse:  [55-89] 59  Resp:  [12-31] 13  SpO2:  [95 %-100 %] 99 %  BP: ()/(37-98) 152/86     Physical Exam  Constitutional:       Appearance: Normal appearance.   HENT:      Head: Normocephalic and atraumatic.   Cardiovascular:      Rate and Rhythm: Normal rate and regular rhythm.      Heart sounds: Normal heart sounds.   Pulmonary:      Breath sounds: Normal breath sounds.   Abdominal:      General: Bowel sounds are normal.      Palpations: Abdomen is soft.   Musculoskeletal:         General: Normal range of motion.   Neurological:      Mental Status: He is alert and oriented to person, place, and time.      Comments: Motor 5/5 throughout, Sensation intact throughout, Pupils equal/round/reactive, CN III, IV, V, VI, VII intact   Psychiatric:         Mood and Affect: Mood normal.         Behavior: Behavior normal.         Lines/Drains/Airways     Peripheral Intravenous Line  Duration                Peripheral IV - Single Lumen 08/08/22 0435 20 G Anterior;Proximal;Right Forearm 6 days         Peripheral IV - Single Lumen 08/08/22 1200 20 G Anterior;Left Forearm 5 days                 Significant Labs:    Lab Results   Component Value Date    WBC 7.5 08/14/2022    HGB 13.9 (L) 08/14/2022    HCT 43.3 08/14/2022    MCV 81.7 08/14/2022     08/14/2022       BMP  Lab Results   Component Value Date     08/14/2022    K 3.3 (L) 08/14/2022    CO2 26 08/14/2022    BUN 10.3 08/14/2022    CREATININE 0.84 08/14/2022    CALCIUM 9.0 08/14/2022       ABG  No results for input(s): PH, PO2, PCO2, HCO3, BE in the last 168 hours.    Significant Imaging:  US Transcran Doppler Intracran Artr Comp  Narrative: EXAMINATION:  US TRANSCRAN DOPPLER INTRACRAN ARTR COMP    CLINICAL HISTORY:  SAH;    TECHNIQUE:  Multiple transtemporal grayscale sonographic images and Doppler interrogation of the [place] and associated intracranial branch vessels was performed.    COMPARISON:  Ultrasound dated 08/10/2022    FINDINGS:  No focal grayscale abnormality is identified.    Right vasculature:    MCA: 117.9 cm/s    BLAYNE: 62.9 cm/s    PCA: 58 cm/s    Left vasculature:    MCA: 65 cm/s    BLAYNE: 60.1 cm/s    PCA: 61.8 cm/s  Impression: No vasospasm identified.    Electronically signed by: Nithya Mason  Date:    08/12/2022  Time:    07:54        Assessment/Plan:     Assessment  1. SAH without evidence of aneurysm noted on cerebral angio   2. Basal artery vasospasms s/p verapamil injection     Plan  -Goal is to keep SBP less than 140. On Lisinopril and occasionally requiring cleviprex. Will keep lisinopril dose the same this morning.  -Continue Nimotop, Keppra, and IVF under the direction of Neurosurgery   -Per noted plans to keep in ICU the full 14 days       Sean Portillo MD PGY 3  Pulmonary Critical Care Medicine  [facility]

## 2022-08-15 LAB
ANION GAP SERPL CALC-SCNC: 6 MEQ/L
BASOPHILS # BLD AUTO: 0.04 X10(3)/MCL (ref 0–0.2)
BASOPHILS NFR BLD AUTO: 0.6 %
BUN SERPL-MCNC: 9.3 MG/DL (ref 8.9–20.6)
CALCIUM SERPL-MCNC: 8.7 MG/DL (ref 8.4–10.2)
CHLORIDE SERPL-SCNC: 107 MMOL/L (ref 98–107)
CO2 SERPL-SCNC: 25 MMOL/L (ref 22–29)
CREAT SERPL-MCNC: 0.85 MG/DL (ref 0.73–1.18)
CREAT/UREA NIT SERPL: 11
EOSINOPHIL # BLD AUTO: 0.39 X10(3)/MCL (ref 0–0.9)
EOSINOPHIL NFR BLD AUTO: 5.7 %
ERYTHROCYTE [DISTWIDTH] IN BLOOD BY AUTOMATED COUNT: 13.4 % (ref 11.5–17)
GFR SERPLBLD CREATININE-BSD FMLA CKD-EPI: >60 MLS/MIN/1.73/M2
GLUCOSE SERPL-MCNC: 110 MG/DL (ref 74–100)
HCT VFR BLD AUTO: 40.3 % (ref 42–52)
HGB BLD-MCNC: 13.1 GM/DL (ref 14–18)
IMM GRANULOCYTES # BLD AUTO: 0.03 X10(3)/MCL (ref 0–0.04)
IMM GRANULOCYTES NFR BLD AUTO: 0.4 %
LYMPHOCYTES # BLD AUTO: 1.33 X10(3)/MCL (ref 0.6–4.6)
LYMPHOCYTES NFR BLD AUTO: 19.5 %
MCH RBC QN AUTO: 25.9 PG (ref 27–31)
MCHC RBC AUTO-ENTMCNC: 32.5 MG/DL (ref 33–36)
MCV RBC AUTO: 79.6 FL (ref 80–94)
MONOCYTES # BLD AUTO: 0.61 X10(3)/MCL (ref 0.1–1.3)
MONOCYTES NFR BLD AUTO: 8.9 %
NEUTROPHILS # BLD AUTO: 4.4 X10(3)/MCL (ref 2.1–9.2)
NEUTROPHILS NFR BLD AUTO: 64.9 %
NRBC BLD AUTO-RTO: 0 %
PLATELET # BLD AUTO: 206 X10(3)/MCL (ref 130–400)
PMV BLD AUTO: 10.2 FL (ref 7.4–10.4)
POTASSIUM SERPL-SCNC: 3.2 MMOL/L (ref 3.5–5.1)
RBC # BLD AUTO: 5.06 X10(6)/MCL (ref 4.7–6.1)
SODIUM SERPL-SCNC: 138 MMOL/L (ref 136–145)
WBC # SPEC AUTO: 6.8 X10(3)/MCL (ref 4.5–11.5)

## 2022-08-15 PROCEDURE — 85025 COMPLETE CBC W/AUTO DIFF WBC: CPT | Performed by: STUDENT IN AN ORGANIZED HEALTH CARE EDUCATION/TRAINING PROGRAM

## 2022-08-15 PROCEDURE — 36415 COLL VENOUS BLD VENIPUNCTURE: CPT | Performed by: STUDENT IN AN ORGANIZED HEALTH CARE EDUCATION/TRAINING PROGRAM

## 2022-08-15 PROCEDURE — 25000003 PHARM REV CODE 250: Performed by: NEUROLOGICAL SURGERY

## 2022-08-15 PROCEDURE — 25000003 PHARM REV CODE 250: Performed by: HOSPITALIST

## 2022-08-15 PROCEDURE — 25000003 PHARM REV CODE 250: Performed by: STUDENT IN AN ORGANIZED HEALTH CARE EDUCATION/TRAINING PROGRAM

## 2022-08-15 PROCEDURE — 25000003 PHARM REV CODE 250: Performed by: INTERNAL MEDICINE

## 2022-08-15 PROCEDURE — 80048 BASIC METABOLIC PNL TOTAL CA: CPT | Performed by: STUDENT IN AN ORGANIZED HEALTH CARE EDUCATION/TRAINING PROGRAM

## 2022-08-15 PROCEDURE — 63600175 PHARM REV CODE 636 W HCPCS: Performed by: STUDENT IN AN ORGANIZED HEALTH CARE EDUCATION/TRAINING PROGRAM

## 2022-08-15 PROCEDURE — 20000000 HC ICU ROOM

## 2022-08-15 RX ADMIN — NIMODIPINE 60 MG: 30 CAPSULE, LIQUID FILLED ORAL at 02:08

## 2022-08-15 RX ADMIN — MORPHINE SULFATE 2 MG: 4 INJECTION INTRAVENOUS at 10:08

## 2022-08-15 RX ADMIN — LISINOPRIL 10 MG: 10 TABLET ORAL at 08:08

## 2022-08-15 RX ADMIN — NIMODIPINE 60 MG: 30 CAPSULE, LIQUID FILLED ORAL at 10:08

## 2022-08-15 RX ADMIN — BUTALBITAL, ACETAMINOPHEN AND CAFFEINE 1 TABLET: 50; 325; 40 TABLET ORAL at 09:08

## 2022-08-15 RX ADMIN — POTASSIUM BICARBONATE 50 MEQ: 977.5 TABLET, EFFERVESCENT ORAL at 08:08

## 2022-08-15 RX ADMIN — LEVETIRACETAM 500 MG: 100 INJECTION, SOLUTION, CONCENTRATE INTRAVENOUS at 08:08

## 2022-08-15 RX ADMIN — NIMODIPINE 60 MG: 30 CAPSULE, LIQUID FILLED ORAL at 06:08

## 2022-08-15 RX ADMIN — ONDANSETRON 8 MG: 2 INJECTION INTRAMUSCULAR; INTRAVENOUS at 03:08

## 2022-08-15 RX ADMIN — MORPHINE SULFATE 2 MG: 4 INJECTION INTRAVENOUS at 04:08

## 2022-08-15 RX ADMIN — HYDRALAZINE HYDROCHLORIDE 10 MG: 20 INJECTION INTRAMUSCULAR; INTRAVENOUS at 09:08

## 2022-08-15 RX ADMIN — SODIUM CHLORIDE: 9 INJECTION, SOLUTION INTRAVENOUS at 02:08

## 2022-08-15 RX ADMIN — LEVETIRACETAM 500 MG: 100 INJECTION, SOLUTION, CONCENTRATE INTRAVENOUS at 09:08

## 2022-08-15 RX ADMIN — NIMODIPINE 60 MG: 30 CAPSULE, LIQUID FILLED ORAL at 09:08

## 2022-08-15 RX ADMIN — SODIUM CHLORIDE: 9 INJECTION, SOLUTION INTRAVENOUS at 01:08

## 2022-08-15 RX ADMIN — BUTALBITAL, ACETAMINOPHEN AND CAFFEINE 1 TABLET: 50; 325; 40 TABLET ORAL at 02:08

## 2022-08-15 RX ADMIN — HYDRALAZINE HYDROCHLORIDE 10 MG: 20 INJECTION INTRAMUSCULAR; INTRAVENOUS at 06:08

## 2022-08-15 NOTE — PROGRESS NOTES
[facility] [address]  Neurosurgery  Progress Note    Subjective:     Interval History: Patient lying in bed, NAD. His HA has improved. He denies pain behind his eyes, no blurred vision.    History of Present Illness: Patient is a 47yo male with no significant PMHX, who presented to ED this am with c/o pressure to the back of his head that began suddenly around 0030 this am, waking him from his sleep. He reports the pain from his head radiated down to his shoulders. He did take some medication and laid back down. Pt reports he felt better after this, but then the pain to his head soon returned. He reports 3 episodes of vomiting. He denies any previous neck/back sx, vision changes, or any other medical hx. Pt also denies ETOH, smoking, or drugs.      On arrival to the ED a CT head revealed SAH in the prepontine cistern and sylvian fissures with a small volume IVH. Dr. Buchanan was consulted on 8/8/2022 for evaluation and treatment recommendations.     8/10/2022: Patient underwent cerebral angio with Dr. Ivory. No evidence of aneurysm. Verapamil administered for vasospasm of basilar artery    8/11/2022: Patient resting in bed. Reports some eye pain since the angiogram, not worsening or improving. Continues with headaches, unchanged. Receiving morphine for pain, works well but does not last. Family at bedside during rounds.    8/12/2022: Patient lying in bed, NAD. His eye pain has resolved. His HA is controlled. His exam has been stable.    8/13/2022: Patient sitting up in his chair, NAD. His eye pain has resolved. He continues with HA, controlled with Fioricet. He denies blurred vision. He is tolerating PO. He is doing much better overall since neuro checks were decreased.      08/14/2022:  Patient resting in bed.  No acute distress but does appear a bit more depressed.  Vital signs stable.  Headache controlled.  TCD with no vasospasm today.  Continue therapies.      Post-Op Info:  * No surgery  "found *          Medications:  Continuous Infusions:   sodium chloride 0.9% 125 mL/hr at 08/15/22 0203    clevidipine Stopped (08/13/22 1829)     Scheduled Meds:   levetiracetam IV  500 mg Intravenous Q12H    lisinopriL  10 mg Oral Daily    niMODipine  60 mg Oral Q4H     PRN Meds:albuterol-ipratropium, butalbital-acetaminophen-caffeine -40 mg, hydrALAZINE, HYDROcodone-acetaminophen, labetalol, morphine, ondansetron, sodium chloride 0.9%     Review of Systems    Objective:     Weight: 68.9 kg (151 lb 14.4 oz)  Body mass index is 25.28 kg/m².  Vital Signs (Most Recent):  Temp: 98.1 °F (36.7 °C) (08/15/22 0400)  Pulse: 73 (08/15/22 0600)  Resp: 15 (08/15/22 0600)  BP: 133/86 (08/15/22 1029)  SpO2: 99 % (08/15/22 0600) Vital Signs (24h Range):  Temp:  [97.8 °F (36.6 °C)-98.1 °F (36.7 °C)] 98.1 °F (36.7 °C)  Pulse:  [58-83] 73  Resp:  [13-25] 15  SpO2:  [96 %-100 %] 99 %  BP: (100-155)/() 133/86                          Urethral Catheter 08/10/22 1454 Silicone 16 Fr. (Active)   Site Assessment Clean;Intact;Dry 08/12/22 0715   Collection Container Standard drainage bag 08/12/22 0715   Securement Method secured to top of thigh w/ adhesive device 08/12/22 0715   Catheter Care Performed yes 08/12/22 0715   Reason for Continuing Urinary Catheterization Critically ill in ICU and requiring hourly monitoring of intake/output 08/12/22 0715   CAUTI Prevention Bundle Securement Device in place with 1" slack;Intact seal between catheter & drainage tubing;Drainage bag/urimeter off the floor;Sheeting clip in use;No dependent loops or kinks;Drainage bag/urimeter below bladder;Drainage bag/urimeter not overfilled (<2/3 full) 08/12/22 0715   Output (mL) 1750 mL 08/12/22 0200       Neurosurgery Physical Exam    General: well developed, well nourished, no distress  Neurologic: GCS: Motor: 6/Verbal: 5/Eyes: 4 GCS Total: 15  Mental Status: Awake, Alert, Oriented x4  Cranial nerves: face symmetric, tongue midline, pupils " equal, round, reactive to light with accomodation, extraocular muscles intact  Sensory: intact to light touch and pin prick throughout  Motor Strength:full strength upper and lower extremities  Gait: Not assessed - groin sheath in place  Head: normocephalic, atraumatic  Lungs:  clear to auscultation bilaterally and normal respiratory effort  Heart: regular rate and rhythm  Abdomen: soft, non-tender non-distented; bowel sounds normal  Extremities: warm, well perfused and no cyanosis or edema,    Significant Labs:  Recent Labs   Lab 08/14/22  0159 08/15/22  0205    138   K 3.3* 3.2*   CO2 26 25   BUN 10.3 9.3   CREATININE 0.84 0.85   CALCIUM 9.0 8.7     Recent Labs   Lab 08/14/22  0159 08/15/22  0205   WBC 7.5 6.8   HGB 13.9* 13.1*   HCT 43.3 40.3*    206     No results for input(s): LABPT, INR, APTT in the last 48 hours.  Microbiology Results (last 7 days)     ** No results found for the last 168 hours. **          Assessment/Plan:     Active Diagnoses:    Diagnosis Date Noted POA    SAH (subarachnoid hemorrhage) [I60.9] 08/10/2022 Yes      Problems Resolved During this Admission:     Continue HHH therapy  Seizure precautions -Keppra  Continue blood pressure recommendations per Neurology.   Nimotop-do not skip dosages.  TCDs M/W/F.  Today's TCD negative for vasospasm.  Neurology following.  There are plans to repeat angiogram in the upcoming weeks.  Continue close neurological monitoring.  Fall precautions  PTOT if needed.    SCDs       Please call with any decline    AMINATA Fountain-BC  Neurosurgery  [facility]

## 2022-08-15 NOTE — PROGRESS NOTES
[facility]                       [address]                      [place] [address]    PATIENT NAME:       ROBERTO CARLOS BLANK   YOB: 1975  CSN:                [id number]   MRN:                [id number]  ADMIT DATE:         08/08/2022 04:22:00  PHYSICIAN:          RIAN Spear MD                            PROGRESS NOTE    DATE:      Mr. Blank is a 46-year-old patient who presented to the hospital with a   spontaneous subarachnoid hemorrhage with mild obstructing hydrocephalus.  Taken   to the cath lab on date of admission, 08/08/2022, without any evidence of   aneurysmal rupture noted at that time.  The notes state that Dr. Ivory will   plan on taking him back to the cath lab at some point in the near future for   repeat angiogram.  The weekend was quiet.  He has had to get hydralazine 1 time   per shift.  He is on lisinopril now for blood pressure control.  He is getting   transcranial Dopplers Monday, Wednesday, Friday, and that is pending from this   morning as well.    PHYSICAL EXAM:  VITAL SIGNS:  Today, his temp is 98.1, blood pressure listed as   140/84, heart rate 73, respirations 15 unlabored, satting 99% on room air.  HEENT:  Unremarkable.  CHEST:  Diminished but clear.  HEART:  Regular in rhythm.  ABDOMEN:  Benign, scaphoid.  EXTREMITIES:  No significant edema.    LABORATORY DATA:  Labs from this morning are as follows:  H and H 13 and 40,   white count 6800, platelet count 206.  Sodium 138, potassium 3.2, chloride 107,   bicarb 25, BUN 9, creatinine 0.85.    IMPRESSION:    1. Hospital day 7 for subarachnoid hemorrhage without evidence of aneurysm seen   on angiogram 08/10/2022.   2. Basal artery vasospasm status post verapamil injection on admit.  3. Hypertension.    PLAN:  We are continuing to follow recommendations from Neurology.  He is on   Nimotop, Keppra for seizure prophylaxis.  Continue with IV fluids.  Will be    getting a PCD today.  Continue close observation and follow recs from Neurology   and Neurosurgery.  Anticipate a repeat cerebral angiogram at some time in the   near future.        ______________________________  G MD JALEESA Esteban/ALYSON  DD:  08/15/2022  Time:  06:43AM  DT:  08/15/2022  Time:  06:54AM  Job #:  [id number]      PROGRESS NOTE

## 2022-08-16 LAB
ANION GAP SERPL CALC-SCNC: 8 MEQ/L
BASOPHILS # BLD AUTO: 0.04 X10(3)/MCL (ref 0–0.2)
BASOPHILS NFR BLD AUTO: 0.5 %
BUN SERPL-MCNC: 7 MG/DL (ref 8.9–20.6)
CALCIUM SERPL-MCNC: 9.1 MG/DL (ref 8.4–10.2)
CHLORIDE SERPL-SCNC: 104 MMOL/L (ref 98–107)
CO2 SERPL-SCNC: 26 MMOL/L (ref 22–29)
CREAT SERPL-MCNC: 0.88 MG/DL (ref 0.73–1.18)
CREAT/UREA NIT SERPL: 8
EOSINOPHIL # BLD AUTO: 0.19 X10(3)/MCL (ref 0–0.9)
EOSINOPHIL NFR BLD AUTO: 2.3 %
ERYTHROCYTE [DISTWIDTH] IN BLOOD BY AUTOMATED COUNT: 13.5 % (ref 11.5–17)
GFR SERPLBLD CREATININE-BSD FMLA CKD-EPI: >60 MLS/MIN/1.73/M2
GLUCOSE SERPL-MCNC: 145 MG/DL (ref 74–100)
HCT VFR BLD AUTO: 42 % (ref 42–52)
HGB BLD-MCNC: 13.5 GM/DL (ref 14–18)
IMM GRANULOCYTES # BLD AUTO: 0.02 X10(3)/MCL (ref 0–0.04)
IMM GRANULOCYTES NFR BLD AUTO: 0.2 %
LYMPHOCYTES # BLD AUTO: 0.97 X10(3)/MCL (ref 0.6–4.6)
LYMPHOCYTES NFR BLD AUTO: 11.8 %
MCH RBC QN AUTO: 26 PG (ref 27–31)
MCHC RBC AUTO-ENTMCNC: 32.1 MG/DL (ref 33–36)
MCV RBC AUTO: 80.8 FL (ref 80–94)
MONOCYTES # BLD AUTO: 0.61 X10(3)/MCL (ref 0.1–1.3)
MONOCYTES NFR BLD AUTO: 7.4 %
NEUTROPHILS # BLD AUTO: 6.4 X10(3)/MCL (ref 2.1–9.2)
NEUTROPHILS NFR BLD AUTO: 77.8 %
NRBC BLD AUTO-RTO: 0 %
PLATELET # BLD AUTO: 229 X10(3)/MCL (ref 130–400)
PMV BLD AUTO: 10.4 FL (ref 7.4–10.4)
POTASSIUM SERPL-SCNC: 3.9 MMOL/L (ref 3.5–5.1)
RBC # BLD AUTO: 5.2 X10(6)/MCL (ref 4.7–6.1)
SODIUM SERPL-SCNC: 138 MMOL/L (ref 136–145)
WBC # SPEC AUTO: 8.2 X10(3)/MCL (ref 4.5–11.5)

## 2022-08-16 PROCEDURE — 20000000 HC ICU ROOM

## 2022-08-16 PROCEDURE — 36415 COLL VENOUS BLD VENIPUNCTURE: CPT | Performed by: STUDENT IN AN ORGANIZED HEALTH CARE EDUCATION/TRAINING PROGRAM

## 2022-08-16 PROCEDURE — 25000003 PHARM REV CODE 250: Performed by: STUDENT IN AN ORGANIZED HEALTH CARE EDUCATION/TRAINING PROGRAM

## 2022-08-16 PROCEDURE — 25000003 PHARM REV CODE 250: Performed by: NEUROLOGICAL SURGERY

## 2022-08-16 PROCEDURE — 80048 BASIC METABOLIC PNL TOTAL CA: CPT | Performed by: STUDENT IN AN ORGANIZED HEALTH CARE EDUCATION/TRAINING PROGRAM

## 2022-08-16 PROCEDURE — 85025 COMPLETE CBC W/AUTO DIFF WBC: CPT | Performed by: STUDENT IN AN ORGANIZED HEALTH CARE EDUCATION/TRAINING PROGRAM

## 2022-08-16 PROCEDURE — 63600175 PHARM REV CODE 636 W HCPCS: Performed by: STUDENT IN AN ORGANIZED HEALTH CARE EDUCATION/TRAINING PROGRAM

## 2022-08-16 PROCEDURE — 99232 SBSQ HOSP IP/OBS MODERATE 35: CPT | Mod: ,,, | Performed by: NURSE PRACTITIONER

## 2022-08-16 PROCEDURE — 99232 PR SUBSEQUENT HOSPITAL CARE,LEVL II: ICD-10-PCS | Mod: ,,, | Performed by: NURSE PRACTITIONER

## 2022-08-16 PROCEDURE — 25000003 PHARM REV CODE 250: Performed by: NURSE PRACTITIONER

## 2022-08-16 PROCEDURE — 25000003 PHARM REV CODE 250: Performed by: INTERNAL MEDICINE

## 2022-08-16 RX ORDER — NOREPINEPHRINE BITARTRATE/D5W 8 MG/250ML
PLASTIC BAG, INJECTION (ML) INTRAVENOUS
Status: DISPENSED
Start: 2022-08-16 | End: 2022-08-16

## 2022-08-16 RX ORDER — BACLOFEN 5 MG/1
5 TABLET ORAL ONCE
Status: COMPLETED | OUTPATIENT
Start: 2022-08-16 | End: 2022-08-16

## 2022-08-16 RX ADMIN — MORPHINE SULFATE 2 MG: 4 INJECTION INTRAVENOUS at 06:08

## 2022-08-16 RX ADMIN — NIMODIPINE 60 MG: 30 CAPSULE, LIQUID FILLED ORAL at 06:08

## 2022-08-16 RX ADMIN — SODIUM CHLORIDE: 9 INJECTION, SOLUTION INTRAVENOUS at 01:08

## 2022-08-16 RX ADMIN — LISINOPRIL 10 MG: 10 TABLET ORAL at 08:08

## 2022-08-16 RX ADMIN — MORPHINE SULFATE 2 MG: 4 INJECTION INTRAVENOUS at 10:08

## 2022-08-16 RX ADMIN — NIMODIPINE 60 MG: 30 CAPSULE, LIQUID FILLED ORAL at 01:08

## 2022-08-16 RX ADMIN — NIMODIPINE 60 MG: 30 CAPSULE, LIQUID FILLED ORAL at 09:08

## 2022-08-16 RX ADMIN — LEVETIRACETAM 500 MG: 100 INJECTION, SOLUTION, CONCENTRATE INTRAVENOUS at 09:08

## 2022-08-16 RX ADMIN — NIMODIPINE 60 MG: 30 CAPSULE, LIQUID FILLED ORAL at 02:08

## 2022-08-16 RX ADMIN — BACLOFEN 5 MG: 5 TABLET ORAL at 07:08

## 2022-08-16 RX ADMIN — LEVETIRACETAM 500 MG: 100 INJECTION, SOLUTION, CONCENTRATE INTRAVENOUS at 08:08

## 2022-08-16 RX ADMIN — NIMODIPINE 60 MG: 30 CAPSULE, LIQUID FILLED ORAL at 10:08

## 2022-08-16 NOTE — PROGRESS NOTES
The date of the original cerebral arteriogram should read 08/10/2022                [facility]                       [address] [address]    PATIENT NAME:       ROBERTO CARLOS BLANK   YOB: 1975  CSN:                [id number]   MRN:                [id number]  ADMIT DATE:         08/08/2022 04:22:00  PHYSICIAN:          RIAN Spear MD                            PROGRESS NOTE    DATE:      Mr. Blank is a 46-year-old patient who had spontaneous subarachnoid hemorrhage   with mild obstructing hydrocephalus.  He was taken to the cath lab on 08/08/2022   by Dr. Ivory without any evidence of an aneurysmal rupture.  The patient   has been treated with IV fluids.  We are keeping his systolic blood pressure   under 140.  He had transcranial Doppler yesterday, which did not show any   vasospasms.  He is awake, alert.  Viry says his headache is almost completely   gone.  He has a mild headache every now and then, but he said the headache is   much improved.  They are anticipating doing another angiogram.  I believe that   is going to be Friday of this week, although that has not been definitely set at   this time.    PHYSICAL EXAMINATION:  VITAL SIGNS:  From this morning are as follows:  Temp 98.1, blood pressure   134/94, heart rate 74, respirations 17, satting 100% on room air.  HEENT:  Unremarkable.  CHEST:  Diminished but clear.  HEART:  Regular in rhythm.  ABDOMEN:  Benign.  EXTREMITIES:  There is no significant edema.  NEUROLOGICAL:  He is awake, alert, completely intact.    LAB WORK:  From this morning, H and H 13.5 and 42, white count 8200, platelet   count 229.  Sodium 138, potassium 3.9, chloride 104, bicarb 26, BUN 7,   creatinine 0.88.    IMPRESSION:    1. Hospital day #8 for subarachnoid hemorrhage without evidence of aneurysmal   rupture, seen on angiogram 08/10/2022.  2. Basal artery vasospasm, status post verapamil  injection on admit.  No   evidence of basal spasm on PCD yesterday.  3. Hypertension:  Currently controlled.    PLAN:   Continue to follow Neurology recommendations.  He is to continue with IV   fluids, Nimotop, and Keppra for seizure prophylaxis.  Anticipate a cerebral   angiogram repeat sometime in the near future.  Will get further recommendations   from Neurology.  Will discontinue daily labs.        ______________________________  G MD JALEESA Esteban/ALYSON  DD:  08/16/2022  Time:  09:26AM  DT:  08/16/2022  Time:  09:41AM  Job #:  [id number]      PROGRESS NOTE

## 2022-08-16 NOTE — PROGRESS NOTES
Hospital Progress Note  [facility] Neurosurgery    Admit Date: 8/8/2022  Post-operative Day:    Hospital Day: 9    SUBJECTIVE:   Follow-up For:  SAH    Chief Complaint:  Chief Complaint   Patient presents with    Headache       Complaint of new onset headache, with pressure. Vomited x 1 prior to arrival.  Symptoms started at 0030       Interval History:  TCDs have been stable without evidence of vasospasm. Currently he denies any headache. Eye pain is resolved. He is ambulating in the room with his wife earlier today during rounds. No new issues.     History of Present Illness:  Patient is a 45yo male with no significant PMHX, who presented to ED this am with c/o pressure to the back of his head that began suddenly around 0030 this am, waking him from his sleep. He reports the pain from his head radiated down to his shoulders. He did take some medication and laid back down. Pt reports he felt better after this, but then the pain to his head soon returned. He reports 3 episodes of vomiting. He denies any previous neck/back sx, vision changes, or any other medical hx. Pt also denies ETOH, smoking, or drugs.      On arrival to the ED a CT head revealed SAH in the prepontine cistern and sylvian fissures with a small volume IVH. Dr. Buchanan was consulted on 8/8/2022 for evaluation and treatment recommendations.    8/10/2022: Patient underwent cerebral angio with Dr. Ivory. No evidence of aneurysm. Verapamil administered for vasospasm of basilar artery    Scheduled Meds:   levetiracetam IV  500 mg Intravenous Q12H    lisinopriL  10 mg Oral Daily    niMODipine  60 mg Oral Q4H     Continuous Infusions:   sodium chloride 0.9% 125 mL/hr at 08/16/22 0111    clevidipine Stopped (08/13/22 1829)     PRN Meds:albuterol-ipratropium, butalbital-acetaminophen-caffeine -40 mg, hydrALAZINE, HYDROcodone-acetaminophen, labetalol, morphine, ondansetron, sodium chloride 0.9%    Review of patient's allergies  indicates:   Allergen Reactions    Shellfish containing products      History reviewed. No pertinent past medical history.  Past Surgical History:   Procedure Laterality Date    APPENDECTOMY      FRACTURE SURGERY Right     pinky finger     ROS:  Review of Systems   Eyes: Positive for pain.   Neurological: Positive for headaches.     OBJECTIVE:     Vital Signs (Most Recent)  Temp: 98.4 °F (36.9 °C) (08/16/22 0800)  Pulse: 66 (08/16/22 1215)  Resp: (!) 22 (08/16/22 1215)  BP: 118/73 (08/16/22 1200)  SpO2: 99 % (08/16/22 1215)    Vital Signs Range (Last 24H):  Temp:  [97.7 °F (36.5 °C)-98.4 °F (36.9 °C)]   Pulse:  [58-98]   Resp:  [9-32]   BP: (103-145)/(57-98)   SpO2:  [94 %-100 %]     I & O (Last 24H):    Intake/Output Summary (Last 24 hours) at 8/16/2022 1229  Last data filed at 8/16/2022 1000  Gross per 24 hour   Intake 2160 ml   Output 1275 ml   Net 885 ml     Physical Exam:  General: well developed, well nourished, no distress  Neurologic: GCS: Motor: 6/Verbal: 5/Eyes: 4 GCS Total: 15  Mental Status: Awake, Alert, Oriented x4  Cranial nerves: face symmetric, tongue midline, pupils equal, round, reactive to light with accomodation, extraocular muscles intact  Sensory: intact to light touch and pin prick throughout  Motor Strength: full strength upper and lower extremities  Gait: Steady  Head: normocephalic, atraumatic  Lungs:  normal respiratory effort  Heart: regular rate and rhythm  Abdomen: soft, non-tender non-distented; bowel sounds normal  Extremities: warm, well perfused and no cyanosis or edema,    Lines/Drains:       Peripheral IV - Single Lumen 08/08/22 0435 20 G Anterior;Proximal;Right Forearm (Active)   Site Assessment Clean;Dry;Intact 08/11/22 1000   Extremity Assessment Distal to IV No abnormal discoloration;No redness;No warmth;No swelling 08/11/22 1000   Line Status Infusing 08/11/22 1000   Dressing Status Dry;Intact;Clean 08/11/22 1000   Dressing Intervention Integrity maintained 08/11/22 1000  "  Number of days: 3            Peripheral IV - Single Lumen 08/08/22 1200 20 G Anterior;Left Forearm (Active)   Site Assessment Clean;Dry;Intact 08/11/22 1000   Extremity Assessment Distal to IV No abnormal discoloration;No redness;No swelling;No warmth 08/11/22 1000   Line Status Saline locked 08/11/22 1000   Dressing Status Clean;Dry;Intact 08/11/22 1000   Dressing Intervention Integrity maintained 08/11/22 1000   Number of days: 2            Urethral Catheter 08/10/22 1454 Silicone 16 Fr. (Active)   Site Assessment Clean;Intact;Dry 08/11/22 0800   Collection Container Standard drainage bag 08/11/22 0800   Securement Method secured to top of thigh w/ adhesive device 08/11/22 0800   Catheter Care Performed yes 08/11/22 0800   Reason for Continuing Urinary Catheterization Critically ill in ICU and requiring hourly monitoring of intake/output 08/11/22 0800   CAUTI Prevention Bundle Securement Device in place with 1" slack;Intact seal between catheter & drainage tubing;Drainage bag/urimeter off the floor;Sheeting clip in use;No dependent loops or kinks;Drainage bag/urimeter not overfilled (<2/3 full);Drainage bag/urimeter below bladder 08/11/22 0800   Output (mL) 700 mL 08/11/22 0200   Number of days: 0            Sheath 08/10/22 1511 Right anterior (Active)   Insertion Site clean and dry;dressing intact;no hematoma 08/11/22 1100   Drainage Amount None 08/11/22 1100   Number of days: 0     Laboratory:  I have reviewed all pertinent lab results within the past 24 hours.  CBC:   Recent Labs   Lab 08/16/22  0510   WBC 8.2   RBC 5.20   HGB 13.5*   HCT 42.0      MCV 80.8   MCH 26.0*   MCHC 32.1*     BMP:   Recent Labs   Lab 08/16/22  0510      K 3.9   CO2 26   BUN 7.0*   CREATININE 0.88   CALCIUM 9.1       ASSESSMENT/PLAN:     Problem List Items Addressed This Visit        Neuro    SAH (subarachnoid hemorrhage)    Relevant Orders    Place sequential compression device      Other Visit Diagnoses     Acute " nonintractable headache, unspecified headache type    -  Primary        PLAN  Continue HHH therapy  On KeMerlene tolbertp  Neurology following, will be repeated angio at some point  Discussed the above with patient  Continue close neurological monitoring.    Please call with any decline          JOHN Baeza

## 2022-08-16 NOTE — PROGRESS NOTES
Patient not seen today.  Case discussed with Dr Ivory.      Will plan for repeat cerebral angiogram tomorrow afternoon.  OK to eat breakfast, then NPO prior to procedure.

## 2022-08-17 PROCEDURE — 25000003 PHARM REV CODE 250: Performed by: STUDENT IN AN ORGANIZED HEALTH CARE EDUCATION/TRAINING PROGRAM

## 2022-08-17 PROCEDURE — 99232 PR SUBSEQUENT HOSPITAL CARE,LEVL II: ICD-10-PCS | Mod: ,,, | Performed by: NEUROLOGICAL SURGERY

## 2022-08-17 PROCEDURE — 25500020 PHARM REV CODE 255: Performed by: PSYCHIATRY & NEUROLOGY

## 2022-08-17 PROCEDURE — 25000003 PHARM REV CODE 250: Performed by: PSYCHIATRY & NEUROLOGY

## 2022-08-17 PROCEDURE — 99232 SBSQ HOSP IP/OBS MODERATE 35: CPT | Mod: ,,, | Performed by: NEUROLOGICAL SURGERY

## 2022-08-17 PROCEDURE — 25000003 PHARM REV CODE 250: Performed by: INTERNAL MEDICINE

## 2022-08-17 PROCEDURE — 25000003 PHARM REV CODE 250: Performed by: NEUROLOGICAL SURGERY

## 2022-08-17 PROCEDURE — 63600175 PHARM REV CODE 636 W HCPCS: Performed by: PSYCHIATRY & NEUROLOGY

## 2022-08-17 PROCEDURE — 63600175 PHARM REV CODE 636 W HCPCS: Performed by: STUDENT IN AN ORGANIZED HEALTH CARE EDUCATION/TRAINING PROGRAM

## 2022-08-17 PROCEDURE — 20000000 HC ICU ROOM

## 2022-08-17 RX ORDER — DIPHENHYDRAMINE HYDROCHLORIDE 50 MG/ML
INJECTION INTRAMUSCULAR; INTRAVENOUS CODE/TRAUMA/SEDATION MEDICATION
Status: COMPLETED | OUTPATIENT
Start: 2022-08-17 | End: 2022-08-17

## 2022-08-17 RX ORDER — LIDOCAINE HYDROCHLORIDE 20 MG/ML
INJECTION, SOLUTION INFILTRATION; PERINEURAL CODE/TRAUMA/SEDATION MEDICATION
Status: COMPLETED | OUTPATIENT
Start: 2022-08-17 | End: 2022-08-17

## 2022-08-17 RX ORDER — ATROPINE SULFATE 0.1 MG/ML
INJECTION INTRAVENOUS
Status: DISCONTINUED
Start: 2022-08-17 | End: 2022-08-17 | Stop reason: WASHOUT

## 2022-08-17 RX ADMIN — NIMODIPINE 60 MG: 30 CAPSULE, LIQUID FILLED ORAL at 02:08

## 2022-08-17 RX ADMIN — NIMODIPINE 60 MG: 30 CAPSULE, LIQUID FILLED ORAL at 10:08

## 2022-08-17 RX ADMIN — SODIUM CHLORIDE: 9 INJECTION, SOLUTION INTRAVENOUS at 05:08

## 2022-08-17 RX ADMIN — IOPAMIDOL 100 ML: 755 INJECTION, SOLUTION INTRAVENOUS at 05:08

## 2022-08-17 RX ADMIN — NIMODIPINE 60 MG: 30 CAPSULE, LIQUID FILLED ORAL at 05:08

## 2022-08-17 RX ADMIN — DIPHENHYDRAMINE HYDROCHLORIDE 50 MG: 50 INJECTION INTRAMUSCULAR; INTRAVENOUS at 04:08

## 2022-08-17 RX ADMIN — LEVETIRACETAM 500 MG: 100 INJECTION, SOLUTION, CONCENTRATE INTRAVENOUS at 09:08

## 2022-08-17 RX ADMIN — LEVETIRACETAM 500 MG: 100 INJECTION, SOLUTION, CONCENTRATE INTRAVENOUS at 08:08

## 2022-08-17 RX ADMIN — LISINOPRIL 10 MG: 10 TABLET ORAL at 09:08

## 2022-08-17 RX ADMIN — NIMODIPINE 60 MG: 30 CAPSULE, LIQUID FILLED ORAL at 06:08

## 2022-08-17 RX ADMIN — NIMODIPINE 60 MG: 30 CAPSULE, LIQUID FILLED ORAL at 01:08

## 2022-08-17 RX ADMIN — LIDOCAINE HYDROCHLORIDE 5 ML: 20 INJECTION, SOLUTION INFILTRATION; PERINEURAL at 05:08

## 2022-08-17 RX ADMIN — NIMODIPINE 60 MG: 30 CAPSULE, LIQUID FILLED ORAL at 09:08

## 2022-08-17 RX ADMIN — METHYLPREDNISOLONE SODIUM SUCCINATE 125 MG: 40 INJECTION, POWDER, FOR SOLUTION INTRAMUSCULAR; INTRAVENOUS at 04:08

## 2022-08-17 NOTE — OP NOTE
[facility]                       [address] [place] [address]    PATIENT NAME:      ROBERTO CARLOS CHANCE   YOB: 1975  CSN:               [id number]  MRN:               [id number]  ADMIT DATE:        08/08/2022 04:22:00  PHYSICIAN:         Darek Ivory MD                          OPERATIVE REPORT      DATE OF SURGERY:    08/17/2022 00:00:00    SURGEON:  Darek Ivory MD    PREOPERATIVE DIAGNOSIS:  Subarachnoid hemorrhage.    POSTOPERATIVE DIAGNOSIS:  Normal angiogram.    PROCEDURES PERFORMED:    1. Cerebral angiogram with catheter insertion in the following arteries:  Right   subclavian, right vertebral, right common carotid, right internal carotid, left   common carotid, left internal carotid, left subclavian, left vertebral.  2. Three-dimensional rotational angiogram with post processing on a separate   station.    COMPLICATIONS:   None.    ANESTHESIA:  IV sedation.    DETAILED DESCRIPTION:  Following informed consent, the patient was prepped and   draped in the usual sterile fashion.  I infiltrated the right groin with local   anesthetic and punctured the right femoral artery placing a 5-[place] sheath   without incident.  I introduced my catheter and wire and advanced them to the   aortic arch.  I selected the right subclavian artery.  Angiographic images   demonstrated no stenosis at the origin of the right vertebral artery.  I   selected the right vertebral artery.  Cerebral AP and lateral views demonstrated   no evidence of significant stenosis, mass effect, vascular malformation, or   early venous drainage.  I next selected the right common carotid artery.    Angiographic images demonstrated a normal bifurcation.  I selected the right   internal carotid artery.  Cerebral AP and lateral views demonstrated no evidence   of significant stenosis, mass effect, vascular malformation, or early venous   drainage.   I performed a 3-dimensional rotational angiogram with post processing   on a separate station.  Rotational angiogram demonstrated no abnormalities.  I   next selected the left common carotid artery.  Angiographic images demonstrated   a normal bifurcation.  I selected the left internal carotid artery.  Cerebral AP   and lateral views demonstrated no evidence of significant stenosis, mass   effect, vascular malformation, or early venous drainage.  I then selected the   left subclavian artery.  Angiographic images demonstrated no stenosis at the   origin of the left vertebral artery.  I selected the left vertebral artery.    Cerebral AP and lateral views demonstrated no evidence of significant stenosis,   mass effect, vascular malformation, or early venous drainage.  I removed the   catheter.  The sheath was secured in place.  The patient tolerated the procedure   well.  No apparent complication.        ______________________________  Darek Ivory MD    DEP/AQS  DD:  08/17/2022  Time:  05:32PM  DT:  08/17/2022  Time:  05:58PM  Job #:  [id number]      OPERATIVE REPORT

## 2022-08-17 NOTE — PROGRESS NOTES
[facility]  Pulmonary Critical Care Note    Patient Name: Canelo Blank  MRN: [id number]  Admission Date: 8/8/2022  Hospital Length of Stay: 9 days  Code Status: Full Code  Attending Provider: Wes Roger MD  Primary Care Provider: Primary Doctor No     Subjective:     HPI:   Mr. Blank is a 46-year-old patient who had spontaneous subarachnoid hemorrhage with mild obstructing hydrocephalus.  He was taken to the cath lab on 08/08/2022 by Dr. Ivory without any evidence of an aneurysmal rupture. Neuro status has remained intact, he has some mild weakness of lower extremities upon ambulation but he is ambulating without difficulty. He has a mild headache every now and then, but he said the headache is   much improved. He Is currently NPO for repeat cerebral angiogram today per Dr Ivory.       History reviewed. No pertinent past medical history.    Past Surgical History:   Procedure Laterality Date    APPENDECTOMY      FRACTURE SURGERY Right     pinky finger       Social History     Socioeconomic History    Marital status:    Tobacco Use    Smoking status: Never Smoker   Substance and Sexual Activity    Alcohol use: Never    Drug use: Never    Sexual activity: Yes           No current outpatient medications    Current Inpatient Medications   levetiracetam IV  500 mg Intravenous Q12H    lisinopriL  10 mg Oral Daily    niMODipine  60 mg Oral Q4H       Current Intravenous Infusions   sodium chloride 0.9% 125 mL/hr at 08/17/22 0548    clevidipine Stopped (08/13/22 1829)         Review of Systems   Neurological:        Intermittent headaches          Objective:       Intake/Output Summary (Last 24 hours) at 8/17/2022 0823  Last data filed at 8/17/2022 0548  Gross per 24 hour   Intake 3045 ml   Output 2225 ml   Net 820 ml         Vital Signs (Most Recent):  Temp: 98.4 °F (36.9 °C) (08/17/22 0400)  Pulse: 69 (08/17/22 0630)  Resp: 15 (08/17/22 0630)  BP: (!) 109/47  (08/17/22 0630)  SpO2: 98 % (08/17/22 0630)  Body mass index is 25.28 kg/m².  Weight: 68.9 kg (151 lb 14.4 oz) Vital Signs (24h Range):  Temp:  [98 °F (36.7 °C)-98.4 °F (36.9 °C)] 98.4 °F (36.9 °C)  Pulse:  [52-90] 69  Resp:  [11-25] 15  SpO2:  [89 %-100 %] 98 %  BP: (106-172)/() 109/47     Physical Exam  Vitals reviewed.   Constitutional:       Appearance: Normal appearance.   HENT:      Head: Normocephalic and atraumatic.   Eyes:      Pupils: Pupils are equal, round, and reactive to light.   Cardiovascular:      Rate and Rhythm: Normal rate.      Heart sounds: Normal heart sounds.   Pulmonary:      Effort: Pulmonary effort is normal.      Breath sounds: Normal breath sounds.   Abdominal:      Palpations: Abdomen is soft.   Musculoskeletal:      Cervical back: Neck supple.   Skin:     General: Skin is warm and dry.   Neurological:      Mental Status: He is alert and oriented to person, place, and time.           Lines/Drains/Airways     Peripheral Intravenous Line  Duration                Peripheral IV - Single Lumen 08/08/22 0435 20 G Anterior;Proximal;Right Forearm 9 days         Peripheral IV - Single Lumen 08/08/22 1200 20 G Anterior;Left Forearm 8 days                Significant Labs:    Lab Results   Component Value Date    WBC 8.2 08/16/2022    HGB 13.5 (L) 08/16/2022    HCT 42.0 08/16/2022    MCV 80.8 08/16/2022     08/16/2022         BMP  Lab Results   Component Value Date     08/16/2022    K 3.9 08/16/2022    CO2 26 08/16/2022    BUN 7.0 (L) 08/16/2022    CREATININE 0.88 08/16/2022    CALCIUM 9.1 08/16/2022       ABG  No results for input(s): PH, PO2, PCO2, HCO3, BE in the last 168 hours.       Significant Imaging:  I have reviewed the pertinent imaging within the past 24 hours.        Assessment/Plan:     Assessment  1. Subarachnoid hemorrhage without evidence aneurysmal rupture on cerebal angio 8/10/22, plans for repeat cerebral angio tomorrow  2. Hypertension  3. S/P some basal  artery vasospasms post intracerebral verapamil on admit, TCD since have been negative for vasospasms      Plan  Plans for another cerebral angiogram today   Continue good blood pressure control  Continue Keppra for seizure prophylaxis and nimotop  Will continue ICU monitoring for now          NE Bonner  Pulmonary Critical Care Medicine  [facility]

## 2022-08-17 NOTE — PROGRESS NOTES
[facility] [address]  Neurosurgery  Progress Note    Subjective:     Interval History: Patient lying in bed, NAD. His HA has improved. He denies pain behind his eyes, no blurred vision.    History of Present Illness: Patient is a 47yo male with no significant PMHX, who presented to ED this am with c/o pressure to the back of his head that began suddenly around 0030 this am, waking him from his sleep. He reports the pain from his head radiated down to his shoulders. He did take some medication and laid back down. Pt reports he felt better after this, but then the pain to his head soon returned. He reports 3 episodes of vomiting. He denies any previous neck/back sx, vision changes, or any other medical hx. Pt also denies ETOH, smoking, or drugs.      On arrival to the ED a CT head revealed SAH in the prepontine cistern and sylvian fissures with a small volume IVH. Dr. Buchanan was consulted on 8/8/2022 for evaluation and treatment recommendations.     8/10/2022: Patient underwent cerebral angio with Dr. Ivory. No evidence of aneurysm. Verapamil administered for vasospasm of basilar artery    8/11/2022: Patient resting in bed. Reports some eye pain since the angiogram, not worsening or improving. Continues with headaches, unchanged. Receiving morphine for pain, works well but does not last. Family at bedside during rounds.    8/12/2022: Patient lying in bed, NAD. His eye pain has resolved. His HA is controlled. His exam has been stable.    8/13/2022: Patient sitting up in his chair, NAD. His eye pain has resolved. He continues with HA, controlled with Fioricet. He denies blurred vision. He is tolerating PO. He is doing much better overall since neuro checks were decreased.      08/14/2022:  Patient resting in bed.  No acute distress but does appear a bit more depressed.  Vital signs stable.  Headache controlled.  TCD with no vasospasm today.  Continue therapies.    08/17/2022:  Sitting up in bed  "resting.  No headache.  No current symptoms other than slight nuchal rigidity.  TCDs velocities within normal limits today.  Vital signs are stable.  He is scheduled for follow-up cerebral angiogram with Dr. Ivory today.      Post-Op Info:  * No surgery found *          Medications:  Continuous Infusions:   sodium chloride 0.9% 125 mL/hr at 08/17/22 0548    clevidipine Stopped (08/13/22 1829)     Scheduled Meds:   levetiracetam IV  500 mg Intravenous Q12H    lisinopriL  10 mg Oral Daily    niMODipine  60 mg Oral Q4H     PRN Meds:albuterol-ipratropium, butalbital-acetaminophen-caffeine -40 mg, hydrALAZINE, HYDROcodone-acetaminophen, labetalol, morphine, ondansetron, sodium chloride 0.9%     Review of Systems    Objective:     Weight: 68.9 kg (151 lb 14.4 oz)  Body mass index is 25.28 kg/m².  Vital Signs (Most Recent):  Temp: 98.4 °F (36.9 °C) (08/17/22 0400)  Pulse: 69 (08/17/22 0630)  Resp: 15 (08/17/22 0630)  BP: 122/78 (08/17/22 0904)  SpO2: 98 % (08/17/22 0630) Vital Signs (24h Range):  Temp:  [98 °F (36.7 °C)-98.4 °F (36.9 °C)] 98.4 °F (36.9 °C)  Pulse:  [52-85] 69  Resp:  [11-23] 15  SpO2:  [89 %-100 %] 98 %  BP: (106-172)/() 122/78                          Urethral Catheter 08/10/22 1454 Silicone 16 Fr. (Active)   Site Assessment Clean;Intact;Dry 08/12/22 0715   Collection Container Standard drainage bag 08/12/22 0715   Securement Method secured to top of thigh w/ adhesive device 08/12/22 0715   Catheter Care Performed yes 08/12/22 0715   Reason for Continuing Urinary Catheterization Critically ill in ICU and requiring hourly monitoring of intake/output 08/12/22 0715   CAUTI Prevention Bundle Securement Device in place with 1" slack;Intact seal between catheter & drainage tubing;Drainage bag/urimeter off the floor;Sheeting clip in use;No dependent loops or kinks;Drainage bag/urimeter below bladder;Drainage bag/urimeter not overfilled (<2/3 full) 08/12/22 0715   Output (mL) 1750 mL 08/12/22 " 0200       Neurosurgery Physical Exam    General: well developed, well nourished, no distress  Neurologic: GCS: Motor: 6/Verbal: 5/Eyes: 4 GCS Total: 15  Mental Status: Awake, Alert, Oriented x4  Cranial nerves: face symmetric, tongue midline, pupils equal, round, reactive to light with accomodation, extraocular muscles intact  Sensory: intact to light touch and pin prick throughout  Motor Strength:full strength upper and lower extremities  Gait: Not assessed - groin sheath in place  Head: normocephalic, atraumatic  Lungs:  clear to auscultation bilaterally and normal respiratory effort  Heart: regular rate and rhythm  Abdomen: soft, non-tender non-distented; bowel sounds normal  Extremities: warm, well perfused and no cyanosis or edema,    Significant Labs:  Recent Labs   Lab 08/16/22  0510      K 3.9   CO2 26   BUN 7.0*   CREATININE 0.88   CALCIUM 9.1     Recent Labs   Lab 08/16/22  0510   WBC 8.2   HGB 13.5*   HCT 42.0        No results for input(s): LABPT, INR, APTT in the last 48 hours.  Microbiology Results (last 7 days)     ** No results found for the last 168 hours. **          Assessment/Plan:     Active Diagnoses:    Diagnosis Date Noted POA    SAH (subarachnoid hemorrhage) [I60.9] 08/10/2022 Yes      Problems Resolved During this Admission:     Continue HHH therapy  Seizure precautions -Keppra  Continue blood pressure recommendations per Neurology.   Nimotop-do not skip dosages.  TCDs M/W/F.   Neurology following.    Continue close neurological monitoring.  Fall precautions  PTOT if needed.    SCDs       Please call with any decline    Follow-up cerebral angiogram this afternoon.    Marciano Chowdary, [id number]  Neurosurgery  [facility]

## 2022-08-18 LAB
POC ACTIVATED CLOTTING TIME K: 109 SEC (ref 74–137)
SAMPLE: NORMAL

## 2022-08-18 PROCEDURE — 20000000 HC ICU ROOM

## 2022-08-18 PROCEDURE — 25000003 PHARM REV CODE 250: Performed by: INTERNAL MEDICINE

## 2022-08-18 PROCEDURE — 63600175 PHARM REV CODE 636 W HCPCS: Performed by: STUDENT IN AN ORGANIZED HEALTH CARE EDUCATION/TRAINING PROGRAM

## 2022-08-18 PROCEDURE — 25000003 PHARM REV CODE 250: Performed by: STUDENT IN AN ORGANIZED HEALTH CARE EDUCATION/TRAINING PROGRAM

## 2022-08-18 PROCEDURE — 25000003 PHARM REV CODE 250: Performed by: NEUROLOGICAL SURGERY

## 2022-08-18 PROCEDURE — 99232 SBSQ HOSP IP/OBS MODERATE 35: CPT | Mod: ,,, | Performed by: NURSE PRACTITIONER

## 2022-08-18 PROCEDURE — 99232 PR SUBSEQUENT HOSPITAL CARE,LEVL II: ICD-10-PCS | Mod: ,,, | Performed by: NURSE PRACTITIONER

## 2022-08-18 RX ADMIN — NIMODIPINE 60 MG: 30 CAPSULE, LIQUID FILLED ORAL at 02:08

## 2022-08-18 RX ADMIN — SODIUM CHLORIDE: 9 INJECTION, SOLUTION INTRAVENOUS at 08:08

## 2022-08-18 RX ADMIN — NIMODIPINE 60 MG: 30 CAPSULE, LIQUID FILLED ORAL at 05:08

## 2022-08-18 RX ADMIN — LISINOPRIL 10 MG: 10 TABLET ORAL at 10:08

## 2022-08-18 RX ADMIN — LEVETIRACETAM 500 MG: 100 INJECTION, SOLUTION, CONCENTRATE INTRAVENOUS at 08:08

## 2022-08-18 RX ADMIN — NIMODIPINE 60 MG: 30 CAPSULE, LIQUID FILLED ORAL at 10:08

## 2022-08-18 RX ADMIN — LEVETIRACETAM 500 MG: 100 INJECTION, SOLUTION, CONCENTRATE INTRAVENOUS at 10:08

## 2022-08-18 RX ADMIN — NIMODIPINE 60 MG: 30 CAPSULE, LIQUID FILLED ORAL at 06:08

## 2022-08-18 NOTE — PROGRESS NOTES
Hospital Progress Note  [facility] Neurosurgery    Admit Date: 8/8/2022  Post-operative Day:    Hospital Day: 11    SUBJECTIVE:   Follow-up For:  SAH    Chief Complaint:  Chief Complaint   Patient presents with    Headache       Complaint of new onset headache, with pressure. Vomited x 1 prior to arrival.  Symptoms started at 0030       Interval History:  No new issues. Headaches and eye pain remain resolved.     History of Present Illness:  Patient is a 47yo male with no significant PMHX, who presented to ED this am with c/o pressure to the back of his head that began suddenly around 0030 this am, waking him from his sleep. He reports the pain from his head radiated down to his shoulders. He did take some medication and laid back down. Pt reports he felt better after this, but then the pain to his head soon returned. He reports 3 episodes of vomiting. He denies any previous neck/back sx, vision changes, or any other medical hx. Pt also denies ETOH, smoking, or drugs.      On arrival to the ED a CT head revealed SAH in the prepontine cistern and sylvian fissures with a small volume IVH. Dr. Buchanan was consulted on 8/8/2022 for evaluation and treatment recommendations.    8/10/2022: Patient underwent cerebral angio with Dr. Ivory. No evidence of aneurysm. Verapamil administered for vasospasm of basilar artery    8/17/2022: repeat cerebral angio with Dr. Ivory - no aneurysm, no vasospasm      Scheduled Meds:   levetiracetam IV  500 mg Intravenous Q12H    lisinopriL  10 mg Oral Daily    niMODipine  60 mg Oral Q4H     Continuous Infusions:   sodium chloride 0.9% 125 mL/hr at 08/17/22 0548    clevidipine Stopped (08/13/22 9879)     PRN Meds:albuterol-ipratropium, butalbital-acetaminophen-caffeine -40 mg, hydrALAZINE, HYDROcodone-acetaminophen, labetalol, morphine, ondansetron, sodium chloride 0.9%    Review of patient's allergies indicates:   Allergen Reactions    Shellfish containing  products      History reviewed. No pertinent past medical history.  Past Surgical History:   Procedure Laterality Date    APPENDECTOMY      FRACTURE SURGERY Right     pinky finger     ROS:  Review of Systems   Eyes: Positive for pain.   Neurological: Positive for headaches.     OBJECTIVE:     Vital Signs (Most Recent)  Temp: 98.6 °F (37 °C) (08/18/22 0800)  Pulse: 73 (08/18/22 1200)  Resp: 19 (08/18/22 1200)  BP: 125/70 (08/18/22 1200)  SpO2: 100 % (08/18/22 1200)    Vital Signs Range (Last 24H):  Temp:  [98 °F (36.7 °C)-98.6 °F (37 °C)]   Pulse:  [59-82]   Resp:  [9-32]   BP: ()/(53-86)   SpO2:  [94 %-100 %]     I & O (Last 24H):    Intake/Output Summary (Last 24 hours) at 8/18/2022 1422  Last data filed at 8/18/2022 0300  Gross per 24 hour   Intake 1580 ml   Output 750 ml   Net 830 ml     Physical Exam:  General: well developed, well nourished, no distress  Neurologic: GCS: Motor: 6/Verbal: 5/Eyes: 4 GCS Total: 15  Mental Status: Awake, Alert, Oriented x4  Cranial nerves: face symmetric, tongue midline, pupils equal, round, reactive to light with accomodation, extraocular muscles intact  Sensory: intact to light touch and pin prick throughout  Motor Strength: full strength upper and lower extremities  Gait: Steady  Head: normocephalic, atraumatic  Lungs:  normal respiratory effort  Heart: regular rate and rhythm  Abdomen: soft, non-tender non-distented; bowel sounds normal  Extremities: warm, well perfused and no cyanosis or edema,    Lines/Drains:       Peripheral IV - Single Lumen 08/08/22 0435 20 G Anterior;Proximal;Right Forearm (Active)   Site Assessment Clean;Dry;Intact 08/11/22 1000   Extremity Assessment Distal to IV No abnormal discoloration;No redness;No warmth;No swelling 08/11/22 1000   Line Status Infusing 08/11/22 1000   Dressing Status Dry;Intact;Clean 08/11/22 1000   Dressing Intervention Integrity maintained 08/11/22 1000   Number of days: 3            Peripheral IV - Single Lumen 08/08/22  "1200 20 G Anterior;Left Forearm (Active)   Site Assessment Clean;Dry;Intact 08/11/22 1000   Extremity Assessment Distal to IV No abnormal discoloration;No redness;No swelling;No warmth 08/11/22 1000   Line Status Saline locked 08/11/22 1000   Dressing Status Clean;Dry;Intact 08/11/22 1000   Dressing Intervention Integrity maintained 08/11/22 1000   Number of days: 2            Urethral Catheter 08/10/22 1454 Silicone 16 Fr. (Active)   Site Assessment Clean;Intact;Dry 08/11/22 0800   Collection Container Standard drainage bag 08/11/22 0800   Securement Method secured to top of thigh w/ adhesive device 08/11/22 0800   Catheter Care Performed yes 08/11/22 0800   Reason for Continuing Urinary Catheterization Critically ill in ICU and requiring hourly monitoring of intake/output 08/11/22 0800   CAUTI Prevention Bundle Securement Device in place with 1" slack;Intact seal between catheter & drainage tubing;Drainage bag/urimeter off the floor;Sheeting clip in use;No dependent loops or kinks;Drainage bag/urimeter not overfilled (<2/3 full);Drainage bag/urimeter below bladder 08/11/22 0800   Output (mL) 700 mL 08/11/22 0200   Number of days: 0            Sheath 08/10/22 1511 Right anterior (Active)   Insertion Site clean and dry;dressing intact;no hematoma 08/11/22 1100   Drainage Amount None 08/11/22 1100   Number of days: 0     Laboratory:  I have reviewed all pertinent lab results within the past 24 hours.  CBC:   Recent Labs   Lab 08/16/22  0510   WBC 8.2   RBC 5.20   HGB 13.5*   HCT 42.0      MCV 80.8   MCH 26.0*   MCHC 32.1*     BMP:   Recent Labs   Lab 08/16/22  0510      K 3.9   CO2 26   BUN 7.0*   CREATININE 0.88   CALCIUM 9.1       ASSESSMENT/PLAN:     Problem List Items Addressed This Visit        Neuro    SAH (subarachnoid hemorrhage)    Relevant Orders    Place sequential compression device      Other Visit Diagnoses     Acute nonintractable headache, unspecified headache type    -  Primary    "     PLAN  Discussed with Neurology - Continue HHH therapy  On Keppra, Nimotop  Discussed the above with patient  Please call with any decline        ANAND Baeza-SANJANA

## 2022-08-18 NOTE — PROGRESS NOTES
[facility]  Neurology  Progress Note    Patient Name: Canelo Blank  MRN: [id number]  Admission Date: 8/8/2022  Hospital Length of Stay: 10 days  Code Status: Full Code   Attending Provider: Wes Roger MD  Primary Care Physician: Primary Doctor No   Principal Problem:<principal problem not specified>    HPI:   Canelo Blank is a 46-year-old male with no significant medical history, presented to ED 8/8 with reports of severe HA.  He also complained of neck pain radiating down to his shoulders.  He took BC powder at home, but headache worsened.  Wife reports that he vomited prior to ED arrival.  CTh showed SAH with obstructing hydrocephalus.  CTA h/n showed some irregularity of the fetal originating left posterior cerebral artery off the left internal carotid with 2 small irregular saccular foci.  Initial plan was to transfer patient to a facility in [place] for interventional neurology evaluation, but there were no beds available for them to accept the patient.     Discussed with Dr Ivory, who stated he discussed case with neurosurgery and is planning to take patient to cath lab for embolization later today.       Overview/Hospital Course:  (8/10/22)  underwent cerebral angiogram ... no evidence of aneurysm, received verapamil for basilar artery vasospasm  (8/17/22) repeat cerebral angiogram ... no evidence of aneurysm or vasospasm          Subjective:     Interval History: Underwent repeat cerebral angiogram ... no aneurysm.  No new issues overnight.      Current Facility-Administered Medications   Medication Dose Route Frequency Provider Last Rate Last Admin    0.9%  NaCl infusion   Intravenous Continuous Celso Buchanan  mL/hr at 08/17/22 0548 New Bag at 08/17/22 0548    albuterol-ipratropium 2.5 mg-0.5 mg/3 mL nebulizer solution 3 mL  3 mL Nebulization Q1H PRN Rk Vickers Jr., MD        butalbital-acetaminophen-caffeine -40 mg per tablet 1 tablet  1 tablet  Oral Q4H PRN Armando Orta MD   1 tablet at 08/15/22 2144    clevidipine (CLEVIPREX) 25 mg/50 mL infusion  0-16 mg/hr Intravenous Continuous Ali Nura Jalloh,    Stopped at 08/13/22 1829    hydrALAZINE injection 10 mg  10 mg Intravenous Q2H PRN Jailyn Rodrigues MD   10 mg at 08/15/22 2111    HYDROcodone-acetaminophen 7.5-325 mg per tablet 1 tablet  1 tablet Oral Q6H PRN AMINATA Suero-BC   1 tablet at 08/12/22 1734    labetaloL injection 10 mg  10 mg Intravenous Q2H PRN Jailyn Rodrigues MD   10 mg at 08/10/22 0507    levETIRAcetam injection 500 mg  500 mg Intravenous Q12H Jailyn Rodrigues MD   500 mg at 08/18/22 1017    lisinopriL tablet 10 mg  10 mg Oral Daily Serafin Falcon MD   10 mg at 08/18/22 1017    morphine injection 2 mg  2 mg Intravenous Q4H PRN Jailyn Rodrigues MD   2 mg at 08/16/22 2255    niMODipine capsule 60 mg  60 mg Oral Q4H Serafin Rogers MD   60 mg at 08/18/22 1825    ondansetron injection 8 mg  8 mg Intravenous Q6H PRN Claudia Keys MD   8 mg at 08/15/22 1502    sodium chloride 0.9% flush 10 mL  10 mL Intravenous PRN Jailyn Rodrigues MD           Review of Systems   All other systems reviewed and are negative.    Objective:     Vital Signs (Most Recent):  Temp: 98.6 °F (37 °C) (08/18/22 1600)  Pulse: 93 (08/18/22 1845)  Resp: (!) 22 (08/18/22 1845)  BP: 118/85 (08/18/22 1830)  SpO2: 100 % (08/18/22 1845)   Vital Signs (24h Range):  Temp:  [98 °F (36.7 °C)-98.8 °F (37.1 °C)] 98.6 °F (37 °C)  Pulse:  [59-93] 93  Resp:  [9-23] 22  SpO2:  [94 %-100 %] 100 %  BP: ()/(53-85) 118/85     Weight: 68.9 kg (151 lb 14.4 oz)  Body mass index is 25.28 kg/m².    Physical Exam  Constitutional:       General: He is not in acute distress.     Appearance: Normal appearance. He is not ill-appearing.   Eyes:      General: No visual field deficit.     Extraocular Movements: Extraocular movements intact.      Pupils: Pupils are equal, round, and reactive to light.   Cardiovascular:       Rate and Rhythm: Normal rate and regular rhythm.   Pulmonary:      Effort: Pulmonary effort is normal.   Musculoskeletal:         General: Normal range of motion.   Skin:     General: Skin is warm and dry.   Neurological:      General: No focal deficit present.      Mental Status: He is alert and oriented to person, place, and time. Mental status is at baseline.      Cranial Nerves: No dysarthria or facial asymmetry.      Sensory: Sensation is intact.      Motor: Motor function is intact.      Coordination: Coordination is intact.   Psychiatric:         Mood and Affect: Mood normal.         Behavior: Behavior normal.         Significant Labs: BMP: No results for input(s): GLU, NA, K, CL, CO2, BUN, CREATININE, CALCIUM, MG in the last 48 hours.  CBC: No results for input(s): WBC, HGB, HCT, PLT in the last 48 hours.    Significant Imaging: I have reviewed all pertinent imaging results/findings within the past 24 hours.    Assessment and Plan:     SAH (subarachnoid hemorrhage)  SAH  No evidence of aneurysm seen during cerebral angiogram (8/10 or 8/17)  Basilar artery vasospasm s/p verapamil injection (8/10)    -Continue triple H therapy   -Continue frequent neuro checks  -continue NS IVF at 125mL/hr, PLEASE do not stop fluids without talking to neurology  -TCD MWF, TCD scheduled for tomorrow   -Continue Nimotop 60mg Q4 hours ... Do NOT skip doses  -Continue Keppra 500mg BID ... seizure precautions       The above findings, diagnostics, and treatment plan were discussed with Dr. Ivory, who is in agreement with the plan of care.             Kira Farrell, COURTNEYP  Neurology  [facility]

## 2022-08-18 NOTE — SUBJECTIVE & OBJECTIVE
Subjective:     Interval History: Underwent repeat cerebral angiogram ... no aneurysm.  No new issues overnight.      Current Facility-Administered Medications   Medication Dose Route Frequency Provider Last Rate Last Admin    0.9%  NaCl infusion   Intravenous Continuous Celso Buchanan  mL/hr at 08/17/22 0548 New Bag at 08/17/22 0548    albuterol-ipratropium 2.5 mg-0.5 mg/3 mL nebulizer solution 3 mL  3 mL Nebulization Q1H PRN Rk Vickers Jr., MD        butalbital-acetaminophen-caffeine -40 mg per tablet 1 tablet  1 tablet Oral Q4H PRN Armando Orta MD   1 tablet at 08/15/22 2144    clevidipine (CLEVIPREX) 25 mg/50 mL infusion  0-16 mg/hr Intravenous Continuous Rashaun Jalloh, DO   Stopped at 08/13/22 1829    hydrALAZINE injection 10 mg  10 mg Intravenous Q2H PRN Jailyn Rodrigues MD   10 mg at 08/15/22 2111    HYDROcodone-acetaminophen 7.5-325 mg per tablet 1 tablet  1 tablet Oral Q6H PRN KHALIF SueroP-BC   1 tablet at 08/12/22 1734    labetaloL injection 10 mg  10 mg Intravenous Q2H PRN Jailyn Rodrigues MD   10 mg at 08/10/22 0507    levETIRAcetam injection 500 mg  500 mg Intravenous Q12H Jailyn Rodrigues MD   500 mg at 08/18/22 1017    lisinopriL tablet 10 mg  10 mg Oral Daily Serafin Falcon MD   10 mg at 08/18/22 1017    morphine injection 2 mg  2 mg Intravenous Q4H PRN Jailyn Rodrigues MD   2 mg at 08/16/22 2255    niMODipine capsule 60 mg  60 mg Oral Q4H Serafin Rogers MD   60 mg at 08/18/22 1825    ondansetron injection 8 mg  8 mg Intravenous Q6H PRN Claudia Keys MD   8 mg at 08/15/22 1502    sodium chloride 0.9% flush 10 mL  10 mL Intravenous PRN Jailyn Rodrigues MD           Review of Systems   All other systems reviewed and are negative.    Objective:     Vital Signs (Most Recent):  Temp: 98.6 °F (37 °C) (08/18/22 1600)  Pulse: 93 (08/18/22 1845)  Resp: (!) 22 (08/18/22 1845)  BP: 118/85 (08/18/22 1830)  SpO2: 100 % (08/18/22 1845)   Vital Signs (24h Range):  Temp:   [98 °F (36.7 °C)-98.8 °F (37.1 °C)] 98.6 °F (37 °C)  Pulse:  [59-93] 93  Resp:  [9-23] 22  SpO2:  [94 %-100 %] 100 %  BP: ()/(53-85) 118/85     Weight: 68.9 kg (151 lb 14.4 oz)  Body mass index is 25.28 kg/m².    Physical Exam  Constitutional:       General: He is not in acute distress.     Appearance: Normal appearance. He is not ill-appearing.   Eyes:      General: No visual field deficit.     Extraocular Movements: Extraocular movements intact.      Pupils: Pupils are equal, round, and reactive to light.   Cardiovascular:      Rate and Rhythm: Normal rate and regular rhythm.   Pulmonary:      Effort: Pulmonary effort is normal.   Musculoskeletal:         General: Normal range of motion.   Skin:     General: Skin is warm and dry.   Neurological:      General: No focal deficit present.      Mental Status: He is alert and oriented to person, place, and time. Mental status is at baseline.      Cranial Nerves: No dysarthria or facial asymmetry.      Sensory: Sensation is intact.      Motor: Motor function is intact.      Coordination: Coordination is intact.   Psychiatric:         Mood and Affect: Mood normal.         Behavior: Behavior normal.         Significant Labs: BMP: No results for input(s): GLU, NA, K, CL, CO2, BUN, CREATININE, CALCIUM, MG in the last 48 hours.  CBC: No results for input(s): WBC, HGB, HCT, PLT in the last 48 hours.    Significant Imaging: I have reviewed all pertinent imaging results/findings within the past 24 hours.

## 2022-08-18 NOTE — PROGRESS NOTES
[facility]                       [address] [id number]    PATIENT NAME:       ROBERTO CARLOS BLANK   YOB: 1975  CSN:                [id number]   MRN:                [id number]  ADMIT DATE:         08/08/2022 04:22:00  PHYSICIAN:          RIAN Spear MD                            PROGRESS NOTE    DATE:      Mr. Blank is a 46-year-old gentleman who had a spontaneous subarachnoid hemorrhage   with mild obstructing hydrocephalus.  He was taken to the cath lab on   08/10/2022, by Dr. Ivory without any evidence of aneurysmal rupture.  He was   taken again to the cath lab yesterday, on 08/17/2022, again had cerebral   arteriography, which revealed no evidence of vascular abnormality.  The patient   is awake, alert, has no complaints of headache.  Not sure exactly what the plan   is from Neurology.  Will find out later today, I hope.    PHYSICAL EXAMINATION:  VITAL SIGNS:  Afebrile.  Blood pressure 102/71, heart rate 71, respirations 17.  HEENT:  Unremarkable.  CHEST:  Clear.  HEART:  Regular rate and rhythm.  ABDOMEN:  Benign.  EXTREMITIES:  No edema.  NEUROLOGICAL:  Awake, alert, completely intact.    LABORATORY DATA:  No lab work done today.    IMPRESSION:    1. Hospital day #10 for subarachnoid hemorrhage without evidence of aneurysmal   rupture seen on angiogram done 08/08/2022 and 08/17/2022.    2. Basal artery vasospasm, status post verapamil injection on admit.    PLAN:  Will await further direction from Neurology.  The patient looks stable   for downgrade to the floor.  Whether or not they want to continue him on the   amlodipine and the Keppra will await recommendations from our Neurology   colleagues.        ______________________________  G Hector Spear MD    GGG/AQS  DD:  08/18/2022  Time:  08:10AM  DT:  08/18/2022  Time:  08:30AM  Job #:  [id number]      PROGRESS NOTE

## 2022-08-18 NOTE — ASSESSMENT & PLAN NOTE
SAH  No evidence of aneurysm seen during cerebral angiogram (8/10 or 8/17)  Basilar artery vasospasm s/p verapamil injection (8/10)    -Continue triple H therapy   -Continue frequent neuro checks  -continue NS IVF at 125mL/hr, PLEASE do not stop fluids without talking to neurology  -TCD MWF, TCD scheduled for tomorrow   -Continue Nimotop 60mg Q4 hours ... Do NOT skip doses  -Continue Keppra 500mg BID ... seizure precautions       The above findings, diagnostics, and treatment plan were discussed with Dr. Ivory, who is in agreement with the plan of care.

## 2022-08-19 PROCEDURE — 94761 N-INVAS EAR/PLS OXIMETRY MLT: CPT

## 2022-08-19 PROCEDURE — 99232 SBSQ HOSP IP/OBS MODERATE 35: CPT | Mod: ,,, | Performed by: NURSE PRACTITIONER

## 2022-08-19 PROCEDURE — 25000003 PHARM REV CODE 250: Performed by: STUDENT IN AN ORGANIZED HEALTH CARE EDUCATION/TRAINING PROGRAM

## 2022-08-19 PROCEDURE — 99232 PR SUBSEQUENT HOSPITAL CARE,LEVL II: ICD-10-PCS | Mod: ,,, | Performed by: NURSE PRACTITIONER

## 2022-08-19 PROCEDURE — 63600175 PHARM REV CODE 636 W HCPCS: Performed by: STUDENT IN AN ORGANIZED HEALTH CARE EDUCATION/TRAINING PROGRAM

## 2022-08-19 PROCEDURE — 20000000 HC ICU ROOM

## 2022-08-19 PROCEDURE — 25000003 PHARM REV CODE 250: Performed by: INTERNAL MEDICINE

## 2022-08-19 RX ADMIN — NIMODIPINE 60 MG: 30 CAPSULE, LIQUID FILLED ORAL at 10:08

## 2022-08-19 RX ADMIN — NIMODIPINE 60 MG: 30 CAPSULE, LIQUID FILLED ORAL at 02:08

## 2022-08-19 RX ADMIN — LISINOPRIL 10 MG: 10 TABLET ORAL at 10:08

## 2022-08-19 RX ADMIN — NIMODIPINE 60 MG: 30 CAPSULE, LIQUID FILLED ORAL at 09:08

## 2022-08-19 RX ADMIN — LEVETIRACETAM 500 MG: 100 INJECTION, SOLUTION, CONCENTRATE INTRAVENOUS at 08:08

## 2022-08-19 RX ADMIN — LEVETIRACETAM 500 MG: 100 INJECTION, SOLUTION, CONCENTRATE INTRAVENOUS at 10:08

## 2022-08-19 RX ADMIN — NIMODIPINE 60 MG: 30 CAPSULE, LIQUID FILLED ORAL at 06:08

## 2022-08-19 RX ADMIN — NIMODIPINE 60 MG: 30 CAPSULE, LIQUID FILLED ORAL at 05:08

## 2022-08-19 NOTE — PROGRESS NOTES
"Inpatient Nutrition Evaluation    Admit Date: 8/8/2022   Length of Stay: 11 days  Nutrition Recommendation/Prescription     Continue current diet as tolerated.    Nutrition Assessment     Chart Review    Reason Seen: follow-up    Diagnosis:  SAH    Relevant Medical History: no nutrition related hx noted    Nutrition-Related Medications: NS @ 125ml/hr    Nutrition-Related Labs:  No new labs    Diet Order: Diet Adult Regular  Oral Supplement Order: none at this time  Appetite/Oral Intake: good/% of meals  Factors Affecting Nutritional Intake: none identified at this time  Food/Advent/Cultural Preferences: none reported    Skin Integrity: intact  Wound(s):   incision noted    Comments    8/13/22: pt reports good appetite with no n/v/d/c. UBW reported 71.8 kg (158 lb) with suspected weight loss, however unsure of when weight loss occurred. No previous weights in EMR; per pt reported UBW, pt with possible 4% weight loss in unknown time frame. Will continue to monitor.    8/19/22: Pt continues with good po intake of meals.    Anthropometrics    Height: 5' 5" (165.1 cm) Height Method: Stated  Weight: 68.9 kg (151 lb 14.4 oz) (08/08/22 0419) Weight Method: Standard Scale  BMI (Calculated): 25.3  BMI Classification: overweight (BMI 25-29.9)        Ideal Body Weight (IBW), Male: 136 lb  % Ideal Body Weight, Male (lb): 111.89 %                   Wt Readings from Last 5 Encounters:   08/08/22 68.9 kg (151 lb 14.4 oz)     Weight Change(s) Since Admission:  Admit Weight: 68.9 kg (151 lb 14.4 oz) (08/08/22 0419)    Patient Education    Not applicable.    Monitoring & Evaluation     Dietitian will monitor food and beverage intake.  Nutrition Risk/Follow-Up: low (follow-up in 5-7 days)  Patients assigned 'low nutrition risk' status do not qualify for a full nutritional assessment but will be monitored and re-evaluated in a 5-7 day time period.    "

## 2022-08-19 NOTE — PROGRESS NOTES
[facility]                       [address] [address]    PATIENT NAME:       ROBERTO CARLOS CHANCE   YOB: 1975  CSN:                [id number]   MRN:                [id number]  ADMIT DATE:         08/08/2022 04:22:00  PHYSICIAN:          RIAN Spear MD                            PROGRESS NOTE    DATE:      A 46-year-old gentleman who had a spontaneous subarachnoid hemorrhage with some   mild obstructing hydrocephalus.  He was taken to the cath lab by Dr. Ivory   without evidence of aneurysmal rupture.  He also was taken back to the cath lab   on 08/17/2022 and again had no origin of the subarachnoid hemorrhage.  He will   complete 14 days of triple-H therapy.  This morning, he is awake, alert, has no   complaints.  No headache.    PHYSICAL EXAM:  VITAL SIGNS:  Temp is 97.9, blood pressure 120/75, heart rate   63, respirations 12.  HEENT:  Unremarkable.    CHEST:  Clear.  HEART:  Regular in rhythm.  ABDOMEN:  Benign.   EXTREMITIES:  No edema.  NEUROLOGIC:  Patient is awake, alert, and intact.    LABORATORY DATA:  There was no blood work drawn today.    IMPRESSION:  Hospital day #11 for subarachnoid hemorrhage without evidence of   aneurysmal rupture seen on angiograms done 08/08/2022 and 08/17/2022.    PLAN:  Continue recommendations from Neurology.  They continue to want him   monitored in Intensive Care Unit.        ______________________________  G MD JALEESA Esteban/BEBETOS  DD:  08/19/2022  Time:  08:04AM  DT:  08/19/2022  Time:  08:15AM  Job #:  [id number]      PROGRESS NOTE

## 2022-08-19 NOTE — PROGRESS NOTES
Hospital Progress Note  [facility] Neurosurgery    Admit Date: 8/8/2022  Post-operative Day:    Hospital Day: 12    SUBJECTIVE:   Follow-up For:  SAH    Chief Complaint:  Chief Complaint   Patient presents with    Headache       Complaint of new onset headache, with pressure. Vomited x 1 prior to arrival.  Symptoms started at 0030       Interval History:  No new issues. Headaches and eye pain remain resolved. TCDs this morning with no evidence of vasospasm. No new issues    History of Present Illness:  Patient is a 47yo male with no significant PMHX, who presented to ED this am with c/o pressure to the back of his head that began suddenly around 0030 this am, waking him from his sleep. He reports the pain from his head radiated down to his shoulders. He did take some medication and laid back down. Pt reports he felt better after this, but then the pain to his head soon returned. He reports 3 episodes of vomiting. He denies any previous neck/back sx, vision changes, or any other medical hx. Pt also denies ETOH, smoking, or drugs.      On arrival to the ED a CT head revealed SAH in the prepontine cistern and sylvian fissures with a small volume IVH. Dr. Buchanan was consulted on 8/8/2022 for evaluation and treatment recommendations.    8/10/2022: Patient underwent cerebral angio with Dr. Ivory. No evidence of aneurysm. Verapamil administered for vasospasm of basilar artery    8/17/2022: repeat cerebral angio with Dr. Ivory - no aneurysm, no vasospasm      Scheduled Meds:   levetiracetam IV  500 mg Intravenous Q12H    lisinopriL  10 mg Oral Daily    niMODipine  60 mg Oral Q4H     Continuous Infusions:   sodium chloride 0.9% 125 mL/hr at 08/18/22 2018    clevidipine Stopped (08/13/22 1829)     PRN Meds:albuterol-ipratropium, butalbital-acetaminophen-caffeine -40 mg, hydrALAZINE, HYDROcodone-acetaminophen, labetalol, morphine, ondansetron, sodium chloride 0.9%    Review of patient's  allergies indicates:   Allergen Reactions    Shellfish containing products      History reviewed. No pertinent past medical history.  Past Surgical History:   Procedure Laterality Date    APPENDECTOMY      FRACTURE SURGERY Right     pinky finger     ROS:  Review of Systems   Eyes: Positive for pain.   Neurological: Positive for headaches.     OBJECTIVE:     Vital Signs (Most Recent)  Temp: 97.9 °F (36.6 °C) (08/19/22 0400)  Pulse: 63 (08/19/22 0645)  Resp: 12 (08/19/22 0645)  BP: 120/75 (08/19/22 0630)  SpO2: 99 % (08/19/22 0900)    Vital Signs Range (Last 24H):  Temp:  [97.9 °F (36.6 °C)-98.8 °F (37.1 °C)]   Pulse:  [56-93]   Resp:  [10-38]   BP: ()/(51-86)   SpO2:  [97 %-100 %]     I & O (Last 24H):    Intake/Output Summary (Last 24 hours) at 8/19/2022 1108  Last data filed at 8/19/2022 0200  Gross per 24 hour   Intake 1730 ml   Output 1350 ml   Net 380 ml     Physical Exam:  General: well developed, well nourished, no distress  Neurologic: GCS: Motor: 6/Verbal: 5/Eyes: 4 GCS Total: 15  Mental Status: Awake, Alert, Oriented x4  Cranial nerves: face symmetric, tongue midline, pupils equal, round, reactive to light with accomodation, extraocular muscles intact  Sensory: intact to light touch and pin prick throughout  Motor Strength: full strength upper and lower extremities  Gait: Steady  Head: normocephalic, atraumatic  Lungs:  normal respiratory effort  Heart: regular rate and rhythm  Abdomen: soft, non-tender non-distented; bowel sounds normal  Extremities: warm, well perfused and no cyanosis or edema,    Lines/Drains:       Peripheral IV - Single Lumen 08/08/22 0435 20 G Anterior;Proximal;Right Forearm (Active)   Site Assessment Clean;Dry;Intact 08/11/22 1000   Extremity Assessment Distal to IV No abnormal discoloration;No redness;No warmth;No swelling 08/11/22 1000   Line Status Infusing 08/11/22 1000   Dressing Status Dry;Intact;Clean 08/11/22 1000   Dressing Intervention Integrity maintained 08/11/22  "1000   Number of days: 3            Peripheral IV - Single Lumen 08/08/22 1200 20 G Anterior;Left Forearm (Active)   Site Assessment Clean;Dry;Intact 08/11/22 1000   Extremity Assessment Distal to IV No abnormal discoloration;No redness;No swelling;No warmth 08/11/22 1000   Line Status Saline locked 08/11/22 1000   Dressing Status Clean;Dry;Intact 08/11/22 1000   Dressing Intervention Integrity maintained 08/11/22 1000   Number of days: 2            Urethral Catheter 08/10/22 1454 Silicone 16 Fr. (Active)   Site Assessment Clean;Intact;Dry 08/11/22 0800   Collection Container Standard drainage bag 08/11/22 0800   Securement Method secured to top of thigh w/ adhesive device 08/11/22 0800   Catheter Care Performed yes 08/11/22 0800   Reason for Continuing Urinary Catheterization Critically ill in ICU and requiring hourly monitoring of intake/output 08/11/22 0800   CAUTI Prevention Bundle Securement Device in place with 1" slack;Intact seal between catheter & drainage tubing;Drainage bag/urimeter off the floor;Sheeting clip in use;No dependent loops or kinks;Drainage bag/urimeter not overfilled (<2/3 full);Drainage bag/urimeter below bladder 08/11/22 0800   Output (mL) 700 mL 08/11/22 0200   Number of days: 0            Sheath 08/10/22 1511 Right anterior (Active)   Insertion Site clean and dry;dressing intact;no hematoma 08/11/22 1100   Drainage Amount None 08/11/22 1100   Number of days: 0     Laboratory:  I have reviewed all pertinent lab results within the past 24 hours.  CBC:   Recent Labs   Lab 08/16/22  0510   WBC 8.2   RBC 5.20   HGB 13.5*   HCT 42.0      MCV 80.8   MCH 26.0*   MCHC 32.1*     BMP:   Recent Labs   Lab 08/16/22  0510      K 3.9   CO2 26   BUN 7.0*   CREATININE 0.88   CALCIUM 9.1     Radiology:  US TRANSCRAN DOPPLER INTRACRAN ARTR COMP     CLINICAL HISTORY:  SAH;;     TECHNIQUE:  Vascular interrogation performed in routine fashion utilizing color Doppler imaging with spectral " analysis. Peak systolic velocities were measured and compared to reference normal velocities of [place] arteries as per Miguel Angel.     COMPARISON:  08/17/2022     FINDINGS:  Arterial spectral waveforms are identified. The following peak systolic velocities were obtained of the intracranial vessels:     Right MCA: 87.1 cm/sec     Right BLAYNE: 59.5 cm/sec     Right PCA: 31 cm/sec     Left MCA: 91.9 cm/sec     Left BLAYNE: 47.8 cm/sec     Left PCA: 47.8 cm/sec     Impression:     Velocities remain within normal limits.        Electronically signed by: Bri Vicente  Date:                                            08/19/2022  Time:                                           10:30      ASSESSMENT/PLAN:     Problem List Items Addressed This Visit        Neuro    SAH (subarachnoid hemorrhage)    Relevant Orders    Place sequential compression device      Other Visit Diagnoses     Acute nonintractable headache, unspecified headache type    -  Primary        PLAN  Continue HHH therapy  On Keppra, Nimotop  Discussed above with patient   Please call with any decline         JOHN Baeza

## 2022-08-20 PROCEDURE — 25000003 PHARM REV CODE 250: Performed by: INTERNAL MEDICINE

## 2022-08-20 PROCEDURE — 63600175 PHARM REV CODE 636 W HCPCS: Performed by: STUDENT IN AN ORGANIZED HEALTH CARE EDUCATION/TRAINING PROGRAM

## 2022-08-20 PROCEDURE — 25000003 PHARM REV CODE 250: Performed by: STUDENT IN AN ORGANIZED HEALTH CARE EDUCATION/TRAINING PROGRAM

## 2022-08-20 PROCEDURE — 25000003 PHARM REV CODE 250: Performed by: NEUROLOGICAL SURGERY

## 2022-08-20 PROCEDURE — 20000000 HC ICU ROOM

## 2022-08-20 RX ORDER — MAG HYDROX/ALUMINUM HYD/SIMETH 200-200-20
30 SUSPENSION, ORAL (FINAL DOSE FORM) ORAL EVERY 6 HOURS PRN
Status: DISCONTINUED | OUTPATIENT
Start: 2022-08-20 | End: 2022-08-22 | Stop reason: HOSPADM

## 2022-08-20 RX ADMIN — NIMODIPINE 60 MG: 30 CAPSULE, LIQUID FILLED ORAL at 06:08

## 2022-08-20 RX ADMIN — SODIUM CHLORIDE: 9 INJECTION, SOLUTION INTRAVENOUS at 08:08

## 2022-08-20 RX ADMIN — NIMODIPINE 60 MG: 30 CAPSULE, LIQUID FILLED ORAL at 01:08

## 2022-08-20 RX ADMIN — LISINOPRIL 10 MG: 10 TABLET ORAL at 10:08

## 2022-08-20 RX ADMIN — NIMODIPINE 60 MG: 30 CAPSULE, LIQUID FILLED ORAL at 09:08

## 2022-08-20 RX ADMIN — LEVETIRACETAM 500 MG: 100 INJECTION, SOLUTION, CONCENTRATE INTRAVENOUS at 08:08

## 2022-08-20 RX ADMIN — LEVETIRACETAM 500 MG: 100 INJECTION, SOLUTION, CONCENTRATE INTRAVENOUS at 10:08

## 2022-08-20 RX ADMIN — ALUMINUM HYDROXIDE, MAGNESIUM HYDROXIDE, AND SIMETHICONE 30 ML: 200; 200; 20 SUSPENSION ORAL at 08:08

## 2022-08-20 RX ADMIN — NIMODIPINE 60 MG: 30 CAPSULE, LIQUID FILLED ORAL at 02:08

## 2022-08-20 RX ADMIN — NIMODIPINE 60 MG: 30 CAPSULE, LIQUID FILLED ORAL at 05:08

## 2022-08-20 RX ADMIN — NIMODIPINE 60 MG: 30 CAPSULE, LIQUID FILLED ORAL at 10:08

## 2022-08-20 NOTE — PROGRESS NOTES
Patient not seen today.  Neurosurgery team was asking for clarifications regarding neurology recommendations.     Continue SAH protocol ... per Dr Ivory (discussed case on 8/18)  Monday 8/22 will be day 14 following onset of symptoms.

## 2022-08-20 NOTE — PROGRESS NOTES
Pulmonary & Critical Care Medicine   Progress Note      Presenting History/HPI:  Patient is a 46-year-old male who presented with a spontaneous subarachnoid hemorrhage with mild obstructive hydrocephalus.  The patient was taken back to the cath lab on 08/08 with Interventional Neurology with study demonstrating no evidence of aneurysmal rupture.  The patient was taken back again to the cath lab on 08/17 again without any signs or origin of subarachnoid hemorrhage.  The patient is currently in the ICU to finish triple H therapy which will be complete on 08/22.      Interval History:  -no major issues or changes overnight   -patient denies any complaints of headache nausea vomiting blurred vision double vision for imbalance      Scheduled Medications:    levetiracetam IV  500 mg Intravenous Q12H    lisinopriL  10 mg Oral Daily    niMODipine  60 mg Oral Q4H       PRN Medications:   albuterol-ipratropium, butalbital-acetaminophen-caffeine -40 mg, hydrALAZINE, HYDROcodone-acetaminophen, labetalol, morphine, ondansetron, sodium chloride 0.9%      Infusions:     sodium chloride 0.9% 125 mL/hr at 08/18/22 2018    clevidipine Stopped (08/13/22 1829)         Fluid Balance:     Intake/Output Summary (Last 24 hours) at 8/20/2022 1400  Last data filed at 8/20/2022 0600  Gross per 24 hour   Intake 2215 ml   Output 500 ml   Net 1715 ml           Vital Signs:   Vitals:    08/20/22 1230   BP:    Pulse: 70   Resp: 15   Temp:          Physical Exam  Vitals and nursing note reviewed.   Constitutional:       General: He is not in acute distress.     Appearance: Normal appearance. He is not ill-appearing or toxic-appearing.   HENT:      Head: Normocephalic and atraumatic.      Right Ear: External ear normal.      Left Ear: External ear normal.      Nose: Nose normal.      Mouth/Throat:      Mouth: Mucous membranes are moist.      Pharynx: Oropharynx is clear. No oropharyngeal exudate or posterior oropharyngeal erythema.    Eyes:      General: No scleral icterus.     Extraocular Movements: Extraocular movements intact.      Conjunctiva/sclera: Conjunctivae normal.      Pupils: Pupils are equal, round, and reactive to light.   Neck:      Vascular: No carotid bruit.   Cardiovascular:      Rate and Rhythm: Normal rate and regular rhythm.      Pulses: Normal pulses.      Heart sounds: Normal heart sounds. No murmur heard.    No friction rub. No gallop.   Pulmonary:      Effort: Pulmonary effort is normal. No respiratory distress.      Breath sounds: Normal breath sounds. No wheezing, rhonchi or rales.   Abdominal:      General: Abdomen is flat. Bowel sounds are normal. There is no distension.      Palpations: Abdomen is soft.      Tenderness: There is no abdominal tenderness. There is no guarding or rebound.   Genitourinary:     Comments: deferred  Musculoskeletal:         General: No swelling or deformity. Normal range of motion.      Cervical back: Normal range of motion and neck supple. No rigidity or tenderness.   Lymphadenopathy:      Cervical: No cervical adenopathy.   Skin:     General: Skin is warm and dry.      Capillary Refill: Capillary refill takes less than 2 seconds.      Coloration: Skin is not jaundiced.      Findings: No bruising, lesion or rash.   Neurological:      General: No focal deficit present.      Mental Status: He is alert.      Sensory: No sensory deficit.      Motor: No weakness.   Psychiatric:         Mood and Affect: Mood normal.         Laboratory Studies:   No results for input(s): PH, PCO2, PO2, HCO3, POCSATURATED, BE in the last 24 hours.  No results for input(s): WBC, RBC, HGB, HCT, PLT, MCV, MCH, MCHC in the last 24 hours.  No results for input(s): GLUCOSE, NA, K, CL, CO2, BUN, CREATININE, MG in the last 24 hours.    Invalid input(s):  CALCIUM      Microbiology Data:   Microbiology Results (last 7 days)     ** No results found for the last 168 hours. **            Imaging:   US Transcran Doppler  Intracran Artr Comp  Narrative: EXAMINATION:  US TRANSCRAN DOPPLER INTRACRAN ARTR COMP    CLINICAL HISTORY:  SAH;;    TECHNIQUE:  Vascular interrogation performed in routine fashion utilizing color Doppler imaging with spectral analysis. Peak systolic velocities were measured and compared to reference normal velocities of [place] of [place] arteries as per Miguel Angel.    COMPARISON:  08/17/2022    FINDINGS:  Arterial spectral waveforms are identified. The following peak systolic velocities were obtained of the intracranial vessels:    Right MCA: 87.1 cm/sec    Right BLAYNE: 59.5 cm/sec    Right PCA: 31 cm/sec    Left MCA: 91.9 cm/sec    Left BLAYNE: 47.8 cm/sec    Left PCA: 47.8 cm/sec  Impression: Velocities remain within normal limits.    Electronically signed by: Bri Vicente  Date:    08/19/2022  Time:    10:30          Assessment and Plan    Assessment:  Hospital day 12 for subarachnoid hemorrhage without evidence of aneurysmal rupture seen on angiograms performed on 08/08 and 8/17 by Interventional Neurology          Plan:  -finish triple H therapy for 14 days to be complete on 08/20 to in the patient can most likely be discharged to home with follow-up with Neurology        Serafin Falcon MD  8/20/2022  Pulmonology/Critical Care

## 2022-08-21 PROCEDURE — 20000000 HC ICU ROOM

## 2022-08-21 PROCEDURE — 25000003 PHARM REV CODE 250: Performed by: INTERNAL MEDICINE

## 2022-08-21 PROCEDURE — 25000003 PHARM REV CODE 250: Performed by: STUDENT IN AN ORGANIZED HEALTH CARE EDUCATION/TRAINING PROGRAM

## 2022-08-21 PROCEDURE — 63600175 PHARM REV CODE 636 W HCPCS: Performed by: STUDENT IN AN ORGANIZED HEALTH CARE EDUCATION/TRAINING PROGRAM

## 2022-08-21 RX ADMIN — NIMODIPINE 60 MG: 30 CAPSULE, LIQUID FILLED ORAL at 09:08

## 2022-08-21 RX ADMIN — NIMODIPINE 60 MG: 30 CAPSULE, LIQUID FILLED ORAL at 05:08

## 2022-08-21 RX ADMIN — NIMODIPINE 60 MG: 30 CAPSULE, LIQUID FILLED ORAL at 02:08

## 2022-08-21 RX ADMIN — NIMODIPINE 60 MG: 30 CAPSULE, LIQUID FILLED ORAL at 06:08

## 2022-08-21 RX ADMIN — LEVETIRACETAM 500 MG: 100 INJECTION, SOLUTION, CONCENTRATE INTRAVENOUS at 09:08

## 2022-08-21 RX ADMIN — LISINOPRIL 10 MG: 10 TABLET ORAL at 09:08

## 2022-08-21 RX ADMIN — LEVETIRACETAM 500 MG: 100 INJECTION, SOLUTION, CONCENTRATE INTRAVENOUS at 08:08

## 2022-08-21 NOTE — PROGRESS NOTES
Pulmonary & Critical Care Medicine   Progress Note      Presenting History/HPI:  Patient is a 46-year-old male who presented with a spontaneous subarachnoid hemorrhage with mild obstructive hydrocephalus.  The patient was taken back to the cath lab on 08/08 with Interventional Neurology with study demonstrating no evidence of aneurysmal rupture.  The patient was taken back again to the cath lab on 08/17 again without any signs or origin of subarachnoid hemorrhage.  The patient is currently in the ICU to finish triple H therapy which will be complete on 08/22.      Interval History:  -no major issues or changes overnight   -patient denies any complaints of headache nausea vomiting blurred vision double vision for imbalance  -he was seen standing up in his room walking around      Scheduled Medications:    levetiracetam IV  500 mg Intravenous Q12H    lisinopriL  10 mg Oral Daily    niMODipine  60 mg Oral Q4H       PRN Medications:   albuterol-ipratropium, aluminum-magnesium hydroxide-simethicone, butalbital-acetaminophen-caffeine -40 mg, hydrALAZINE, HYDROcodone-acetaminophen, labetalol, morphine, ondansetron, sodium chloride 0.9%      Infusions:     sodium chloride 0.9% 125 mL/hr at 08/20/22 2006    clevidipine Stopped (08/13/22 1829)         Fluid Balance:     Intake/Output Summary (Last 24 hours) at 8/21/2022 1509  Last data filed at 8/21/2022 0600  Gross per 24 hour   Intake 1204 ml   Output --   Net 1204 ml           Vital Signs:   Vitals:    08/21/22 1400   BP: 117/83   Pulse: 73   Resp: 13   Temp:          Physical Exam  Vitals and nursing note reviewed.   Constitutional:       General: He is not in acute distress.     Appearance: Normal appearance. He is not ill-appearing or toxic-appearing.   HENT:      Head: Normocephalic and atraumatic.      Right Ear: External ear normal.      Left Ear: External ear normal.      Nose: Nose normal.      Mouth/Throat:      Mouth: Mucous membranes are moist.       Pharynx: Oropharynx is clear. No oropharyngeal exudate or posterior oropharyngeal erythema.   Eyes:      General: No scleral icterus.     Extraocular Movements: Extraocular movements intact.      Conjunctiva/sclera: Conjunctivae normal.      Pupils: Pupils are equal, round, and reactive to light.   Neck:      Vascular: No carotid bruit.   Cardiovascular:      Rate and Rhythm: Normal rate and regular rhythm.      Pulses: Normal pulses.      Heart sounds: Normal heart sounds. No murmur heard.    No friction rub. No gallop.   Pulmonary:      Effort: Pulmonary effort is normal. No respiratory distress.      Breath sounds: Normal breath sounds. No wheezing, rhonchi or rales.   Abdominal:      General: Abdomen is flat. Bowel sounds are normal. There is no distension.      Palpations: Abdomen is soft.      Tenderness: There is no abdominal tenderness. There is no guarding or rebound.   Genitourinary:     Comments: deferred  Musculoskeletal:         General: No swelling or deformity. Normal range of motion.      Cervical back: Normal range of motion and neck supple. No rigidity or tenderness.   Lymphadenopathy:      Cervical: No cervical adenopathy.   Skin:     General: Skin is warm and dry.      Capillary Refill: Capillary refill takes less than 2 seconds.      Coloration: Skin is not jaundiced.      Findings: No bruising, lesion or rash.   Neurological:      General: No focal deficit present.      Mental Status: He is alert.      Sensory: No sensory deficit.      Motor: No weakness.   Psychiatric:         Mood and Affect: Mood normal.         Laboratory Studies:   No results for input(s): PH, PCO2, PO2, HCO3, POCSATURATED, BE in the last 24 hours.  No results for input(s): WBC, RBC, HGB, HCT, PLT, MCV, MCH, MCHC in the last 24 hours.  No results for input(s): GLUCOSE, NA, K, CL, CO2, BUN, CREATININE, MG in the last 24 hours.    Invalid input(s):  CALCIUM      Microbiology Data:   Microbiology Results (last 7 days)      ** No results found for the last 168 hours. **            Imaging:   US Transcran Doppler Intracran Artr Comp  Narrative: EXAMINATION:  US TRANSCRAN DOPPLER INTRACRAN ARTR COMP    CLINICAL HISTORY:  SAH;;    TECHNIQUE:  Vascular interrogation performed in routine fashion utilizing color Doppler imaging with spectral analysis. Peak systolic velocities were measured and compared to reference normal velocities of [place] arteries as per Miguel Angel.    COMPARISON:  08/17/2022    FINDINGS:  Arterial spectral waveforms are identified. The following peak systolic velocities were obtained of the intracranial vessels:    Right MCA: 87.1 cm/sec    Right BLAYNE: 59.5 cm/sec    Right PCA: 31 cm/sec    Left MCA: 91.9 cm/sec    Left BLAYNE: 47.8 cm/sec    Left PCA: 47.8 cm/sec  Impression: Velocities remain within normal limits.    Electronically signed by: Bri Vicente  Date:    08/19/2022  Time:    10:30          Assessment and Plan    Assessment:  Hospital day 13 for subarachnoid hemorrhage without evidence of aneurysmal rupture seen on angiograms performed on 08/08 and 8/17 by Interventional Neurology          Plan:  -finish triple H therapy for 14 days to be complete on 08/22 ,the patient can most likely be discharged to home with follow-up with Neurology        Serafin Falcon MD  8/21/2022  Pulmonology/Critical Care

## 2022-08-22 VITALS
HEART RATE: 70 BPM | TEMPERATURE: 98 F | OXYGEN SATURATION: 99 % | HEIGHT: 65 IN | BODY MASS INDEX: 25.3 KG/M2 | RESPIRATION RATE: 18 BRPM | SYSTOLIC BLOOD PRESSURE: 104 MMHG | DIASTOLIC BLOOD PRESSURE: 75 MMHG | WEIGHT: 151.88 LBS

## 2022-08-22 PROCEDURE — 63600175 PHARM REV CODE 636 W HCPCS: Performed by: STUDENT IN AN ORGANIZED HEALTH CARE EDUCATION/TRAINING PROGRAM

## 2022-08-22 PROCEDURE — 25000003 PHARM REV CODE 250: Performed by: INTERNAL MEDICINE

## 2022-08-22 PROCEDURE — 25000003 PHARM REV CODE 250: Performed by: STUDENT IN AN ORGANIZED HEALTH CARE EDUCATION/TRAINING PROGRAM

## 2022-08-22 RX ORDER — LEVETIRACETAM 500 MG/1
500 TABLET ORAL 2 TIMES DAILY
Qty: 60 TABLET | Refills: 0 | Status: SHIPPED | OUTPATIENT
Start: 2022-08-22 | End: 2023-01-13

## 2022-08-22 RX ORDER — NIMODIPINE 30 MG/1
60 CAPSULE, LIQUID FILLED ORAL EVERY 4 HOURS
Qty: 84 CAPSULE | Refills: 0 | Status: SHIPPED | OUTPATIENT
Start: 2022-08-22 | End: 2023-01-13

## 2022-08-22 RX ADMIN — NIMODIPINE 60 MG: 30 CAPSULE, LIQUID FILLED ORAL at 10:08

## 2022-08-22 RX ADMIN — NIMODIPINE 60 MG: 30 CAPSULE, LIQUID FILLED ORAL at 06:08

## 2022-08-22 RX ADMIN — LEVETIRACETAM 500 MG: 100 INJECTION, SOLUTION, CONCENTRATE INTRAVENOUS at 09:08

## 2022-08-22 RX ADMIN — NIMODIPINE 60 MG: 30 CAPSULE, LIQUID FILLED ORAL at 02:08

## 2022-08-22 RX ADMIN — LISINOPRIL 10 MG: 10 TABLET ORAL at 10:08

## 2022-08-22 NOTE — DISCHARGE SUMMARY
[facility]  Pulmonology  Discharge Summary      Patient Name: Canelo Blank  MRN: [id number]  Admission Date: 8/8/2022  Hospital Length of Stay: 14 days  Discharge Date and Time: No discharge date for patient encounter.  Attending Physician: Wes Roger MD   Discharging Provider: NE Bonner  Primary Care Provider: Primary Doctor No    HPI:  Patient is a 46-year-old male who presented with a spontaneous subarachnoid hemorrhage with mild obstructive hydrocephalus.  The patient was taken back to the cath lab on 08/08 with Interventional Neurology with study demonstrating no evidence of aneurysmal rupture.  The patient was taken back again to the cath lab on 08/17 again without any signs or origin of subarachnoid hemorrhage.      Indwelling Lines/Drains at Time of Discharge:   Lines/Drains/Airways     None                 Hospital Course:  Patient is a 46-year-old male who presented with a spontaneous subarachnoid hemorrhage with mild obstructive hydrocephalus.  The patient was taken back to the cath lab on 08/08 with Interventional Neurology with study demonstrating no evidence of aneurysmal rupture.  The patient was taken back again to the cath lab on 08/17 again without any signs or origin of subarachnoid hemorrhage.      Goals of Care Treatment Preferences:  Code Status: Full Code      Consults (From admission, onward)        Status Ordering Provider     IP CONSULT TO NEURO-INTERVENTIONAL  Once        Provider:  Giovanni Pérez MD    Completed GIOVANNI PÉREZ     IP CONSULT TO NEURO-INTERVENTIONAL  Once        Provider:  (Not yet assigned)    CELSO Wheatley     Inpatient consult to Social Work/Case Management  Once        Provider:  (Not yet assigned)    Completed RADHA KYLE     Inpatient consult to Neurosurgery  Once        Provider:  Celso Buchanan MD    Completed KANIKA SARGENT          Significant Labs:  All pertinent labs within the past 24  hours have been reviewed.    Significant Imaging:  I have reviewed all pertinent imaging results/findings within the past 24 hours.    Pending Diagnostic Studies:     Procedure Component Value Units Date/Time    US Transcran Doppler Intracran Artr Comp [[id number]] Resulted: 08/22/22 0652    Order Status: Sent Lab Status: In process Updated: 08/22/22 0751        Final Active Diagnoses:    Diagnosis Date Noted POA    PRINCIPAL PROBLEM:  SAH (subarachnoid hemorrhage) [I60.9] 08/10/2022 Yes      Problems Resolved During this Admission:       Discharged Condition: stable    Disposition: home    Follow Up:   Follow-up Information     Darek Ivory MD Follow up.    Specialties: Neurology, Interventional Neurology  Contact information:  [address] [place] [address]  [address]  [facility] [address]  [phone]                       Patient Instructions:     Instructions given to patient by RN    Medications:  Reconciled Home Medications:      Medication List      START taking these medications    levETIRAcetam 500 MG Tab  Commonly known as: KEPPRA  Take 1 tablet (500 mg total) by mouth 2 (two) times daily.     niMODipine 30 MG Cap  Commonly known as: NIMOTOP  Take 2 capsules (60 mg total) by mouth every 4 (four) hours. for 7 days            NE Bonner  Pulmonology  [facility]

## 2022-08-23 ENCOUNTER — PATIENT OUTREACH (OUTPATIENT)
Dept: ADMINISTRATIVE | Facility: CLINIC | Age: 47
End: 2022-08-23
Payer: COMMERCIAL

## 2022-08-23 LAB
POCT GLUCOSE: 107 MG/DL (ref 70–110)
POCT GLUCOSE: 116 MG/DL (ref 70–110)

## 2022-08-23 NOTE — PROGRESS NOTES
C3 nurse spoke with Canelo Blank for a TCC post hospital discharge follow up call. The patient has a scheduled HOSFU appointment with Bing OLIVIA, Neuroscience center [facility] on 9/26/22 @ 9:00am.

## 2022-09-26 ENCOUNTER — OFFICE VISIT (OUTPATIENT)
Dept: NEUROLOGY | Facility: CLINIC | Age: 47
End: 2022-09-26
Payer: MEDICAID

## 2022-09-26 VITALS
DIASTOLIC BLOOD PRESSURE: 77 MMHG | HEIGHT: 65 IN | WEIGHT: 151 LBS | BODY MASS INDEX: 25.16 KG/M2 | SYSTOLIC BLOOD PRESSURE: 129 MMHG

## 2022-09-26 DIAGNOSIS — I60.9 SUBARACHNOID HEMORRHAGE: ICD-10-CM

## 2022-09-26 DIAGNOSIS — R51.9 NONINTRACTABLE EPISODIC HEADACHE, UNSPECIFIED HEADACHE TYPE: Primary | ICD-10-CM

## 2022-09-26 PROCEDURE — 99213 OFFICE O/P EST LOW 20 MIN: CPT | Mod: S$PBB,,, | Performed by: NURSE PRACTITIONER

## 2022-09-26 PROCEDURE — 99999 PR PBB SHADOW E&M-EST. PATIENT-LVL III: ICD-10-PCS | Mod: PBBFAC,,, | Performed by: NURSE PRACTITIONER

## 2022-09-26 PROCEDURE — 99213 OFFICE O/P EST LOW 20 MIN: CPT | Mod: PBBFAC | Performed by: NURSE PRACTITIONER

## 2022-09-26 PROCEDURE — 99213 PR OFFICE/OUTPT VISIT, EST, LEVL III, 20-29 MIN: ICD-10-PCS | Mod: S$PBB,,, | Performed by: NURSE PRACTITIONER

## 2022-09-26 PROCEDURE — 99999 PR PBB SHADOW E&M-EST. PATIENT-LVL III: CPT | Mod: PBBFAC,,, | Performed by: NURSE PRACTITIONER

## 2022-09-26 NOTE — PROGRESS NOTES
Subjective:       Patient ID: Canelo Blank is a 47 y.o. male.    Chief Complaint:  Subarachnoid Hemorrhage  (Hopsital f/u. Pt states HA that are located in temporal and occipital area that lasts about a day. Describes pain as throbbing w blurred vision.)      History of Present Illness  Patient presents for follow up after hospitalization of subarachnoid hemorrhage. Patient reports on 8/8 presented to ED with headache and vomiting that would not go away. Had taken Miranda back and headache with no improvement in pain. Denies any previous history of HTN. States he exercises often; is a runner. Denies any tobacco or illicit drug use. Denies any tobacco use. Dr. Ivory performed angiogram x2 which revealed no aneurysm or source of bleed. Today patient reports still with headache at times. Located to his temporal and occipital region that will last all day at times. Describes head pain as a throbbing pain. States vision has decreased since bleed.     CT HEAD WITHOUT CONTRAST 8/8     CLINICAL HISTORY:  Subarachnoid hemorrhage (SAH) suspected;HA x 3.5 hours, N/V x3;     TECHNIQUE:  Axial scans were obtained from skull base to the vertex.     Coronal and sagittal reconstructions obtained from the axial data.     Automatic exposure control was utilized to limit radiation dose.     Contrast: None     Radiation Dose:     Total DLP: 1013 mGy*cm     COMPARISON:  None     FINDINGS:  There is subarachnoid hemorrhage centered in the prepontine cistern, with small amount extending into the bilateral sylvian fissures.  Small amount of hemorrhage is seen in the 4th ventricle.  The gray-white matter differentiation is preserved     There is sulcal effacement and mild dilatation of the lateral ventricles concerning for developing hydrocephalus.  There is no midline shift or downward herniation.     The calvarium and skull base are intact.  There is trace scattered paranasal sinus mucosal thickening.  The mastoid air cells are clear.      Impression:     Subarachnoid hemorrhage in the prepontine cistern and sylvian fissures, small volume intraventricular hemorrhage and developing hydrocephalus.     No significant change from the [facility] interpretation.          CTA HEAD AND NECK (XPD) 8/8     CLINICAL HISTORY:  SAH;     TECHNIQUE:  Axial images obtained through the cervical region and [place] before and after the administration of intravenous contrast.     Coronal, sagittal, MIP and 3D reconstructions were obtained from the axial data.     Automatic exposure control was utilized to limit radiation dose.     Radiation Dose:     Total DLP: 1692 mGy*cm     COMPARISON:  CT of the head from the same day     FINDINGS:  Head CT with contrast:     No interval changes when compared to the previous CT.     No enhancing abnormalities.     If present, stenosis of the carotid bulbs is measured based on NASCET criteria,     i.e. area of maximal stenosis compared to the cervical ICA distal to the bulb.     Cervical CTA:     The origins of the great vessels are patent with a common origin of the brachiocephalic trunk and left common carotid artery.     The common carotid arteries, carotid bulbs and internal carotid arteries are normal in caliber.     The vertebral arteries are patent.     Intracranial CTA:     The internal carotid arteries, middle cerebral arteries and anterior cerebral arteries are patent and normal in caliber.     The vertebral arteries are patent.  There is mild multifocal narrowing and irregularity of the basilar artery.  There is also irregularity of the left proximal fetal type posterior cerebral artery.     Impression:     Mild multifocal narrowing and irregularity of the basilar artery and the proximal left fetal type posterior cerebral artery, which may be due to vasospasm.  Small underlying aneurysm is not excluded and conventional angiogram is suggested.       History reviewed. No pertinent past medical history.    Past  Surgical History:   Procedure Laterality Date    APPENDECTOMY      FRACTURE SURGERY Right     pinky finger       History reviewed. No pertinent family history.    Social History     Socioeconomic History    Marital status:    Tobacco Use    Smoking status: Never     Passive exposure: Never    Smokeless tobacco: Never   Substance and Sexual Activity    Alcohol use: Never    Drug use: Never    Sexual activity: Yes       Current Outpatient Medications   Medication Sig Dispense Refill    levETIRAcetam (KEPPRA) 500 MG Tab Take 1 tablet (500 mg total) by mouth 2 (two) times daily. 60 tablet 0    niMODipine (NIMOTOP) 30 MG Cap Take 2 capsules (60 mg total) by mouth every 4 (four) hours. for 7 days 84 capsule 0     No current facility-administered medications for this visit.       Review of patient's allergies indicates:   Allergen Reactions    Shellfish containing products         Review of Systems  Review of Systems   Eyes:  Positive for visual disturbance.   Neurological:  Positive for headaches. Negative for dizziness, facial asymmetry, speech difficulty, weakness and light-headedness.   All other systems reviewed and are negative.    Objective:      Neurologic Exam     Mental Status   Oriented to person, place, and time.   Attention: normal. Concentration: normal.   Speech: speech is normal   Level of consciousness: alert  Knowledge: good.   Normal comprehension.     Cranial Nerves     CN II   Visual fields full to confrontation.     CN III, IV, VI   Pupils are equal, round, and reactive to light.  Extraocular motions are normal.     CN V   Facial sensation intact.     CN VII   Facial expression full, symmetric.     CN XII   CN XII normal.     Motor Exam   Muscle bulk: normal  Overall muscle tone: normal  Right arm tone: normal  Left arm tone: normal  Right leg tone: normal  Left leg tone: normal    Strength   Strength 5/5 throughout.     Sensory Exam   Light touch normal.     Gait, Coordination, and Reflexes      Gait  Gait: normal    Physical Exam  Vitals and nursing note reviewed.   Eyes:      Extraocular Movements: EOM normal.      Pupils: Pupils are equal, round, and reactive to light.   Pulmonary:      Effort: Pulmonary effort is normal.   Neurological:      Mental Status: He is oriented to person, place, and time.      Motor: Motor strength is normal.      Gait: Gait is intact.   Psychiatric:         Mood and Affect: Mood normal.         Speech: Speech normal.         Behavior: Behavior normal.         Thought Content: Thought content normal.         Judgment: Judgment normal.         Assessment:        1. Nonintractable episodic headache, unspecified headache type    2. Subarachnoid hemorrhage        Plan:     Repeat CT head  Advised to continue to monitor BP and follow up with PCP  Advised no NSAIDs; ok to take Antihistamine (patient states allergy problems) and tylenol      MDM low

## 2022-10-11 ENCOUNTER — HOSPITAL ENCOUNTER (OUTPATIENT)
Dept: RADIOLOGY | Facility: HOSPITAL | Age: 47
Discharge: HOME | End: 2022-10-11
Attending: NURSE PRACTITIONER
Payer: MEDICAID

## 2022-10-11 ENCOUNTER — TELEPHONE (OUTPATIENT)
Dept: NEUROLOGY | Facility: CLINIC | Age: 47
End: 2022-10-11
Payer: MEDICAID

## 2022-10-11 DIAGNOSIS — R51.9 NONINTRACTABLE EPISODIC HEADACHE, UNSPECIFIED HEADACHE TYPE: ICD-10-CM

## 2022-10-11 PROCEDURE — 70450 CT HEAD/BRAIN W/O DYE: CPT | Mod: TC

## 2022-10-11 NOTE — TELEPHONE ENCOUNTER
Please notify patient that he has a resolution of blood product on his latest CT head. It is no longer present which is what we want to see.

## 2022-12-14 ENCOUNTER — TELEPHONE (OUTPATIENT)
Dept: NEUROLOGY | Facility: CLINIC | Age: 47
End: 2022-12-14
Payer: MEDICAID

## 2022-12-14 NOTE — TELEPHONE ENCOUNTER
Pt's wife is requesting a c/b to discuss pt receiving a letter stating that he is currently on medical leave from work.  # [phone]

## 2023-01-09 ENCOUNTER — TELEPHONE (OUTPATIENT)
Dept: NEUROLOGY | Facility: CLINIC | Age: 48
End: 2023-01-09
Payer: MEDICAID

## 2023-01-09 NOTE — TELEPHONE ENCOUNTER
----- Message from Melinda Morales sent at 2023  2:26 PM CST -----  Regarding: missed call  Provider: Dr Darek Ivory,Neurology/Interventional  Neurology   False  CallType: Patient Call  To: Office   From: Canelo Blank   Phone: [phone]   Patient name: Same   : 1975   Reg Dr: Dr Darek Ivory   Ref: Missed a call from the office  and would like a call back.    [facility] ID: [phone]    --------------------------------------  Message History  Account: [id number]  Taken:  2023 11:55a [facility]  Serial#: [id number]

## 2023-01-13 ENCOUNTER — OFFICE VISIT (OUTPATIENT)
Dept: NEUROLOGY | Facility: CLINIC | Age: 48
End: 2023-01-13
Payer: MEDICAID

## 2023-01-13 VITALS
SYSTOLIC BLOOD PRESSURE: 146 MMHG | WEIGHT: 162 LBS | HEIGHT: 65 IN | BODY MASS INDEX: 26.99 KG/M2 | DIASTOLIC BLOOD PRESSURE: 82 MMHG

## 2023-01-13 DIAGNOSIS — R51.9 NONINTRACTABLE EPISODIC HEADACHE, UNSPECIFIED HEADACHE TYPE: Primary | ICD-10-CM

## 2023-01-13 DIAGNOSIS — I60.9 SAH (SUBARACHNOID HEMORRHAGE): Primary | ICD-10-CM

## 2023-01-13 DIAGNOSIS — I60.9 SUBARACHNOID HEMORRHAGE: ICD-10-CM

## 2023-01-13 PROCEDURE — 3008F BODY MASS INDEX DOCD: CPT | Mod: CPTII,,, | Performed by: PSYCHIATRY & NEUROLOGY

## 2023-01-13 PROCEDURE — 3079F DIAST BP 80-89 MM HG: CPT | Mod: CPTII,,, | Performed by: PSYCHIATRY & NEUROLOGY

## 2023-01-13 PROCEDURE — 3008F PR BODY MASS INDEX (BMI) DOCUMENTED: ICD-10-PCS | Mod: CPTII,,, | Performed by: PSYCHIATRY & NEUROLOGY

## 2023-01-13 PROCEDURE — 3077F SYST BP >= 140 MM HG: CPT | Mod: CPTII,,, | Performed by: PSYCHIATRY & NEUROLOGY

## 2023-01-13 PROCEDURE — 3077F PR MOST RECENT SYSTOLIC BLOOD PRESSURE >= 140 MM HG: ICD-10-PCS | Mod: CPTII,,, | Performed by: PSYCHIATRY & NEUROLOGY

## 2023-01-13 PROCEDURE — 3079F PR MOST RECENT DIASTOLIC BLOOD PRESSURE 80-89 MM HG: ICD-10-PCS | Mod: CPTII,,, | Performed by: PSYCHIATRY & NEUROLOGY

## 2023-01-13 PROCEDURE — 99213 OFFICE O/P EST LOW 20 MIN: CPT | Mod: PBBFAC | Performed by: PSYCHIATRY & NEUROLOGY

## 2023-01-13 PROCEDURE — 99214 PR OFFICE/OUTPT VISIT, EST, LEVL IV, 30-39 MIN: ICD-10-PCS | Mod: S$PBB,,, | Performed by: PSYCHIATRY & NEUROLOGY

## 2023-01-13 PROCEDURE — 99999 PR PBB SHADOW E&M-EST. PATIENT-LVL III: CPT | Mod: PBBFAC,,, | Performed by: PSYCHIATRY & NEUROLOGY

## 2023-01-13 PROCEDURE — 99214 OFFICE O/P EST MOD 30 MIN: CPT | Mod: S$PBB,,, | Performed by: PSYCHIATRY & NEUROLOGY

## 2023-01-13 PROCEDURE — 1159F MED LIST DOCD IN RCRD: CPT | Mod: CPTII,,, | Performed by: PSYCHIATRY & NEUROLOGY

## 2023-01-13 PROCEDURE — 99999 PR PBB SHADOW E&M-EST. PATIENT-LVL III: ICD-10-PCS | Mod: PBBFAC,,, | Performed by: PSYCHIATRY & NEUROLOGY

## 2023-01-13 PROCEDURE — 1159F PR MEDICATION LIST DOCUMENTED IN MEDICAL RECORD: ICD-10-PCS | Mod: CPTII,,, | Performed by: PSYCHIATRY & NEUROLOGY

## 2023-01-13 RX ORDER — MINERAL OIL
180 ENEMA (ML) RECTAL DAILY
COMMUNITY

## 2023-01-13 RX ORDER — GABAPENTIN 300 MG/1
300 CAPSULE ORAL NIGHTLY
Qty: 30 CAPSULE | Refills: 11 | Status: SHIPPED | OUTPATIENT
Start: 2023-01-13 | End: 2024-01-31 | Stop reason: SDUPTHER

## 2023-01-13 RX ORDER — ACETAMINOPHEN 325 MG/1
325 TABLET ORAL EVERY 6 HOURS PRN
COMMUNITY

## 2023-01-13 NOTE — PROGRESS NOTES
Neurology Office Visit  Neurology    Clement Abhay is a 47 y.o. male for f/u SAH.  Reports daily headache.  Tylenol +/- helps.  He also reports episodes of losing equilibrium.    ROS:  Review of Systems   All other systems reviewed and are negative.     Past Medical History:   Diagnosis Date    Headache     Subarachnoid hemorrhage      Past Surgical History:   Procedure Laterality Date    APPENDECTOMY      FRACTURE SURGERY Left     pinky finger     Family History   Problem Relation Age of Onset    Hypertension Mother     Esophageal cancer Father      Review of patient's allergies indicates:   Allergen Reactions    Shellfish containing products        Current Outpatient Medications:     acetaminophen (TYLENOL) 325 MG tablet, Take 325 mg by mouth every 6 (six) hours as needed for Pain., Disp: , Rfl:     fexofenadine (ALLEGRA) 180 MG tablet, Take 180 mg by mouth once daily., Disp: , Rfl:   Outpatient Medications Marked as Taking for the 1/13/23 encounter (Office Visit) with Darek Ivory MD   Medication Sig Dispense Refill    acetaminophen (TYLENOL) 325 MG tablet Take 325 mg by mouth every 6 (six) hours as needed for Pain.      fexofenadine (ALLEGRA) 180 MG tablet Take 180 mg by mouth once daily.       Social History     Tobacco Use    Smoking status: Never     Passive exposure: Never    Smokeless tobacco: Never   Substance Use Topics    Alcohol use: Never    Drug use: Never         Vitals:    01/13/23 1149   BP: (!) 146/82     Gen NAD  HEENT NC/AT  CV RRR, no carotid bruits  Resp clear  GI soft  Ext no C/C/E  Neuro  AAOx4  Speech fluent, appropriate  EOMI, PERRLA, VFF  Normal facial strength, sensation  Tongue and palate midline  Motor 5/5  Sensation intact  DTRs symmetric  Coord intact  Gait Normal    Assessment: SAH  Headache    Plan: Order final diagnostic cerebral angiogram  Trial of GBP 300mg QHS

## 2023-01-17 ENCOUNTER — TELEPHONE (OUTPATIENT)
Dept: NEUROLOGY | Facility: CLINIC | Age: 48
End: 2023-01-17
Payer: MEDICAID

## 2023-01-26 ENCOUNTER — HOSPITAL ENCOUNTER (OUTPATIENT)
Dept: INTERVENTIONAL RADIOLOGY/VASCULAR | Facility: HOSPITAL | Age: 48
Discharge: HOME | End: 2023-01-26
Attending: PSYCHIATRY & NEUROLOGY
Payer: MEDICAID

## 2023-01-26 ENCOUNTER — HOSPITAL ENCOUNTER (OUTPATIENT)
Dept: RADIOLOGY | Facility: HOSPITAL | Age: 48
Discharge: HOME | End: 2023-01-26
Payer: MEDICAID

## 2023-01-26 VITALS
HEIGHT: 65 IN | SYSTOLIC BLOOD PRESSURE: 165 MMHG | DIASTOLIC BLOOD PRESSURE: 89 MMHG | OXYGEN SATURATION: 99 % | HEART RATE: 67 BPM | TEMPERATURE: 98 F | WEIGHT: 163.38 LBS | BODY MASS INDEX: 27.22 KG/M2

## 2023-01-26 DIAGNOSIS — I60.9 SUBARACHNOID HEMORRHAGE: ICD-10-CM

## 2023-01-26 DIAGNOSIS — I63.89 OTHER CEREBRAL INFARCTION: Primary | ICD-10-CM

## 2023-01-26 LAB
ANION GAP SERPL CALC-SCNC: 8 MEQ/L
BUN SERPL-MCNC: 13.4 MG/DL (ref 8.9–20.6)
CALCIUM SERPL-MCNC: 9.5 MG/DL (ref 8.4–10.2)
CHLORIDE SERPL-SCNC: 104 MMOL/L (ref 98–107)
CO2 SERPL-SCNC: 28 MMOL/L (ref 22–29)
CREAT SERPL-MCNC: 1.12 MG/DL (ref 0.73–1.18)
CREAT/UREA NIT SERPL: 12
ERYTHROCYTE [DISTWIDTH] IN BLOOD BY AUTOMATED COUNT: 13.7 % (ref 11.5–17)
GFR SERPLBLD CREATININE-BSD FMLA CKD-EPI: >60 MLS/MIN/1.73/M2
GLUCOSE SERPL-MCNC: 96 MG/DL (ref 74–100)
HCT VFR BLD AUTO: 45.4 % (ref 42–52)
HGB BLD-MCNC: 14.9 GM/DL (ref 14–18)
INR BLD: 1.02 (ref 0–1.3)
MCH RBC QN AUTO: 26 PG
MCHC RBC AUTO-ENTMCNC: 32.8 MG/DL (ref 33–36)
MCV RBC AUTO: 79.4 FL (ref 80–94)
NRBC BLD AUTO-RTO: 0 %
PLATELET # BLD AUTO: 147 X10(3)/MCL (ref 130–400)
PMV BLD AUTO: 11.8 FL (ref 7.4–10.4)
POTASSIUM SERPL-SCNC: 3.7 MMOL/L (ref 3.5–5.1)
PROTHROMBIN TIME: 13.3 SECONDS (ref 12.5–14.5)
RBC # BLD AUTO: 5.72 X10(6)/MCL (ref 4.7–6.1)
SODIUM SERPL-SCNC: 140 MMOL/L (ref 136–145)
WBC # SPEC AUTO: 8.5 X10(3)/MCL (ref 4.5–11.5)

## 2023-01-26 PROCEDURE — 85027 COMPLETE CBC AUTOMATED: CPT | Performed by: PSYCHIATRY & NEUROLOGY

## 2023-01-26 PROCEDURE — 80048 BASIC METABOLIC PNL TOTAL CA: CPT | Performed by: PSYCHIATRY & NEUROLOGY

## 2023-01-26 PROCEDURE — 85610 PROTHROMBIN TIME: CPT | Performed by: PSYCHIATRY & NEUROLOGY

## 2023-01-26 PROCEDURE — 70450 CT HEAD/BRAIN W/O DYE: CPT | Mod: TC

## 2023-01-26 RX ORDER — DIPHENHYDRAMINE HCL 50 MG/1
CAPSULE ORAL
COMMUNITY
Start: 2023-01-17 | End: 2023-06-09

## 2023-01-26 RX ORDER — PREDNISONE 10 MG/1
TABLET ORAL
COMMUNITY
Start: 2023-01-17 | End: 2023-06-09

## 2023-01-26 RX ORDER — SODIUM CHLORIDE 9 MG/ML
75 INJECTION, SOLUTION INTRAVENOUS CONTINUOUS
Status: DISCONTINUED | OUTPATIENT
Start: 2023-01-26 | End: 2023-01-27 | Stop reason: HOSPADM

## 2023-02-01 ENCOUNTER — TELEPHONE (OUTPATIENT)
Dept: NEUROLOGY | Facility: CLINIC | Age: 48
End: 2023-02-01
Payer: MEDICAID

## 2023-02-01 NOTE — TELEPHONE ENCOUNTER
Called patient to r/s cerebral angiogram. Patient states not interested. Advised patient to call when he decides to r/s

## 2023-02-08 ENCOUNTER — TELEPHONE (OUTPATIENT)
Dept: NEUROLOGY | Facility: CLINIC | Age: 48
End: 2023-02-08
Payer: MEDICAID

## 2023-02-08 DIAGNOSIS — I60.8 OTHER NONTRAUMATIC SUBARACHNOID HEMORRHAGE: Primary | ICD-10-CM

## 2023-02-15 ENCOUNTER — TELEPHONE (OUTPATIENT)
Dept: NEUROLOGY | Facility: CLINIC | Age: 48
End: 2023-02-15
Payer: MEDICAID

## 2023-02-15 NOTE — TELEPHONE ENCOUNTER
Pt called reporting his CTA is scheduled for 2/28/23 pt is allergic to shellfish/iodine and reports he had to take medication prior to previous testing w/ contrast.    He is requesting for medication to be sent to [facility] on [address] for him to take prior to imaging.    # [phone]

## 2023-02-15 NOTE — TELEPHONE ENCOUNTER
Called in pre-medications to patients pharmacy. Informed patient that medication will be ready in two hours. Verbalized understanding.

## 2023-02-28 ENCOUNTER — HOSPITAL ENCOUNTER (OUTPATIENT)
Dept: RADIOLOGY | Facility: HOSPITAL | Age: 48
Discharge: HOME | End: 2023-02-28
Attending: PSYCHIATRY & NEUROLOGY
Payer: MEDICAID

## 2023-02-28 DIAGNOSIS — I60.8 OTHER NONTRAUMATIC SUBARACHNOID HEMORRHAGE: ICD-10-CM

## 2023-02-28 PROCEDURE — 70496 CT ANGIOGRAPHY HEAD: CPT | Mod: TC

## 2023-02-28 PROCEDURE — 25500020 PHARM REV CODE 255: Performed by: PSYCHIATRY & NEUROLOGY

## 2023-02-28 RX ADMIN — IOPAMIDOL 100 ML: 755 INJECTION, SOLUTION INTRAVENOUS at 11:02

## 2023-03-08 ENCOUNTER — OFFICE VISIT (OUTPATIENT)
Dept: NEUROLOGY | Facility: CLINIC | Age: 48
End: 2023-03-08
Payer: MEDICAID

## 2023-03-08 VITALS
HEIGHT: 65 IN | DIASTOLIC BLOOD PRESSURE: 88 MMHG | WEIGHT: 163 LBS | SYSTOLIC BLOOD PRESSURE: 150 MMHG | BODY MASS INDEX: 27.16 KG/M2

## 2023-03-08 DIAGNOSIS — I60.9 SAH (SUBARACHNOID HEMORRHAGE): Primary | ICD-10-CM

## 2023-03-08 PROCEDURE — 3077F SYST BP >= 140 MM HG: CPT | Mod: CPTII,,, | Performed by: PSYCHIATRY & NEUROLOGY

## 2023-03-08 PROCEDURE — 99999 PR PBB SHADOW E&M-EST. PATIENT-LVL III: CPT | Mod: PBBFAC,,, | Performed by: PSYCHIATRY & NEUROLOGY

## 2023-03-08 PROCEDURE — 3008F BODY MASS INDEX DOCD: CPT | Mod: CPTII,,, | Performed by: PSYCHIATRY & NEUROLOGY

## 2023-03-08 PROCEDURE — 1159F PR MEDICATION LIST DOCUMENTED IN MEDICAL RECORD: ICD-10-PCS | Mod: CPTII,,, | Performed by: PSYCHIATRY & NEUROLOGY

## 2023-03-08 PROCEDURE — 99213 OFFICE O/P EST LOW 20 MIN: CPT | Mod: S$PBB,,, | Performed by: PSYCHIATRY & NEUROLOGY

## 2023-03-08 PROCEDURE — 3079F PR MOST RECENT DIASTOLIC BLOOD PRESSURE 80-89 MM HG: ICD-10-PCS | Mod: CPTII,,, | Performed by: PSYCHIATRY & NEUROLOGY

## 2023-03-08 PROCEDURE — 3077F PR MOST RECENT SYSTOLIC BLOOD PRESSURE >= 140 MM HG: ICD-10-PCS | Mod: CPTII,,, | Performed by: PSYCHIATRY & NEUROLOGY

## 2023-03-08 PROCEDURE — 99213 PR OFFICE/OUTPT VISIT, EST, LEVL III, 20-29 MIN: ICD-10-PCS | Mod: S$PBB,,, | Performed by: PSYCHIATRY & NEUROLOGY

## 2023-03-08 PROCEDURE — 3008F PR BODY MASS INDEX (BMI) DOCUMENTED: ICD-10-PCS | Mod: CPTII,,, | Performed by: PSYCHIATRY & NEUROLOGY

## 2023-03-08 PROCEDURE — 3079F DIAST BP 80-89 MM HG: CPT | Mod: CPTII,,, | Performed by: PSYCHIATRY & NEUROLOGY

## 2023-03-08 PROCEDURE — 99999 PR PBB SHADOW E&M-EST. PATIENT-LVL III: ICD-10-PCS | Mod: PBBFAC,,, | Performed by: PSYCHIATRY & NEUROLOGY

## 2023-03-08 PROCEDURE — 1159F MED LIST DOCD IN RCRD: CPT | Mod: CPTII,,, | Performed by: PSYCHIATRY & NEUROLOGY

## 2023-03-08 PROCEDURE — 99213 OFFICE O/P EST LOW 20 MIN: CPT | Mod: PBBFAC | Performed by: PSYCHIATRY & NEUROLOGY

## 2023-03-08 NOTE — PROGRESS NOTES
Neurology Office Visit  Neurology    Clement Abhay is a 47 y.o. male for f/u.  Doing well.  No neuro issues.  CTA WNL.    ROS:  Review of Systems   All other systems reviewed and are negative.     Past Medical History:   Diagnosis Date    Headache     Subarachnoid hemorrhage      Past Surgical History:   Procedure Laterality Date    APPENDECTOMY      FRACTURE SURGERY Left     pinky finger     Family History   Problem Relation Age of Onset    Hypertension Mother     Esophageal cancer Father      Review of patient's allergies indicates:   Allergen Reactions    Shellfish containing products        Current Outpatient Medications:     acetaminophen (TYLENOL) 325 MG tablet, Take 325 mg by mouth every 6 (six) hours as needed for Pain., Disp: , Rfl:     BANOPHEN 50 mg capsule, TAKE 1 CAPSULE BY MOUTH THE DAY BEFORE PROCEDURE AND 1 CAPSULE BY MOUTH THE DAY OF PROCEDURE, Disp: , Rfl:     fexofenadine (ALLEGRA) 180 MG tablet, Take 180 mg by mouth once daily., Disp: , Rfl:     gabapentin (NEURONTIN) 300 MG capsule, Take 1 capsule (300 mg total) by mouth every evening., Disp: 30 capsule, Rfl: 11    predniSONE (DELTASONE) 10 MG tablet, TAKE 1 TABLET BY MOUTH THREE TIMES DAILY FOR 2 DAYS PRIOR TO PROCEDURE, Disp: , Rfl:   Outpatient Medications Marked as Taking for the 3/8/23 encounter (Office Visit) with Darek Ivory MD   Medication Sig Dispense Refill    acetaminophen (TYLENOL) 325 MG tablet Take 325 mg by mouth every 6 (six) hours as needed for Pain.      BANOPHEN 50 mg capsule TAKE 1 CAPSULE BY MOUTH THE DAY BEFORE PROCEDURE AND 1 CAPSULE BY MOUTH THE DAY OF PROCEDURE      fexofenadine (ALLEGRA) 180 MG tablet Take 180 mg by mouth once daily.      gabapentin (NEURONTIN) 300 MG capsule Take 1 capsule (300 mg total) by mouth every evening. 30 capsule 11    predniSONE (DELTASONE) 10 MG tablet TAKE 1 TABLET BY MOUTH THREE TIMES DAILY FOR 2 DAYS PRIOR TO PROCEDURE       Social History     Tobacco Use    Smoking status: Never      Passive exposure: Never    Smokeless tobacco: Never   Substance Use Topics    Alcohol use: Not Currently    Drug use: Never         Vitals:    03/08/23 0939   BP: (!) 150/88     Gen NAD  HEENT NC/AT  CV RRR, no carotid bruits  Resp clear  GI soft  Ext no C/C/E  Neuro  AAOx4  Speech fluent, appropriate  EOMI, PERRLA, VFF  Normal facial strength, sensation  Tongue and palate midline  Motor 5/5  Sensation intact  DTRs symmetric  Coord intact  Gait Normal    Assessment: SAH    Plan: Continue present management  Ok to return to work, from neuro standpoint.

## 2023-03-13 ENCOUNTER — TELEPHONE (OUTPATIENT)
Dept: NEUROLOGY | Facility: CLINIC | Age: 48
End: 2023-03-13
Payer: MEDICAID

## 2023-03-13 NOTE — TELEPHONE ENCOUNTER
Patient will come  letter stating he is able to return from work from Neuro standpoint per 's last office note.

## 2023-03-13 NOTE — TELEPHONE ENCOUNTER
----- Message from Melinda Morales sent at 3/13/2023  1:05 PM CDT -----  Regarding: work restrictions  Mon 13-Mar-23 12:11p TAKEN  Patient Calls  To:          Ofc  From:        Canelo Blank  Phone:       [phone]  Patient:     Same  :         [birth year]  RegDr:      Dr Darek Ivory  Ref:         has questions about returning to work with no restrictions - having issues with his job - pls call     ClrID:    [phone]

## 2023-06-09 ENCOUNTER — OFFICE VISIT (OUTPATIENT)
Dept: NEUROLOGY | Facility: CLINIC | Age: 48
End: 2023-06-09
Payer: MEDICAID

## 2023-06-09 VITALS
SYSTOLIC BLOOD PRESSURE: 150 MMHG | BODY MASS INDEX: 27.16 KG/M2 | WEIGHT: 163 LBS | DIASTOLIC BLOOD PRESSURE: 88 MMHG | HEIGHT: 65 IN

## 2023-06-09 DIAGNOSIS — I60.9 SAH (SUBARACHNOID HEMORRHAGE): Primary | ICD-10-CM

## 2023-06-09 PROCEDURE — 99999 PR PBB SHADOW E&M-EST. PATIENT-LVL II: ICD-10-PCS | Mod: PBBFAC,,, | Performed by: PSYCHIATRY & NEUROLOGY

## 2023-06-09 PROCEDURE — 3008F BODY MASS INDEX DOCD: CPT | Mod: CPTII,,, | Performed by: PSYCHIATRY & NEUROLOGY

## 2023-06-09 PROCEDURE — 1159F PR MEDICATION LIST DOCUMENTED IN MEDICAL RECORD: ICD-10-PCS | Mod: CPTII,,, | Performed by: PSYCHIATRY & NEUROLOGY

## 2023-06-09 PROCEDURE — 99212 OFFICE O/P EST SF 10 MIN: CPT | Mod: PBBFAC | Performed by: PSYCHIATRY & NEUROLOGY

## 2023-06-09 PROCEDURE — 99213 OFFICE O/P EST LOW 20 MIN: CPT | Mod: S$PBB,,, | Performed by: PSYCHIATRY & NEUROLOGY

## 2023-06-09 PROCEDURE — 3008F PR BODY MASS INDEX (BMI) DOCUMENTED: ICD-10-PCS | Mod: CPTII,,, | Performed by: PSYCHIATRY & NEUROLOGY

## 2023-06-09 PROCEDURE — 3079F PR MOST RECENT DIASTOLIC BLOOD PRESSURE 80-89 MM HG: ICD-10-PCS | Mod: CPTII,,, | Performed by: PSYCHIATRY & NEUROLOGY

## 2023-06-09 PROCEDURE — 3079F DIAST BP 80-89 MM HG: CPT | Mod: CPTII,,, | Performed by: PSYCHIATRY & NEUROLOGY

## 2023-06-09 PROCEDURE — 99213 PR OFFICE/OUTPT VISIT, EST, LEVL III, 20-29 MIN: ICD-10-PCS | Mod: S$PBB,,, | Performed by: PSYCHIATRY & NEUROLOGY

## 2023-06-09 PROCEDURE — 99999 PR PBB SHADOW E&M-EST. PATIENT-LVL II: CPT | Mod: PBBFAC,,, | Performed by: PSYCHIATRY & NEUROLOGY

## 2023-06-09 PROCEDURE — 1159F MED LIST DOCD IN RCRD: CPT | Mod: CPTII,,, | Performed by: PSYCHIATRY & NEUROLOGY

## 2023-06-09 PROCEDURE — 3077F PR MOST RECENT SYSTOLIC BLOOD PRESSURE >= 140 MM HG: ICD-10-PCS | Mod: CPTII,,, | Performed by: PSYCHIATRY & NEUROLOGY

## 2023-06-09 PROCEDURE — 3077F SYST BP >= 140 MM HG: CPT | Mod: CPTII,,, | Performed by: PSYCHIATRY & NEUROLOGY

## 2023-08-14 ENCOUNTER — OFFICE VISIT (OUTPATIENT)
Dept: NEUROLOGY | Facility: CLINIC | Age: 48
End: 2023-08-14
Payer: MEDICAID

## 2023-08-14 VITALS
WEIGHT: 163 LBS | BODY MASS INDEX: 27.16 KG/M2 | SYSTOLIC BLOOD PRESSURE: 136 MMHG | DIASTOLIC BLOOD PRESSURE: 88 MMHG | HEART RATE: 80 BPM | HEIGHT: 65 IN

## 2023-08-14 DIAGNOSIS — I60.9 SAH (SUBARACHNOID HEMORRHAGE): Primary | ICD-10-CM

## 2023-08-14 PROCEDURE — 99999 PR PBB SHADOW E&M-EST. PATIENT-LVL III: CPT | Mod: PBBFAC,,, | Performed by: PSYCHIATRY & NEUROLOGY

## 2023-08-14 PROCEDURE — 4010F PR ACE/ARB THEARPY RXD/TAKEN: ICD-10-PCS | Mod: CPTII,,, | Performed by: PSYCHIATRY & NEUROLOGY

## 2023-08-14 PROCEDURE — 4010F ACE/ARB THERAPY RXD/TAKEN: CPT | Mod: CPTII,,, | Performed by: PSYCHIATRY & NEUROLOGY

## 2023-08-14 PROCEDURE — 1159F MED LIST DOCD IN RCRD: CPT | Mod: CPTII,,, | Performed by: PSYCHIATRY & NEUROLOGY

## 2023-08-14 PROCEDURE — 3075F SYST BP GE 130 - 139MM HG: CPT | Mod: CPTII,,, | Performed by: PSYCHIATRY & NEUROLOGY

## 2023-08-14 PROCEDURE — 1159F PR MEDICATION LIST DOCUMENTED IN MEDICAL RECORD: ICD-10-PCS | Mod: CPTII,,, | Performed by: PSYCHIATRY & NEUROLOGY

## 2023-08-14 PROCEDURE — 99213 OFFICE O/P EST LOW 20 MIN: CPT | Mod: S$PBB,,, | Performed by: PSYCHIATRY & NEUROLOGY

## 2023-08-14 PROCEDURE — 99213 OFFICE O/P EST LOW 20 MIN: CPT | Mod: PBBFAC | Performed by: PSYCHIATRY & NEUROLOGY

## 2023-08-14 PROCEDURE — 99213 PR OFFICE/OUTPT VISIT, EST, LEVL III, 20-29 MIN: ICD-10-PCS | Mod: S$PBB,,, | Performed by: PSYCHIATRY & NEUROLOGY

## 2023-08-14 PROCEDURE — 3075F PR MOST RECENT SYSTOLIC BLOOD PRESS GE 130-139MM HG: ICD-10-PCS | Mod: CPTII,,, | Performed by: PSYCHIATRY & NEUROLOGY

## 2023-08-14 PROCEDURE — 3079F DIAST BP 80-89 MM HG: CPT | Mod: CPTII,,, | Performed by: PSYCHIATRY & NEUROLOGY

## 2023-08-14 PROCEDURE — 3008F PR BODY MASS INDEX (BMI) DOCUMENTED: ICD-10-PCS | Mod: CPTII,,, | Performed by: PSYCHIATRY & NEUROLOGY

## 2023-08-14 PROCEDURE — 3008F BODY MASS INDEX DOCD: CPT | Mod: CPTII,,, | Performed by: PSYCHIATRY & NEUROLOGY

## 2023-08-14 PROCEDURE — 99999 PR PBB SHADOW E&M-EST. PATIENT-LVL III: ICD-10-PCS | Mod: PBBFAC,,, | Performed by: PSYCHIATRY & NEUROLOGY

## 2023-08-14 PROCEDURE — 3079F PR MOST RECENT DIASTOLIC BLOOD PRESSURE 80-89 MM HG: ICD-10-PCS | Mod: CPTII,,, | Performed by: PSYCHIATRY & NEUROLOGY

## 2023-08-14 NOTE — PROGRESS NOTES
Neurology Office Visit  Neurology    Clement Abhay is a 47 y.o. male for f/u.  No new neuro issues.  Tolerating meds.    ROS:  Review of Systems   All other systems reviewed and are negative.     Past Medical History:   Diagnosis Date    Headache     Subarachnoid hemorrhage      Past Surgical History:   Procedure Laterality Date    APPENDECTOMY      FRACTURE SURGERY Left     pinky finger     Family History   Problem Relation Age of Onset    Hypertension Mother     Esophageal cancer Father      Review of patient's allergies indicates:   Allergen Reactions    Shellfish containing products        Current Outpatient Medications:     acetaminophen (TYLENOL) 325 MG tablet, Take 325 mg by mouth every 6 (six) hours as needed for Pain., Disp: , Rfl:     fexofenadine (ALLEGRA) 180 MG tablet, Take 180 mg by mouth once daily., Disp: , Rfl:     gabapentin (NEURONTIN) 300 MG capsule, Take 1 capsule (300 mg total) by mouth every evening., Disp: 30 capsule, Rfl: 11  Outpatient Medications Marked as Taking for the 8/14/23 encounter (Office Visit) with Darek Ivory MD   Medication Sig Dispense Refill    acetaminophen (TYLENOL) 325 MG tablet Take 325 mg by mouth every 6 (six) hours as needed for Pain.      fexofenadine (ALLEGRA) 180 MG tablet Take 180 mg by mouth once daily.      gabapentin (NEURONTIN) 300 MG capsule Take 1 capsule (300 mg total) by mouth every evening. 30 capsule 11     Social History     Tobacco Use    Smoking status: Never     Passive exposure: Never    Smokeless tobacco: Never   Substance Use Topics    Alcohol use: Not Currently    Drug use: Never         Vitals:    08/14/23 0851   BP: 136/88   Pulse: 80     Gen NAD  HEENT NC/AT  CV RRR, no carotid bruits  Resp clear  GI soft  Ext no C/C/E  Neuro  AAOx4  Speech fluent, appropriate  EOMI, PERRLA, VFF  Normal facial strength, sensation  Tongue and palate midline  Motor 5/5  Sensation intact  DTRs symmetric  Coord intact  Gait Normal    Assessment: SAH    Plan:  Continue present management

## 2024-01-31 DIAGNOSIS — I60.9 SUBARACHNOID HEMORRHAGE: ICD-10-CM

## 2024-01-31 DIAGNOSIS — R51.9 NONINTRACTABLE EPISODIC HEADACHE, UNSPECIFIED HEADACHE TYPE: ICD-10-CM

## 2024-01-31 RX ORDER — GABAPENTIN 300 MG/1
300 CAPSULE ORAL NIGHTLY
Qty: 30 CAPSULE | Refills: 11 | Status: SHIPPED | OUTPATIENT
Start: 2024-01-31 | End: 2024-02-19 | Stop reason: SDUPTHER

## 2024-02-19 ENCOUNTER — OFFICE VISIT (OUTPATIENT)
Dept: NEUROLOGY | Facility: CLINIC | Age: 49
End: 2024-02-19
Payer: COMMERCIAL

## 2024-02-19 VITALS
SYSTOLIC BLOOD PRESSURE: 152 MMHG | HEART RATE: 67 BPM | DIASTOLIC BLOOD PRESSURE: 105 MMHG | WEIGHT: 163 LBS | BODY MASS INDEX: 27.16 KG/M2 | HEIGHT: 65 IN

## 2024-02-19 DIAGNOSIS — I60.9 SAH (SUBARACHNOID HEMORRHAGE): Primary | ICD-10-CM

## 2024-02-19 DIAGNOSIS — R51.9 NONINTRACTABLE EPISODIC HEADACHE, UNSPECIFIED HEADACHE TYPE: ICD-10-CM

## 2024-02-19 DIAGNOSIS — I60.9 SUBARACHNOID HEMORRHAGE: ICD-10-CM

## 2024-02-19 PROCEDURE — 3080F DIAST BP >= 90 MM HG: CPT | Mod: CPTII,S$GLB,, | Performed by: NURSE PRACTITIONER

## 2024-02-19 PROCEDURE — 3008F BODY MASS INDEX DOCD: CPT | Mod: CPTII,S$GLB,, | Performed by: NURSE PRACTITIONER

## 2024-02-19 PROCEDURE — 99213 OFFICE O/P EST LOW 20 MIN: CPT | Mod: S$GLB,,, | Performed by: NURSE PRACTITIONER

## 2024-02-19 PROCEDURE — 1160F RVW MEDS BY RX/DR IN RCRD: CPT | Mod: CPTII,S$GLB,, | Performed by: NURSE PRACTITIONER

## 2024-02-19 PROCEDURE — 3077F SYST BP >= 140 MM HG: CPT | Mod: CPTII,S$GLB,, | Performed by: NURSE PRACTITIONER

## 2024-02-19 PROCEDURE — 99999 PR PBB SHADOW E&M-EST. PATIENT-LVL III: CPT | Mod: PBBFAC,,, | Performed by: NURSE PRACTITIONER

## 2024-02-19 PROCEDURE — 1159F MED LIST DOCD IN RCRD: CPT | Mod: CPTII,S$GLB,, | Performed by: NURSE PRACTITIONER

## 2024-02-19 RX ORDER — GABAPENTIN 300 MG/1
300 CAPSULE ORAL NIGHTLY
Qty: 90 CAPSULE | Refills: 3 | Status: SHIPPED | OUTPATIENT
Start: 2024-02-19 | End: 2025-02-18

## 2024-02-19 NOTE — PROGRESS NOTES
Subjective:      Patient ID: Canelo Blank is a 48 y.o. male.    Chief Complaint:  subarachnoid hemorrhage  (6 mos f/u . Patient c/o of dizziness when first waking up. Denies HA, slurred speech, weakness,blurred vision or N/V )      History of Present Illness  Patient presents for follow up of subarachnoid hemorrhage. 8/8/22 patient presented to ED with headache and vomiting that would not go away. Dr. Ivory performed angiogram x2 which revealed no aneurysm or source of bleed. Today, patient denies any new onset of numbness, tingling or weakness. Denies any blurred vision. Denies any headache. Patient reports dizziness when he first wakes up. Blood pressure is high today. Reports it is normal at home. Checks BP every morning.          Past Medical History:   Diagnosis Date    Headache     Subarachnoid hemorrhage        Past Surgical History:   Procedure Laterality Date    APPENDECTOMY      FRACTURE SURGERY Left     pinky finger       Family History   Problem Relation Age of Onset    Hypertension Mother     Esophageal cancer Father        Social History     Socioeconomic History    Marital status:    Tobacco Use    Smoking status: Never     Passive exposure: Never    Smokeless tobacco: Never   Substance and Sexual Activity    Alcohol use: Not Currently    Drug use: Never    Sexual activity: Yes     Partners: Female       Current Outpatient Medications   Medication Sig Dispense Refill    acetaminophen (TYLENOL) 325 MG tablet Take 325 mg by mouth every 6 (six) hours as needed for Pain.      fexofenadine (ALLEGRA) 180 MG tablet Take 180 mg by mouth once daily.      gabapentin (NEURONTIN) 300 MG capsule Take 1 capsule (300 mg total) by mouth every evening. 30 capsule 11     No current facility-administered medications for this visit.       Review of patient's allergies indicates:   Allergen Reactions    Shellfish containing products         Vitals:    02/19/24 0816   BP: (!) 152/105   BP Location: Left arm  "  Patient Position: Sitting   Pulse: 67   Weight: 73.9 kg (163 lb)   Height: 5' 5" (1.651 m)      Review of Systems  12 point ROS performed and negative unless otherwise documented in HPI  Objective:      Neurologic Exam      Mental Status   Oriented to person, place, and time.   Attention: normal. Concentration: normal.   Speech: speech is normal   Level of consciousness: alert  Knowledge: good.   Normal comprehension.      Cranial Nerves      CN II   Visual fields full to confrontation.      CN III, IV, VI   Pupils are equal, round, and reactive to light.  Extraocular motions are normal.      CN V   Facial sensation intact.      CN VII   Facial expression full, symmetric.      CN XII   CN XII normal.      Motor Exam   Muscle bulk: normal  Overall muscle tone: normal  Right arm tone: normal  Left arm tone: normal  Right leg tone: normal  Left leg tone: normal     Strength   Strength 5/5 throughout.      Sensory Exam   Light touch normal.      Gait, Coordination, and Reflexes      Gait  Gait: normal     Physical Exam  Vitals and nursing note reviewed.   Eyes:      Extraocular Movements: EOM normal.      Pupils: Pupils are equal, round, and reactive to light.   Pulmonary:      Effort: Pulmonary effort is normal.   Neurological:      Mental Status: He is oriented to person, place, and time.      Motor: Motor strength is normal.      Gait: Gait is intact.   Psychiatric:         Mood and Affect: Mood normal.         Speech: Speech normal.         Behavior: Behavior normal.         Thought Content: Thought content normal.         Judgment: Judgment normal.         Assessment:     1. SAH (subarachnoid hemorrhage)    2. Nonintractable episodic headache, unspecified headache type        Plan:     Continue to monitor BP at home  Gabapentin 300 mg QHS        "

## 2024-11-18 ENCOUNTER — OFFICE VISIT (OUTPATIENT)
Dept: NEUROLOGY | Facility: CLINIC | Age: 49
End: 2024-11-18
Payer: COMMERCIAL

## 2024-11-18 VITALS
HEART RATE: 80 BPM | HEIGHT: 65 IN | SYSTOLIC BLOOD PRESSURE: 172 MMHG | BODY MASS INDEX: 26.99 KG/M2 | DIASTOLIC BLOOD PRESSURE: 105 MMHG | WEIGHT: 162 LBS

## 2024-11-18 DIAGNOSIS — R51.9 NONINTRACTABLE EPISODIC HEADACHE, UNSPECIFIED HEADACHE TYPE: ICD-10-CM

## 2024-11-18 DIAGNOSIS — I60.9 SUBARACHNOID HEMORRHAGE: ICD-10-CM

## 2024-11-18 DIAGNOSIS — I60.9 SAH (SUBARACHNOID HEMORRHAGE): Primary | ICD-10-CM

## 2024-11-18 PROCEDURE — 3080F DIAST BP >= 90 MM HG: CPT | Mod: CPTII,S$GLB,, | Performed by: NURSE PRACTITIONER

## 2024-11-18 PROCEDURE — 1160F RVW MEDS BY RX/DR IN RCRD: CPT | Mod: CPTII,S$GLB,, | Performed by: NURSE PRACTITIONER

## 2024-11-18 PROCEDURE — 3077F SYST BP >= 140 MM HG: CPT | Mod: CPTII,S$GLB,, | Performed by: NURSE PRACTITIONER

## 2024-11-18 PROCEDURE — 1159F MED LIST DOCD IN RCRD: CPT | Mod: CPTII,S$GLB,, | Performed by: NURSE PRACTITIONER

## 2024-11-18 PROCEDURE — 3008F BODY MASS INDEX DOCD: CPT | Mod: CPTII,S$GLB,, | Performed by: NURSE PRACTITIONER

## 2024-11-18 PROCEDURE — 99213 OFFICE O/P EST LOW 20 MIN: CPT | Mod: S$GLB,,, | Performed by: NURSE PRACTITIONER

## 2024-11-18 PROCEDURE — 99999 PR PBB SHADOW E&M-EST. PATIENT-LVL III: CPT | Mod: PBBFAC,,, | Performed by: NURSE PRACTITIONER

## 2024-11-18 RX ORDER — GABAPENTIN 300 MG/1
300 CAPSULE ORAL NIGHTLY
Qty: 90 CAPSULE | Refills: 3 | Status: SHIPPED | OUTPATIENT
Start: 2024-11-18 | End: 2025-11-18

## 2024-11-18 NOTE — PROGRESS NOTES
Subjective:      Patient ID: Canelo Blank is a 49 y.o. male.    Chief Complaint:  SAH (subarachnoid hemorrhage) (Pt c/o mild headaches)      History of Present Illness  Patient presents for follow up of subarachnoid hemorrhage. 8/8/22 patient presented to ED with headache and vomiting that would not go away. Dr. Ivory performed angiogram x2 which revealed no aneurysm or source of bleed. Today, patient denies any new onset of numbness, tingling or weakness. Denies any blurred vision. Denies any headache. Reports that if he has a headache, he takes tylenol which alleviates headache. He does monitor his BP at home. He is back to running every weekend about 5-6 miles each day.      An office visit of an established patient, 49 years old. Prior to the patient's arrival on the same day, the provider spends 5 minutes reviewing the results of lab work, hospital stay and physician notes. Once in the exam room with the patient, the provider then spends 15 minutes in the room with the member performing a history and exam as well as reviewing the test results and recommendations with the patient. After leaving the exam room, the provider then spends an additional 5 minutes completing the electronic health record. The total time spent that day caring for the member is 25 minutes, and this time--including the breakdown--is documented in the medical record.    Past Medical History:   Diagnosis Date    Headache     Subarachnoid hemorrhage        Past Surgical History:   Procedure Laterality Date    APPENDECTOMY      FRACTURE SURGERY Left     pinky finger       Family History   Problem Relation Name Age of Onset    Hypertension Mother      Esophageal cancer Father         Social History     Socioeconomic History    Marital status:    Tobacco Use    Smoking status: Never     Passive exposure: Never    Smokeless tobacco: Never   Substance and Sexual Activity    Alcohol use: Not Currently    Drug use: Never    Sexual  "activity: Yes     Partners: Female       Current Outpatient Medications   Medication Sig Dispense Refill    acetaminophen (TYLENOL) 325 MG tablet Take 325 mg by mouth every 6 (six) hours as needed for Pain.      fexofenadine (ALLEGRA) 180 MG tablet Take 180 mg by mouth once daily.      gabapentin (NEURONTIN) 300 MG capsule Take 1 capsule (300 mg total) by mouth every evening. 90 capsule 3     No current facility-administered medications for this visit.       Review of patient's allergies indicates:   Allergen Reactions    Shellfish containing products         Vitals:    11/18/24 0818 11/18/24 0826   BP: (!) 163/107 (!) 172/105   BP Location: Left arm Right arm   Patient Position: Sitting Sitting   Pulse: 73 80   Weight: 73.5 kg (162 lb)    Height: 5' 5" (1.651 m)         Review of Systems  12 point ROS performed and negative unless otherwise documented in HPI  Objective:    Neurologic Exam      Mental Status   Oriented to person, place, and time.   Attention: normal. Concentration: normal.   Speech: speech is normal   Level of consciousness: alert  Knowledge: good.   Normal comprehension.      Cranial Nerves      CN II   Visual fields full to confrontation.      CN III, IV, VI   Pupils are equal, round, and reactive to light.  Extraocular motions are normal.      CN V   Facial sensation intact.      CN VII   Facial expression full, symmetric.      CN XII   CN XII normal.      Motor Exam   Muscle bulk: normal  Overall muscle tone: normal  Right arm tone: normal  Left arm tone: normal  Right leg tone: normal  Left leg tone: normal     Strength   Strength 5/5 throughout.      Sensory Exam   Light touch normal.      Gait, Coordination, and Reflexes      Gait  Gait: normal     Physical Exam  Vitals and nursing note reviewed.   Eyes:      Extraocular Movements: EOM normal.      Pupils: Pupils are equal, round, and reactive to light.   Pulmonary:      Effort: Pulmonary effort is normal.   Neurological:      Mental Status: " He is oriented to person, place, and time.      Motor: Motor strength is normal.      Gait: Gait is intact.   Psychiatric:         Mood and Affect: Mood normal.         Speech: Speech normal.         Behavior: Behavior normal.         Thought Content: Thought content normal.         Judgment: Judgment normal.         Assessment:     1. SAH (subarachnoid hemorrhage)    2. Nonintractable episodic headache, unspecified headache type    3. Subarachnoid hemorrhage        Plan:     Patient's BP elevated at visit. He reports his BP was 130/80 this morning on his home monitor  Advised to continue to monitor BP at home  Ok to wean off of gabapentin. Advised to take gabapentin every other night for the next week then he can stop gabapentin. He has reported a reduction in headaches

## 2024-12-22 ENCOUNTER — HOSPITAL ENCOUNTER (INPATIENT)
Facility: HOSPITAL | Age: 49
LOS: 3 days | Discharge: HOME | DRG: 322 | End: 2024-12-25
Attending: STUDENT IN AN ORGANIZED HEALTH CARE EDUCATION/TRAINING PROGRAM | Admitting: INTERNAL MEDICINE
Payer: COMMERCIAL

## 2024-12-22 DIAGNOSIS — I21.02 ST ELEVATION MYOCARDIAL INFARCTION INVOLVING LEFT ANTERIOR DESCENDING (LAD) CORONARY ARTERY: Primary | ICD-10-CM

## 2024-12-22 DIAGNOSIS — I25.10 CAD (CORONARY ARTERY DISEASE): ICD-10-CM

## 2024-12-22 DIAGNOSIS — I21.3 ST ELEVATION MYOCARDIAL INFARCTION (STEMI), UNSPECIFIED ARTERY: ICD-10-CM

## 2024-12-22 DIAGNOSIS — I21.3 STEMI (ST ELEVATION MYOCARDIAL INFARCTION): ICD-10-CM

## 2024-12-22 DIAGNOSIS — I21.09 ACUTE ST ELEVATION MYOCARDIAL INFARCTION (STEMI) OF ANTERIOR WALL: ICD-10-CM

## 2024-12-22 DIAGNOSIS — R07.9 CHEST PAIN: ICD-10-CM

## 2024-12-22 LAB
ALBUMIN SERPL-MCNC: 3.9 G/DL (ref 3.5–5)
ALBUMIN SERPL-MCNC: 4.1 G/DL (ref 3.5–5)
ALBUMIN/GLOB SERPL: 1.2 RATIO (ref 1.1–2)
ALBUMIN/GLOB SERPL: 1.3 RATIO (ref 1.1–2)
ALP SERPL-CCNC: 56 UNIT/L (ref 40–150)
ALP SERPL-CCNC: 56 UNIT/L (ref 40–150)
ALT SERPL-CCNC: 16 UNIT/L (ref 0–55)
ALT SERPL-CCNC: 176 UNIT/L (ref 0–55)
ANION GAP SERPL CALC-SCNC: 14 MEQ/L
ANION GAP SERPL CALC-SCNC: 14 MEQ/L
ANION GAP SERPL CALC-SCNC: 15 MMOL/L (ref 8–16)
APICAL FOUR CHAMBER EJECTION FRACTION: 41 %
APICAL TWO CHAMBER EJECTION FRACTION: 45 %
AST SERPL-CCNC: 17 UNIT/L (ref 5–34)
AST SERPL-CCNC: 889 UNIT/L (ref 5–34)
AV INDEX (PROSTH): 0.91
AV MEAN GRADIENT: 1 MMHG
AV PEAK GRADIENT: 2.6 MMHG
AV VALVE AREA BY VELOCITY RATIO: 3.5 CM²
AV VALVE AREA: 2.9 CM²
AV VELOCITY RATIO: 1.13
BASOPHILS # BLD AUTO: 0.02 X10(3)/MCL
BASOPHILS # BLD AUTO: 0.06 X10(3)/MCL
BASOPHILS NFR BLD AUTO: 0.1 %
BASOPHILS NFR BLD AUTO: 0.4 %
BILIRUB SERPL-MCNC: 1.9 MG/DL
BILIRUB SERPL-MCNC: 2.3 MG/DL
BNP BLD-MCNC: 13.2 PG/ML
BSA FOR ECHO PROCEDURE: 1.82 M2
BUN SERPL-MCNC: 15.1 MG/DL (ref 8.4–25.7)
BUN SERPL-MCNC: 15.6 MG/DL (ref 8.4–25.7)
BUN SERPL-MCNC: 20 MG/DL (ref 6–30)
CALCIUM SERPL-MCNC: 9.1 MG/DL (ref 8.4–10.2)
CALCIUM SERPL-MCNC: 9.1 MG/DL (ref 8.4–10.2)
CHLORIDE SERPL-SCNC: 104 MMOL/L (ref 95–110)
CHLORIDE SERPL-SCNC: 106 MMOL/L (ref 98–107)
CHLORIDE SERPL-SCNC: 108 MMOL/L (ref 98–107)
CO2 SERPL-SCNC: 18 MMOL/L (ref 22–29)
CO2 SERPL-SCNC: 22 MMOL/L (ref 22–29)
CREAT SERPL-MCNC: 1.2 MG/DL (ref 0.72–1.25)
CREAT SERPL-MCNC: 1.3 MG/DL (ref 0.5–1.4)
CREAT SERPL-MCNC: 1.32 MG/DL (ref 0.72–1.25)
CREAT/UREA NIT SERPL: 11
CREAT/UREA NIT SERPL: 13
CV ECHO LV RWT: 0.89 CM
DOP CALC AO PEAK VEL: 0.8 M/S
DOP CALC AO VTI: 12.7 CM
DOP CALC LVOT AREA: 3.1 CM2
DOP CALC LVOT DIAMETER: 2 CM
DOP CALC LVOT PEAK VEL: 0.9 M/S
DOP CALC LVOT STROKE VOLUME: 36.4 CM3
DOP CALC MV VTI: 17.8 CM
DOP CALCLVOT PEAK VEL VTI: 11.6 CM
E WAVE DECELERATION TIME: 145 MSEC
E/A RATIO: 0.79
E/E' RATIO: 5.16 M/S
ECHO LV POSTERIOR WALL: 1.7 CM (ref 0.6–1.1)
EOSINOPHIL # BLD AUTO: 0.01 X10(3)/MCL (ref 0–0.9)
EOSINOPHIL # BLD AUTO: 0.18 X10(3)/MCL (ref 0–0.9)
EOSINOPHIL NFR BLD AUTO: 0.1 %
EOSINOPHIL NFR BLD AUTO: 1.2 %
ERYTHROCYTE [DISTWIDTH] IN BLOOD BY AUTOMATED COUNT: 13.4 % (ref 11.5–17)
ERYTHROCYTE [DISTWIDTH] IN BLOOD BY AUTOMATED COUNT: 13.6 % (ref 11.5–17)
FRACTIONAL SHORTENING: 31.6 % (ref 28–44)
GFR SERPLBLD CREATININE-BSD FMLA CKD-EPI: >60 ML/MIN/1.73/M2
GFR SERPLBLD CREATININE-BSD FMLA CKD-EPI: >60 ML/MIN/1.73/M2
GLOBULIN SER-MCNC: 3.1 GM/DL (ref 2.4–3.5)
GLOBULIN SER-MCNC: 3.2 GM/DL (ref 2.4–3.5)
GLUCOSE SERPL-MCNC: 126 MG/DL (ref 74–100)
GLUCOSE SERPL-MCNC: 145 MG/DL (ref 70–110)
GLUCOSE SERPL-MCNC: 145 MG/DL (ref 74–100)
HCT VFR BLD AUTO: 46.4 % (ref 42–52)
HCT VFR BLD AUTO: 49.9 % (ref 42–52)
HCT VFR BLD CALC: 47 %PCV (ref 36–54)
HGB BLD-MCNC: 15.1 G/DL (ref 14–18)
HGB BLD-MCNC: 16 G/DL
HGB BLD-MCNC: 16 G/DL (ref 14–18)
HR MV ECHO: 103 BPM
IMM GRANULOCYTES # BLD AUTO: 0.05 X10(3)/MCL (ref 0–0.04)
IMM GRANULOCYTES # BLD AUTO: 0.05 X10(3)/MCL (ref 0–0.04)
IMM GRANULOCYTES NFR BLD AUTO: 0.3 %
IMM GRANULOCYTES NFR BLD AUTO: 0.3 %
INR PPP: 1.1
INTERVENTRICULAR SEPTUM: 1.6 CM (ref 0.6–1.1)
LACTATE SERPL-SCNC: 2.5 MMOL/L (ref 0.5–2.2)
LEFT ATRIUM AREA SYSTOLIC (APICAL 2 CHAMBER): 14 CM2
LEFT ATRIUM AREA SYSTOLIC (APICAL 4 CHAMBER): 12 CM2
LEFT ATRIUM SIZE: 3.6 CM
LEFT ATRIUM VOLUME INDEX MOD: 17.1 ML/M2
LEFT ATRIUM VOLUME MOD: 30.7 ML
LEFT INTERNAL DIMENSION IN SYSTOLE: 2.6 CM (ref 2.1–4)
LEFT VENTRICLE DIASTOLIC VOLUME INDEX: 34.44 ML/M2
LEFT VENTRICLE DIASTOLIC VOLUME: 62 ML
LEFT VENTRICLE END DIASTOLIC VOLUME APICAL 2 CHAMBER: 44.6 ML
LEFT VENTRICLE END DIASTOLIC VOLUME APICAL 4 CHAMBER: 39 ML
LEFT VENTRICLE END SYSTOLIC VOLUME APICAL 2 CHAMBER: 34.4 ML
LEFT VENTRICLE END SYSTOLIC VOLUME APICAL 4 CHAMBER: 26.1 ML
LEFT VENTRICLE MASS INDEX: 140.4 G/M2
LEFT VENTRICLE SYSTOLIC VOLUME INDEX: 13.3 ML/M2
LEFT VENTRICLE SYSTOLIC VOLUME: 23.9 ML
LEFT VENTRICULAR INTERNAL DIMENSION IN DIASTOLE: 3.8 CM (ref 3.5–6)
LEFT VENTRICULAR MASS: 252.7 G
LV LATERAL E/E' RATIO: 4.45 M/S
LV SEPTAL E/E' RATIO: 6.13 M/S
LVED V (TEICH): 62 ML
LVES V (TEICH): 23.9 ML
LVOT MG: 2 MMHG
LVOT MV: 0.56 CM/S
LYMPHOCYTES # BLD AUTO: 0.37 X10(3)/MCL (ref 0.6–4.6)
LYMPHOCYTES # BLD AUTO: 2.53 X10(3)/MCL (ref 0.6–4.6)
LYMPHOCYTES NFR BLD AUTO: 16.9 %
LYMPHOCYTES NFR BLD AUTO: 2.4 %
MAGNESIUM SERPL-MCNC: 2.1 MG/DL (ref 1.6–2.6)
MAGNESIUM SERPL-MCNC: 2.4 MG/DL (ref 1.6–2.6)
MCH RBC QN AUTO: 25.6 PG (ref 27–31)
MCH RBC QN AUTO: 26.3 PG (ref 27–31)
MCHC RBC AUTO-ENTMCNC: 32.1 G/DL (ref 33–36)
MCHC RBC AUTO-ENTMCNC: 32.5 G/DL (ref 33–36)
MCV RBC AUTO: 80 FL (ref 80–94)
MCV RBC AUTO: 80.7 FL (ref 80–94)
MONOCYTES # BLD AUTO: 0.36 X10(3)/MCL (ref 0.1–1.3)
MONOCYTES # BLD AUTO: 0.6 X10(3)/MCL (ref 0.1–1.3)
MONOCYTES NFR BLD AUTO: 2.4 %
MONOCYTES NFR BLD AUTO: 4 %
MV MEAN GRADIENT: 1 MMHG
MV PEAK A VEL: 0.62 M/S
MV PEAK E VEL: 0.49 M/S
MV PEAK GRADIENT: 3 MMHG
MV STENOSIS PRESSURE HALF TIME: 43 MS
MV VALVE AREA BY CONTINUITY EQUATION: 2.05 CM2
MV VALVE AREA P 1/2 METHOD: 5.12 CM2
NEUTROPHILS # BLD AUTO: 11.55 X10(3)/MCL (ref 2.1–9.2)
NEUTROPHILS # BLD AUTO: 14.49 X10(3)/MCL (ref 2.1–9.2)
NEUTROPHILS NFR BLD AUTO: 77.2 %
NEUTROPHILS NFR BLD AUTO: 94.7 %
NRBC BLD AUTO-RTO: 0 %
NRBC BLD AUTO-RTO: 0 %
OHS LV EJECTION FRACTION SIMPSONS BIPLANE MOD: 43 %
OHS QRS DURATION: 84 MS
OHS QTC CALCULATION: 406 MS
PHOSPHATE SERPL-MCNC: 4.1 MG/DL (ref 2.3–4.7)
PISA TR MAX VEL: 0.74 M/S
PLATELET # BLD AUTO: 274 X10(3)/MCL (ref 130–400)
PLATELET # BLD AUTO: 274 X10(3)/MCL (ref 130–400)
PMV BLD AUTO: 10.1 FL (ref 7.4–10.4)
PMV BLD AUTO: 10.4 FL (ref 7.4–10.4)
POC CARDIAC TROPONIN I: 0.01 NG/ML (ref 0–0.08)
POC IONIZED CALCIUM: 1.04 MMOL/L (ref 1.06–1.42)
POC TCO2 (MEASURED): 27 MMOL/L (ref 23–29)
POTASSIUM BLD-SCNC: 4.4 MMOL/L (ref 3.5–5.1)
POTASSIUM SERPL-SCNC: 3.6 MMOL/L (ref 3.5–5.1)
POTASSIUM SERPL-SCNC: 3.6 MMOL/L (ref 3.5–5.1)
PROT SERPL-MCNC: 7.1 GM/DL (ref 6.4–8.3)
PROT SERPL-MCNC: 7.2 GM/DL (ref 6.4–8.3)
PROTHROMBIN TIME: 14.1 SECONDS (ref 12.5–14.5)
PV PEAK GRADIENT: 3 MMHG
PV PEAK VELOCITY: 0.83 M/S
RA PRESSURE ESTIMATED: 3 MMHG
RBC # BLD AUTO: 5.75 X10(6)/MCL (ref 4.7–6.1)
RBC # BLD AUTO: 6.24 X10(6)/MCL (ref 4.7–6.1)
RV TB RVSP: 4 MMHG
SAMPLE: ABNORMAL
SAMPLE: NORMAL
SODIUM BLD-SCNC: 140 MMOL/L (ref 136–145)
SODIUM SERPL-SCNC: 140 MMOL/L (ref 136–145)
SODIUM SERPL-SCNC: 142 MMOL/L (ref 136–145)
TDI LATERAL: 0.11 M/S
TDI SEPTAL: 0.08 M/S
TDI: 0.1 M/S
TR MAX PG: 2 MMHG
TRICUSPID ANNULAR PLANE SYSTOLIC EXCURSION: 1.68 CM
TROPONIN I SERPL-MCNC: 436.88 NG/ML (ref 0–0.04)
TROPONIN I SERPL-MCNC: <0.01 NG/ML (ref 0–0.04)
TV REST PULMONARY ARTERY PRESSURE: 5 MMHG
WBC # BLD AUTO: 14.97 X10(3)/MCL (ref 4.5–11.5)
WBC # BLD AUTO: 15.3 X10(3)/MCL (ref 4.5–11.5)
Z-SCORE OF LEFT VENTRICULAR DIMENSION IN END DIASTOLE: -2.7
Z-SCORE OF LEFT VENTRICULAR DIMENSION IN END SYSTOLE: -1.33

## 2024-12-22 PROCEDURE — 93005 ELECTROCARDIOGRAM TRACING: CPT

## 2024-12-22 PROCEDURE — C1769 GUIDE WIRE: HCPCS | Performed by: INTERNAL MEDICINE

## 2024-12-22 PROCEDURE — 92978 ENDOLUMINL IVUS OCT C 1ST: CPT | Mod: LD | Performed by: INTERNAL MEDICINE

## 2024-12-22 PROCEDURE — 63600175 PHARM REV CODE 636 W HCPCS: Performed by: INTERNAL MEDICINE

## 2024-12-22 PROCEDURE — 80053 COMPREHEN METABOLIC PANEL: CPT | Performed by: INTERNAL MEDICINE

## 2024-12-22 PROCEDURE — 83605 ASSAY OF LACTIC ACID: CPT | Performed by: INTERNAL MEDICINE

## 2024-12-22 PROCEDURE — 63600175 PHARM REV CODE 636 W HCPCS

## 2024-12-22 PROCEDURE — 27100171 HC OXYGEN HIGH FLOW UP TO 24 HOURS

## 2024-12-22 PROCEDURE — 11000001 HC ACUTE MED/SURG PRIVATE ROOM

## 2024-12-22 PROCEDURE — 99900035 HC TECH TIME PER 15 MIN (STAT)

## 2024-12-22 PROCEDURE — 99153 MOD SED SAME PHYS/QHP EA: CPT | Performed by: INTERNAL MEDICINE

## 2024-12-22 PROCEDURE — 5A09357 ASSISTANCE WITH RESPIRATORY VENTILATION, LESS THAN 24 CONSECUTIVE HOURS, CONTINUOUS POSITIVE AIRWAY PRESSURE: ICD-10-PCS | Performed by: INTERNAL MEDICINE

## 2024-12-22 PROCEDURE — C9606 PERC D-E COR REVASC W AMI S: HCPCS | Mod: LD | Performed by: INTERNAL MEDICINE

## 2024-12-22 PROCEDURE — 99900031 HC PATIENT EDUCATION (STAT)

## 2024-12-22 PROCEDURE — 93010 ELECTROCARDIOGRAM REPORT: CPT | Mod: ,,, | Performed by: INTERNAL MEDICINE

## 2024-12-22 PROCEDURE — 36415 COLL VENOUS BLD VENIPUNCTURE: CPT | Performed by: INTERNAL MEDICINE

## 2024-12-22 PROCEDURE — C1874 STENT, COATED/COV W/DEL SYS: HCPCS | Performed by: INTERNAL MEDICINE

## 2024-12-22 PROCEDURE — 85025 COMPLETE CBC W/AUTO DIFF WBC: CPT | Performed by: STUDENT IN AN ORGANIZED HEALTH CARE EDUCATION/TRAINING PROGRAM

## 2024-12-22 PROCEDURE — 25500020 PHARM REV CODE 255: Performed by: INTERNAL MEDICINE

## 2024-12-22 PROCEDURE — 82565 ASSAY OF CREATININE: CPT

## 2024-12-22 PROCEDURE — C1725 CATH, TRANSLUMIN NON-LASER: HCPCS | Performed by: INTERNAL MEDICINE

## 2024-12-22 PROCEDURE — 80061 LIPID PANEL: CPT | Performed by: NURSE PRACTITIONER

## 2024-12-22 PROCEDURE — 83735 ASSAY OF MAGNESIUM: CPT | Performed by: INTERNAL MEDICINE

## 2024-12-22 PROCEDURE — B2151ZZ FLUOROSCOPY OF LEFT HEART USING LOW OSMOLAR CONTRAST: ICD-10-PCS | Performed by: INTERNAL MEDICINE

## 2024-12-22 PROCEDURE — 27000190 HC CPAP FULL FACE MASK W/VALVE

## 2024-12-22 PROCEDURE — 83735 ASSAY OF MAGNESIUM: CPT | Performed by: NURSE PRACTITIONER

## 2024-12-22 PROCEDURE — 25000003 PHARM REV CODE 250: Performed by: INTERNAL MEDICINE

## 2024-12-22 PROCEDURE — 96375 TX/PRO/DX INJ NEW DRUG ADDON: CPT

## 2024-12-22 PROCEDURE — 94660 CPAP INITIATION&MGMT: CPT

## 2024-12-22 PROCEDURE — 63600175 PHARM REV CODE 636 W HCPCS: Performed by: STUDENT IN AN ORGANIZED HEALTH CARE EDUCATION/TRAINING PROGRAM

## 2024-12-22 PROCEDURE — 85025 COMPLETE CBC W/AUTO DIFF WBC: CPT | Performed by: INTERNAL MEDICINE

## 2024-12-22 PROCEDURE — B2111ZZ FLUOROSCOPY OF MULTIPLE CORONARY ARTERIES USING LOW OSMOLAR CONTRAST: ICD-10-PCS | Performed by: INTERNAL MEDICINE

## 2024-12-22 PROCEDURE — 84484 ASSAY OF TROPONIN QUANT: CPT | Performed by: NURSE PRACTITIONER

## 2024-12-22 PROCEDURE — 85610 PROTHROMBIN TIME: CPT | Performed by: STUDENT IN AN ORGANIZED HEALTH CARE EDUCATION/TRAINING PROGRAM

## 2024-12-22 PROCEDURE — 84484 ASSAY OF TROPONIN QUANT: CPT | Performed by: STUDENT IN AN ORGANIZED HEALTH CARE EDUCATION/TRAINING PROGRAM

## 2024-12-22 PROCEDURE — 99291 CRITICAL CARE FIRST HOUR: CPT

## 2024-12-22 PROCEDURE — C1757 CATH, THROMBECTOMY/EMBOLECT: HCPCS | Performed by: INTERNAL MEDICINE

## 2024-12-22 PROCEDURE — 94760 N-INVAS EAR/PLS OXIMETRY 1: CPT

## 2024-12-22 PROCEDURE — 92973 PRQ TRLUML C MCHN ASP THRMBC: CPT | Mod: LD | Performed by: INTERNAL MEDICINE

## 2024-12-22 PROCEDURE — 82962 GLUCOSE BLOOD TEST: CPT

## 2024-12-22 PROCEDURE — 02C03ZZ EXTIRPATION OF MATTER FROM CORONARY ARTERY, ONE ARTERY, PERCUTANEOUS APPROACH: ICD-10-PCS | Performed by: INTERNAL MEDICINE

## 2024-12-22 PROCEDURE — 27201423 OPTIME MED/SURG SUP & DEVICES STERILE SUPPLY: Performed by: INTERNAL MEDICINE

## 2024-12-22 PROCEDURE — 80047 BASIC METABLC PNL IONIZED CA: CPT

## 2024-12-22 PROCEDURE — 80053 COMPREHEN METABOLIC PANEL: CPT | Performed by: STUDENT IN AN ORGANIZED HEALTH CARE EDUCATION/TRAINING PROGRAM

## 2024-12-22 PROCEDURE — 83880 ASSAY OF NATRIURETIC PEPTIDE: CPT | Performed by: STUDENT IN AN ORGANIZED HEALTH CARE EDUCATION/TRAINING PROGRAM

## 2024-12-22 PROCEDURE — 25000003 PHARM REV CODE 250: Performed by: STUDENT IN AN ORGANIZED HEALTH CARE EDUCATION/TRAINING PROGRAM

## 2024-12-22 PROCEDURE — 027034Z DILATION OF CORONARY ARTERY, ONE ARTERY WITH DRUG-ELUTING INTRALUMINAL DEVICE, PERCUTANEOUS APPROACH: ICD-10-PCS | Performed by: INTERNAL MEDICINE

## 2024-12-22 PROCEDURE — 25000003 PHARM REV CODE 250

## 2024-12-22 PROCEDURE — 96376 TX/PRO/DX INJ SAME DRUG ADON: CPT

## 2024-12-22 PROCEDURE — C1894 INTRO/SHEATH, NON-LASER: HCPCS | Performed by: INTERNAL MEDICINE

## 2024-12-22 PROCEDURE — C1887 CATHETER, GUIDING: HCPCS | Performed by: INTERNAL MEDICINE

## 2024-12-22 PROCEDURE — 99152 MOD SED SAME PHYS/QHP 5/>YRS: CPT | Performed by: INTERNAL MEDICINE

## 2024-12-22 PROCEDURE — 96365 THER/PROPH/DIAG IV INF INIT: CPT

## 2024-12-22 PROCEDURE — 93458 L HRT ARTERY/VENTRICLE ANGIO: CPT | Mod: XU | Performed by: INTERNAL MEDICINE

## 2024-12-22 PROCEDURE — B240ZZ3 ULTRASONOGRAPHY OF SINGLE CORONARY ARTERY, INTRAVASCULAR: ICD-10-PCS | Performed by: INTERNAL MEDICINE

## 2024-12-22 PROCEDURE — C1753 CATH, INTRAVAS ULTRASOUND: HCPCS | Performed by: INTERNAL MEDICINE

## 2024-12-22 PROCEDURE — 84100 ASSAY OF PHOSPHORUS: CPT | Performed by: INTERNAL MEDICINE

## 2024-12-22 PROCEDURE — 4A023N7 MEASUREMENT OF CARDIAC SAMPLING AND PRESSURE, LEFT HEART, PERCUTANEOUS APPROACH: ICD-10-PCS | Performed by: INTERNAL MEDICINE

## 2024-12-22 DEVICE — EVEROLIMUS-ELUTING PLATINUM CHROMIUM CORONARY STENT SYSTEM
Type: IMPLANTABLE DEVICE | Site: CORONARY | Status: FUNCTIONAL
Brand: SYNERGY™ XD

## 2024-12-22 RX ORDER — HEPARIN SODIUM 1000 [USP'U]/ML
INJECTION, SOLUTION INTRAVENOUS; SUBCUTANEOUS
Status: DISCONTINUED | OUTPATIENT
Start: 2024-12-22 | End: 2024-12-22 | Stop reason: HOSPADM

## 2024-12-22 RX ORDER — HEPARIN SODIUM 1000 [USP'U]/ML
1000 INJECTION, SOLUTION INTRAVENOUS; SUBCUTANEOUS ONCE
Status: COMPLETED | OUTPATIENT
Start: 2024-12-22 | End: 2024-12-22

## 2024-12-22 RX ORDER — HYDROCODONE BITARTRATE AND ACETAMINOPHEN 5; 325 MG/1; MG/1
1 TABLET ORAL EVERY 4 HOURS PRN
Status: DISCONTINUED | OUTPATIENT
Start: 2024-12-22 | End: 2024-12-25 | Stop reason: HOSPADM

## 2024-12-22 RX ORDER — POTASSIUM CHLORIDE 20 MEQ/1
40 TABLET, EXTENDED RELEASE ORAL ONCE
Status: COMPLETED | OUTPATIENT
Start: 2024-12-22 | End: 2024-12-22

## 2024-12-22 RX ORDER — ACETAMINOPHEN 325 MG/1
650 TABLET ORAL EVERY 4 HOURS PRN
Status: DISCONTINUED | OUTPATIENT
Start: 2024-12-22 | End: 2024-12-25 | Stop reason: HOSPADM

## 2024-12-22 RX ORDER — FENTANYL CITRATE 50 UG/ML
INJECTION, SOLUTION INTRAMUSCULAR; INTRAVENOUS
Status: DISCONTINUED | OUTPATIENT
Start: 2024-12-22 | End: 2024-12-22 | Stop reason: HOSPADM

## 2024-12-22 RX ORDER — ONDANSETRON HYDROCHLORIDE 2 MG/ML
4 INJECTION, SOLUTION INTRAVENOUS ONCE
Status: COMPLETED | OUTPATIENT
Start: 2024-12-22 | End: 2024-12-22

## 2024-12-22 RX ORDER — HEPARIN SODIUM,PORCINE/D5W 25000/250
0-40 INTRAVENOUS SOLUTION INTRAVENOUS CONTINUOUS
Status: DISCONTINUED | OUTPATIENT
Start: 2024-12-22 | End: 2024-12-22

## 2024-12-22 RX ORDER — PHENYLEPHRINE HCL IN 0.9% NACL 1 MG/10 ML
SYRINGE (ML) INTRAVENOUS
Status: DISCONTINUED | OUTPATIENT
Start: 2024-12-22 | End: 2024-12-22 | Stop reason: HOSPADM

## 2024-12-22 RX ORDER — FUROSEMIDE 10 MG/ML
INJECTION INTRAMUSCULAR; INTRAVENOUS
Status: DISCONTINUED | OUTPATIENT
Start: 2024-12-22 | End: 2024-12-22 | Stop reason: HOSPADM

## 2024-12-22 RX ORDER — NOREPINEPHRINE BITARTRATE/D5W 8 MG/250ML
PLASTIC BAG, INJECTION (ML) INTRAVENOUS
Status: DISCONTINUED | OUTPATIENT
Start: 2024-12-22 | End: 2024-12-25 | Stop reason: HOSPADM

## 2024-12-22 RX ORDER — HEPARIN SODIUM 5000 [USP'U]/ML
5000 INJECTION, SOLUTION INTRAVENOUS; SUBCUTANEOUS ONCE
Status: COMPLETED | OUTPATIENT
Start: 2024-12-22 | End: 2024-12-22

## 2024-12-22 RX ORDER — NOREPINEPHRINE BITARTRATE/D5W 8 MG/250ML
0-3 PLASTIC BAG, INJECTION (ML) INTRAVENOUS CONTINUOUS
Status: DISCONTINUED | OUTPATIENT
Start: 2024-12-22 | End: 2024-12-24

## 2024-12-22 RX ORDER — FENTANYL CITRATE 50 UG/ML
50 INJECTION, SOLUTION INTRAMUSCULAR; INTRAVENOUS
Status: COMPLETED | OUTPATIENT
Start: 2024-12-22 | End: 2024-12-22

## 2024-12-22 RX ORDER — VERAPAMIL HYDROCHLORIDE 2.5 MG/ML
INJECTION, SOLUTION INTRAVENOUS
Status: DISCONTINUED | OUTPATIENT
Start: 2024-12-22 | End: 2024-12-22 | Stop reason: HOSPADM

## 2024-12-22 RX ORDER — LIDOCAINE HYDROCHLORIDE 10 MG/ML
INJECTION, SOLUTION EPIDURAL; INFILTRATION; INTRACAUDAL; PERINEURAL
Status: DISCONTINUED | OUTPATIENT
Start: 2024-12-22 | End: 2024-12-22 | Stop reason: HOSPADM

## 2024-12-22 RX ORDER — MORPHINE SULFATE 4 MG/ML
4 INJECTION, SOLUTION INTRAMUSCULAR; INTRAVENOUS
Status: COMPLETED | OUTPATIENT
Start: 2024-12-22 | End: 2024-12-22

## 2024-12-22 RX ORDER — MORPHINE SULFATE 4 MG/ML
INJECTION, SOLUTION INTRAMUSCULAR; INTRAVENOUS
Status: COMPLETED
Start: 2024-12-22 | End: 2024-12-22

## 2024-12-22 RX ORDER — ASPIRIN 81 MG/1
81 TABLET ORAL DAILY
Status: DISCONTINUED | OUTPATIENT
Start: 2024-12-23 | End: 2024-12-25 | Stop reason: HOSPADM

## 2024-12-22 RX ORDER — FAMOTIDINE 10 MG/ML
20 INJECTION INTRAVENOUS
Status: COMPLETED | OUTPATIENT
Start: 2024-12-22 | End: 2024-12-22

## 2024-12-22 RX ORDER — ONDANSETRON HYDROCHLORIDE 2 MG/ML
4 INJECTION, SOLUTION INTRAVENOUS EVERY 8 HOURS PRN
Status: DISCONTINUED | OUTPATIENT
Start: 2024-12-22 | End: 2024-12-25 | Stop reason: HOSPADM

## 2024-12-22 RX ORDER — MORPHINE SULFATE 4 MG/ML
2 INJECTION, SOLUTION INTRAMUSCULAR; INTRAVENOUS EVERY 4 HOURS PRN
Status: DISCONTINUED | OUTPATIENT
Start: 2024-12-22 | End: 2024-12-25 | Stop reason: HOSPADM

## 2024-12-22 RX ORDER — METHYLPREDNISOLONE SOD SUCC 125 MG
125 VIAL (EA) INJECTION
Status: COMPLETED | OUTPATIENT
Start: 2024-12-22 | End: 2024-12-22

## 2024-12-22 RX ORDER — HEPARIN SODIUM 5000 [USP'U]/ML
5000 INJECTION, SOLUTION INTRAVENOUS; SUBCUTANEOUS ONCE
Status: DISCONTINUED | OUTPATIENT
Start: 2024-12-22 | End: 2024-12-22

## 2024-12-22 RX ORDER — NITROGLYCERIN 20 MG/100ML
INJECTION INTRAVENOUS
Status: DISCONTINUED | OUTPATIENT
Start: 2024-12-22 | End: 2024-12-22 | Stop reason: HOSPADM

## 2024-12-22 RX ORDER — ONDANSETRON HYDROCHLORIDE 2 MG/ML
INJECTION, SOLUTION INTRAVENOUS
Status: COMPLETED
Start: 2024-12-22 | End: 2024-12-22

## 2024-12-22 RX ORDER — DIPHENHYDRAMINE HYDROCHLORIDE 50 MG/ML
25 INJECTION INTRAMUSCULAR; INTRAVENOUS ONCE
Status: COMPLETED | OUTPATIENT
Start: 2024-12-22 | End: 2024-12-22

## 2024-12-22 RX ORDER — IOPAMIDOL 755 MG/ML
INJECTION, SOLUTION INTRAVASCULAR
Status: DISCONTINUED | OUTPATIENT
Start: 2024-12-22 | End: 2024-12-22 | Stop reason: HOSPADM

## 2024-12-22 RX ADMIN — FAMOTIDINE 20 MG: 10 INJECTION, SOLUTION INTRAVENOUS at 03:12

## 2024-12-22 RX ADMIN — NITROGLYCERIN 1 INCH: 20 OINTMENT TOPICAL at 03:12

## 2024-12-22 RX ADMIN — HEPARIN SODIUM 12 UNITS/KG/HR: 10000 INJECTION, SOLUTION INTRAVENOUS at 04:12

## 2024-12-22 RX ADMIN — HEPARIN SODIUM 1000 UNITS: 1000 INJECTION, SOLUTION INTRAVENOUS; SUBCUTANEOUS at 04:12

## 2024-12-22 RX ADMIN — DIPHENHYDRAMINE HYDROCHLORIDE 25 MG: 50 INJECTION INTRAMUSCULAR; INTRAVENOUS at 04:12

## 2024-12-22 RX ADMIN — TICAGRELOR 180 MG: 90 TABLET ORAL at 04:12

## 2024-12-22 RX ADMIN — TICAGRELOR 90 MG: 90 TABLET ORAL at 08:12

## 2024-12-22 RX ADMIN — Medication 8 MG: at 08:12

## 2024-12-22 RX ADMIN — MORPHINE SULFATE 4 MG: 4 INJECTION, SOLUTION INTRAMUSCULAR; INTRAVENOUS at 03:12

## 2024-12-22 RX ADMIN — METHYLPREDNISOLONE SODIUM SUCCINATE 125 MG: 125 INJECTION, POWDER, FOR SOLUTION INTRAMUSCULAR; INTRAVENOUS at 03:12

## 2024-12-22 RX ADMIN — HEPARIN SODIUM 5000 UNITS: 5000 INJECTION, SOLUTION INTRAVENOUS; SUBCUTANEOUS at 03:12

## 2024-12-22 RX ADMIN — NOREPINEPHRINE BITARTRATE 0.02 MCG/KG/MIN: 8 INJECTION, SOLUTION INTRAVENOUS at 08:12

## 2024-12-22 RX ADMIN — ONDANSETRON 4 MG: 2 INJECTION INTRAMUSCULAR; INTRAVENOUS at 03:12

## 2024-12-22 RX ADMIN — FENTANYL CITRATE 50 MCG: 50 INJECTION, SOLUTION INTRAMUSCULAR; INTRAVENOUS at 04:12

## 2024-12-22 RX ADMIN — POTASSIUM CHLORIDE 40 MEQ: 1500 TABLET, EXTENDED RELEASE ORAL at 10:12

## 2024-12-22 RX ADMIN — ONDANSETRON HYDROCHLORIDE 4 MG: 2 INJECTION, SOLUTION INTRAVENOUS at 03:12

## 2024-12-22 NOTE — CONSULTS
Inpatient consult to Cardiology  Consult performed by: Hadley Quispe ANP  Consult ordered by: Hadley Quispe ANP  Reason for consult: STEMI        [facility]    Cardiology  Consult Note    Patient Name: Canelo Blank  MRN: [id number]  Admission Date: 12/22/2024  Hospital Length of Stay: 0 days  Code Status: Prior   Attending Provider: Yariel Reina IV, MD   Consulting Provider: JAKUB Pickett  Primary Care Physician: No, Primary Doctor  Principal Problem:<principal problem not specified>    Patient information was obtained from patient, past medical records, and ER records.     Subjective:     Chief Complaint/Reason for Consult: CP/Abnormal EKG     HPI: Mr. Blank is a 48 y/o male who is unknown to [facility]. The patient presented to the ER on 12.22.24 with c/o CP. The patient reported that the CP started this morning while he was running for exercise and has been waxing and waning and was located to his anterior chest. The pain was described as a heaviness and rated 8/10 on verbal scale. The pain was associated with SOB and Diaphoresis.  Left arm has been tingling.   He had an EKG which revealed ST Elevations in the Anterior/Lateral Leads with Reciprocal Changes. A CODE STEMI was called and Cardiology was consulted for recommendations.     PMH: Subarachnoid Hemorrhage, Headaches  PSH: Appendectomy, L Finger Fracture Repair   Family History: Mother, L, HTN; Father, L, Esophageal Cancer  Social History: Denies Illicit Drug, ETOH and Tobacco Use     Previous Cardiac Diagnostics: None     Review of patient's allergies indicates:   Allergen Reactions    Shellfish containing products Anaphylaxis     No current facility-administered medications on file prior to encounter.     Current Outpatient Medications on File Prior to Encounter   Medication Sig    acetaminophen (TYLENOL) 325 MG tablet Take 325 mg by mouth every 6 (six) hours as needed for Pain.    fexofenadine (ALLEGRA) 180 MG tablet Take 180 mg by mouth  "once daily.    gabapentin (NEURONTIN) 300 MG capsule Take 1 capsule (300 mg total) by mouth every evening.     Review of Systems   Constitutional:  Positive for diaphoresis and fatigue.   Respiratory:  Positive for shortness of breath.    Cardiovascular:  Positive for chest pain. Negative for palpitations and leg swelling.   All other systems reviewed and are negative.    Objective:     Vital Signs (Most Recent):  Temp: 97.9 °F (36.6 °C) (12/22/24 1505)  Pulse: 100 (12/22/24 1521)  Resp: (!) 22 (12/22/24 1521)  BP: (!) 133/95 (12/22/24 1521)  SpO2: (!) 94 % (12/22/24 1521) Vital Signs (24h Range):  Temp:  [97.9 °F (36.6 °C)] 97.9 °F (36.6 °C)  Pulse:  [] 100  Resp:  [18-24] 22  SpO2:  [94 %-99 %] 94 %  BP: (122-135)/(82-95) 133/95   Weight: 72.6 kg (160 lb)  Body mass index is 26.63 kg/m².  SpO2: (!) 94 %     No intake or output data in the 24 hours ending 12/22/24 1525  Lines/Drains/Airways       Peripheral Intravenous Line  Duration                  Peripheral IV - Single Lumen 12/22/24 1505 18 G Right Antecubital <1 day                  Significant Labs:   Chemistries:   No results for input(s): "NA", "K", "CL", "CO2", "BUN", "CREATININE", "CALCIUM", "PROT", "BILITOT", "ALKPHOS", "ALT", "AST", "GLUCOSE", "MG", "PHOS", "TROPONINI" in the last 168 hours.    Invalid input(s): "LABALBU"     CBC/Anemia Labs: Coags:    Recent Labs   Lab 12/22/24  1514   HCT 47    No results for input(s): "PT", "INR", "APTT" in the last 168 hours.     Significant Imaging:  Imaging Results    None       EKG:       Telemetry: SR    Physical Exam  Constitutional:       General: He is not in acute distress.     Appearance: Normal appearance.   HENT:      Head: Normocephalic.      Mouth/Throat:      Mouth: Mucous membranes are moist.   Eyes:      Extraocular Movements: Extraocular movements intact.   Cardiovascular:      Rate and Rhythm: Normal rate and regular rhythm.      Pulses: Normal pulses.      Heart sounds: Murmur heard. "   Pulmonary:      Effort: Pulmonary effort is normal. No respiratory distress.      Breath sounds: Normal breath sounds.      Comments: RA  Abdominal:      Palpations: Abdomen is soft.   Skin:     General: Skin is warm and dry.   Neurological:      General: No focal deficit present.      Mental Status: He is alert and oriented to person, place, and time. Mental status is at baseline.   Psychiatric:         Mood and Affect: Mood normal.         Behavior: Behavior normal.         Judgment: Judgment normal.       Home Medications:   No current facility-administered medications on file prior to encounter.     Current Outpatient Medications on File Prior to Encounter   Medication Sig Dispense Refill    acetaminophen (TYLENOL) 325 MG tablet Take 325 mg by mouth every 6 (six) hours as needed for Pain.      fexofenadine (ALLEGRA) 180 MG tablet Take 180 mg by mouth once daily.      gabapentin (NEURONTIN) 300 MG capsule Take 1 capsule (300 mg total) by mouth every evening. 90 capsule 3     Current Schedule Inpatient Medications:   nitroGLYCERIN 2% TD oint         Continuous Infusions:    Assessment:   STEMI - Anterolateral Leads (Anterior Wall MI)  Shellfish Allergy  No Hx of GIB   H/O subarachnoid hemorrhage      Plan:   Agree with ASA, Nitro and Heparin Drip  Keep NPO  Schedule Emergent LHC   Trend Cardiac Biomarkers   ECHO Today  Labs and EKG in AM: CBC, CMP and Mg    Thank you for your consult.     JAKUB Pickett  Cardiology  [facility]                         --------------   CARDIOLOGY ATTENDING ADDENDUM   ---------------  12/22/2024 3:43 PM    I evaluated Canelo Blank.  The patient is a 49 y.o. male with:   Chief Complaint   Patient presents with    Chest Pain     C/o chest pain that began 1 hour ago during a 3 mile run. Chest pain worsened after getting home from run. Denies cardiac hx. Given 3 SL Nitro and 324 mg ASA in route per EMS. GCS 15 on arrival.          ROS    GEN   No fever  No  "weakness  RESP  + SOB  No wheezing  SKIN  No pruritus  No rash  CARD  + CP  No palpitations  VASC  No cyanosis  No claudication  HEM   No adenopathy  No bleeding  GI  No heart burn  No melena  NEURO  No dizziness  No syncope       Scheduled Medications      Blood pressure 120/87, pulse 95, temperature 97.9 °F (36.6 °C), temperature source Oral, resp. rate 13, height 5' 5" (1.651 m), weight 72.6 kg (160 lb), SpO2 100%.  PE    GEN  No acute distress  Not ill appearing  NECK  Normal carotid upstroke and volume  Supple  CV   Normal rate  Regular rhythm  No murmur  PUL  No respiratory distress  No wheezing  No crackles   Clear to auscultation bilaterally  ABD  No distention  No tenderness  LOW EXT   No deformity   No edema    SKIN  No bruising  No rash  NEURO  Awake and alert    No severe weakness in upper or lower extremities.       Labs  Last BMP BMP  Lab Results   Component Value Date     01/26/2023    K 3.7 01/26/2023     01/26/2023    CO2 28 01/26/2023    BUN 13.4 01/26/2023    CREATININE 1.12 01/26/2023    CALCIUM 9.5 01/26/2023    EGFRNORACEVR >60 01/26/2023      Last CBC     Lab Results   Component Value Date    WBC 14.97 (H) 12/22/2024    HGB 15.1 12/22/2024    HCT 46.4 12/22/2024    MCV 80.7 12/22/2024     12/22/2024           BNP  No results found for: "BNP"  Troponin No results found for: "TROPONINI"  Last lipids    Lab Results   Component Value Date    CHOL 178 08/08/2022    HDL 46 08/08/2022    .00 08/08/2022    TRIG 67 08/08/2022    TOTALCHOLEST 4 08/08/2022         Echo  No results found for this or any previous visit.    Stress test  No results found for this or any previous visit.    Coronary Angiogram  No results found for this or any previous visit.    Holter Monitor  No cardiac monitor results found for the past 12 months    Assessment/Plan  I agree with the assessment and plan as outlined above  Monitor rhythm and heart rate on telemetry  Labs:   trend troponin to peak "   Medications:   stressed importance of antiplatelet   Work up:   emergent inpatient C       Tee Bettencourt MD    Cardiologist

## 2024-12-22 NOTE — ED PROVIDER NOTES
Encounter Date: 12/22/2024    SCRIBE #1 NOTE: I, Kris Saenz, am scribing for, and in the presence of,  Yariel Reina IV, MD. I have scribed the following portions of the note - the EKG reading. Other sections scribed: HPI, ROS, PE.       History     Chief Complaint   Patient presents with    Chest Pain     C/o chest pain that began 1 hour ago during a 3 mile run. Chest pain worsened after getting home from run. Denies cardiac hx. Given 3 SL Nitro and 324 mg ASA in route per EMS. GCS 15 on arrival.      Patient is a 49 y.o. male with a PMHx of subarachnoid hemorrhage presenting to the ED with chest pain that onset prior to arrival. Per EMS, patient was running 3 miles and started to feel a pressure like chest pain that did not resolve when he stopped running. Patient called EMS after and they gave him 324 mg of aspirin en route. EMS states the pateint's blood pressure was normal but his EKG showed a STEMI.     Patient reports having a pressure chest pain that feels like someone sitting on his chest.     The history is provided by the patient and the EMS personnel. No  was used.     Review of patient's allergies indicates:   Allergen Reactions    Shellfish containing products Anaphylaxis     Past Medical History:   Diagnosis Date    Headache     Subarachnoid hemorrhage 2022     Past Surgical History:   Procedure Laterality Date    APPENDECTOMY      FRACTURE SURGERY Left     pinky finger     Family History   Problem Relation Name Age of Onset    Hypertension Mother      Esophageal cancer Father       Social History     Tobacco Use    Smoking status: Never     Passive exposure: Never    Smokeless tobacco: Never   Substance Use Topics    Alcohol use: Not Currently    Drug use: Never     Review of Systems   Constitutional:  Negative for chills and fever.   HENT:  Negative for congestion, rhinorrhea and sore throat.    Eyes:  Negative for visual disturbance.   Respiratory:  Negative for cough and  shortness of breath.    Cardiovascular:  Positive for chest pain.   Gastrointestinal:  Negative for abdominal pain, nausea and vomiting.   Genitourinary:  Negative for dysuria and hematuria.   Musculoskeletal:  Negative for joint swelling.   Skin:  Negative for rash.   Neurological:  Negative for weakness.   Psychiatric/Behavioral:  Negative for confusion.    All other systems reviewed and are negative.      Physical Exam     Initial Vitals   BP Pulse Resp Temp SpO2   12/22/24 1505 12/22/24 1504 12/22/24 1505 12/22/24 1505 12/22/24 1505   122/82 93 18 97.9 °F (36.6 °C) 99 %      MAP       --                Physical Exam    Nursing note and vitals reviewed.  Constitutional:   Uncomfortable appearing   HENT:   Head: Normocephalic and atraumatic.   Neck: Neck supple.   Normal range of motion.  Cardiovascular:  Normal rate and regular rhythm.           Pulmonary/Chest: Breath sounds normal.   Abdominal: Abdomen is soft. He exhibits no distension. There is no abdominal tenderness.   Musculoskeletal:         General: No edema.      Cervical back: Normal range of motion and neck supple.     Neurological: He is alert and oriented to person, place, and time. He has normal strength. No cranial nerve deficit or sensory deficit.   Skin: Skin is warm. Capillary refill takes less than 2 seconds.   Psychiatric: He has a normal mood and affect.         ED Course   Critical Care    Date/Time: 12/22/2024 4:56 PM    Performed by: Yariel Reina IV, MD  Authorized by: Yariel Reina IV, MD  Direct patient critical care time: 15 minutes  Additional history critical care time: 5 minutes  Ordering / reviewing critical care time: 5 minutes  Documentation critical care time: 5 minutes  Consulting other physicians critical care time: 5 minutes  Total critical care time (exclusive of procedural time) : 35 minutes  Critical care time was exclusive of separately billable procedures and treating other patients and teaching time.  Critical care  was necessary to treat or prevent imminent or life-threatening deterioration of the following conditions: cardiac failure.  Critical care was time spent personally by me on the following activities: development of treatment plan with patient or surrogate, discussions with consultants, interpretation of cardiac output measurements, evaluation of patient's response to treatment, examination of patient, obtaining history from patient or surrogate, ordering and performing treatments and interventions, ordering and review of laboratory studies, ordering and review of radiographic studies, pulse oximetry, re-evaluation of patient's condition and review of old charts.        Labs Reviewed   COMPREHENSIVE METABOLIC PANEL - Abnormal       Result Value    Sodium 142      Potassium 3.6      Chloride 106      CO2 22      Glucose 145 (*)     Blood Urea Nitrogen 15.1      Creatinine 1.32 (*)     Calcium 9.1      Protein Total 7.2      Albumin 4.1      Globulin 3.1      Albumin/Globulin Ratio 1.3      Bilirubin Total 1.9 (*)     ALP 56      ALT 16      AST 17      eGFR >60      Anion Gap 14.0      BUN/Creatinine Ratio 11     CBC WITH DIFFERENTIAL - Abnormal    WBC 14.97 (*)     RBC 5.75      Hgb 15.1      Hct 46.4      MCV 80.7      MCH 26.3 (*)     MCHC 32.5 (*)     RDW 13.4      Platelet 274      MPV 10.4      Neut % 77.2      Lymph % 16.9      Mono % 4.0      Eos % 1.2      Basophil % 0.4      Lymph # 2.53      Neut # 11.55 (*)     Mono # 0.60      Eos # 0.18      Baso # 0.06      IG# 0.05 (*)     IG% 0.3      NRBC% 0.0     ISTAT CHEM8 - Abnormal    POC Glucose 145 (*)     POC BUN 20      POC Creatinine 1.3      POC Sodium 140      POC Potassium 4.4      POC Chloride 104      POC TCO2 (MEASURED) 27      POC Anion Gap 15      POC Ionized Calcium 1.04 (*)     POC Hematocrit 47      POC HEMOGLOBIN 16      Sample VENOUS     PROTIME-INR - Normal    PT 14.1      INR 1.1     TROPONIN I - Normal    Troponin-I <0.010     B-TYPE  NATRIURETIC PEPTIDE - Normal    Natriuretic Peptide 13.2     CBC W/ AUTO DIFFERENTIAL    Narrative:     The following orders were created for panel order CBC auto differential.  Procedure                               Abnormality         Status                     ---------                               -----------         ------                     CBC with Differential[[id number]]       Abnormal            Final result                 Please view results for these tests on the individual orders.   TROPONIN ISTAT    POC Cardiac Troponin I 0.01      Sample VENOUS     TROPONIN I   ISTAT CHEM8   POCT TROPONIN     EKG Readings: (Independently Interpreted)   Initial Reading: STEMI.   Anterolateral ST-elevation reciprocal depressions consistent with the acute STEMI - time 3:05 p.m.     ECG Results              EKG 12-lead (Final result)        Collection Time Result Time QRS Duration OHS QTC Calculation    12/22/24 15:05:41 12/22/24 18:13:50 84 406                     Final result by Interface, Lab In [facility] (12/22/24 18:13:57)                   Narrative:    Test Reason : I21.3,    Vent. Rate :  84 BPM     Atrial Rate :  84 BPM     P-R Int : 192 ms          QRS Dur :  84 ms      QT Int : 344 ms       P-R-T Axes :  73  45  41 degrees    QTcB Int : 406 ms    Normal sinus rhythm  Anteroseptal infarct , new  Inferolateral injury pattern    ACUTE MI / STEMI    Abnormal ECG  When compared with ECG of 08-Aug-2022 05:24,  VA interval has decreased  Acute Anteroseptal infarct is now Present  Confirmed by Murtaza Matthews ([id number]) on 12/22/2024 6:13:48 PM    Referred By: AAAREFERRAL SELF           Confirmed By: Murtaza Matthews                                  Imaging Results              X-Ray Chest AP Portable (Final result)  Result time 12/22/24 16:47:21      Final result by Kalia Hollins MD (12/22/24 16:47:21)                   Impression:      No acute findings.      Electronically signed by: Kalia  Gunjan  Date:    12/22/2024  Time:    16:47               Narrative:    EXAMINATION:  XR CHEST AP PORTABLE    CLINICAL HISTORY:  ST elevation (STEMI) myocardial infarction of unspecified site    COMPARISON:  8 August 2022    FINDINGS:  Frontal view of the chest was obtained. The heart is not enlarged.  Grossly clear lungs.  No pneumothorax.                                       Medications   NORepinephrine 8 mg in dextrose 5% 250 mL infusion (8 mg  New Bag 12/22/24 2010)   acetaminophen tablet 650 mg (has no administration in time range)   HYDROcodone-acetaminophen 5-325 mg per tablet 1 tablet (has no administration in time range)   morphine injection 2 mg (has no administration in time range)   ondansetron injection 4 mg (has no administration in time range)   aspirin EC tablet 81 mg (has no administration in time range)   ticagrelor tablet 90 mg (90 mg Oral Given 12/22/24 2031)   NORepinephrine 8 mg in dextrose 5% 250 mL infusion (0.02 mcg/kg/min × 72.6 kg Intravenous New Bag 12/22/24 2012)   nitroGLYCERIN 2% TD oint ointment 1 inch (0 inches Transdermal Hold 12/22/24 1515)   heparin (porcine) injection 5,000 Units (5,000 Units Intravenous Given 12/22/24 1516)   ondansetron injection 4 mg (4 mg Intravenous Given 12/22/24 1531)   morphine injection 4 mg (4 mg Intravenous Given 12/22/24 1531)   morphine injection 4 mg (4 mg Intravenous Given 12/22/24 1553)   methylPREDNISolone sodium succinate injection 125 mg (125 mg Intravenous Given 12/22/24 1559)   diphenhydrAMINE injection 25 mg (25 mg Intravenous Given 12/22/24 1627)   famotidine (PF) injection 20 mg (20 mg Intravenous Given 12/22/24 1559)   ticagrelor tablet 180 mg (180 mg Oral Given 12/22/24 1628)   heparin (porcine) injection 1,000 Units (1,000 Units Intravenous Given 12/22/24 1628)   fentaNYL injection 50 mcg (50 mcg Intravenous Given 12/22/24 1631)   potassium chloride SA CR tablet 40 mEq (40 mEq Oral Given 12/22/24 2215)     Medical Decision  Making  49-year-old male presenting with midsternal chest pressure tightness shortness of breath that started while he was running  EKG reveals acute STEMI  Patient had spontaneous subarachnoid hemorrhage 2 years ago no aneurysm identified has completely recovered from this  Given acute STEMI code STEMI activated patient given heparin bolus and drip was given aspirin already by EMS cardiology will take the cath lab shortly     Differential diagnosis includes but is not limited to:  Judging by the patient's chief complaint and pertinent history, the patient has the following possible differential diagnoses, including but not limited to the following.  Some of these are deemed to be lower likelihood and some more likely based on my physical exam and history combined with possible lab work and/or imaging studies.   Please see the pertinent studies, and refer to the HPI.  Some of these diagnoses will take further evaluation to fully rule out, perhaps as an outpatient and the patient was encouraged to follow up when discharged for more comprehensive evaluation.    Chest pain emergent diagnoses: ACS, PE, dissection, cardiac tamponade, tension pneumothorax.    Chest pain non-emergent diagnoses: Musculoskeletal, trauma, pleurisy, pneumonia, pleural effusion, GERD, other GI, neurologic, psychiatric and other non emergent diagnoses considered.            Problems Addressed:  Acute ST elevation myocardial infarction (STEMI) of anterior wall: acute illness or injury that poses a threat to life or bodily functions  STEMI (ST elevation myocardial infarction): acute illness or injury that poses a threat to life or bodily functions    Amount and/or Complexity of Data Reviewed  Independent Historian: EMS     Details: See HPI  Labs: ordered.  Radiology: ordered and independent interpretation performed.     Details: CXR - no obvious infiltrates, consolidations, pleural effusions, or pneumothorax.      ECG/medicine tests: ordered and  independent interpretation performed.     Details: EMS EKG strip shows anterolateral segment ST elevation with reciprocal depression - c/w acute STEMI    EKG done at 15:05 shows same interpretation.  Discussion of management or test interpretation with external provider(s): Discussed with cardiology - will take to cath lab      Risk  Prescription drug management.  Decision regarding hospitalization.  Emergency major surgery.    Critical Care  Total time providing critical care: 35 minutes            Scribe Attestation:   Scribe #1: I performed the above scribed service and the documentation accurately describes the services I performed. I attest to the accuracy of the note.    Attending Attestation:           Physician Attestation for Scribe:  Physician Attestation Statement for Scribe #1: I, Yariel Reina IV, MD, reviewed documentation, as scribed by Kris Saenz in my presence, and it is both accurate and complete.             ED Course as of 12/22/24 2206   Sun Dec 22, 2024   1619 49-year-old male presenting with midsternal chest pressure tightness shortness of breath that started while he was running  EKG reveals acute STEMI  Patient had spontaneous subarachnoid hemorrhage 2 years ago no aneurysm identified has completely recovered from this  Given acute STEMI code STEMI activated patient given heparin bolus and drip was given aspirin already by EMS cardiology will take the cath lab shortly [AC]      ED Course User Index  [AC] Yariel Reina IV, MD                           Clinical Impression:  Final diagnoses:  [I21.3] STEMI (ST elevation myocardial infarction)  [I21.09] Acute ST elevation myocardial infarction (STEMI) of anterior wall          ED Disposition Condition    Admit Stable                Yariel Reina IV, MD  12/22/24 2206

## 2024-12-23 LAB
ALBUMIN SERPL-MCNC: 3.5 G/DL (ref 3.5–5)
ALBUMIN/GLOB SERPL: 1.2 RATIO (ref 1.1–2)
ALLENS TEST BLOOD GAS (OHS): ABNORMAL
ALP SERPL-CCNC: 52 UNIT/L (ref 40–150)
ALT SERPL-CCNC: 197 UNIT/L (ref 0–55)
ANION GAP SERPL CALC-SCNC: 13 MEQ/L
AST SERPL-CCNC: 883 UNIT/L (ref 5–34)
BASE EXCESS BLD CALC-SCNC: 1.8 MMOL/L (ref -2–2)
BASOPHILS # BLD AUTO: 0.02 X10(3)/MCL
BASOPHILS NFR BLD AUTO: 0.1 %
BILIRUB SERPL-MCNC: 1.7 MG/DL
BLOOD GAS SAMPLE TYPE (OHS): ABNORMAL
BUN SERPL-MCNC: 18.3 MG/DL (ref 8.4–25.7)
CA-I BLD-SCNC: 1.22 MMOL/L (ref 1.12–1.23)
CALCIUM SERPL-MCNC: 9.4 MG/DL (ref 8.4–10.2)
CHLORIDE SERPL-SCNC: 109 MMOL/L (ref 98–107)
CHOLEST SERPL-MCNC: 225 MG/DL
CHOLEST/HDLC SERPL: 4 {RATIO} (ref 0–5)
CO2 BLDA-SCNC: 28.6 MMOL/L
CO2 SERPL-SCNC: 16 MMOL/L (ref 22–29)
COHGB MFR BLDA: 0 % (ref 0.5–1.5)
CREAT SERPL-MCNC: 1.14 MG/DL (ref 0.72–1.25)
CREAT/UREA NIT SERPL: 16
DRAWN BY BLOOD GAS (OHS): ABNORMAL
EOSINOPHIL # BLD AUTO: 0 X10(3)/MCL (ref 0–0.9)
EOSINOPHIL NFR BLD AUTO: 0 %
ERYTHROCYTE [DISTWIDTH] IN BLOOD BY AUTOMATED COUNT: 13.5 % (ref 11.5–17)
EST. AVERAGE GLUCOSE BLD GHB EST-MCNC: 108.3 MG/DL
GFR SERPLBLD CREATININE-BSD FMLA CKD-EPI: >60 ML/MIN/1.73/M2
GLOBULIN SER-MCNC: 3 GM/DL (ref 2.4–3.5)
GLUCOSE SERPL-MCNC: 143 MG/DL (ref 74–100)
HBA1C MFR BLD: 5.4 %
HCO3 BLDA-SCNC: 27.2 MMOL/L (ref 22–26)
HCT VFR BLD AUTO: 44.9 % (ref 42–52)
HDLC SERPL-MCNC: 54 MG/DL (ref 35–60)
HGB BLD-MCNC: 15 G/DL (ref 14–18)
IMM GRANULOCYTES # BLD AUTO: 0.09 X10(3)/MCL (ref 0–0.04)
IMM GRANULOCYTES NFR BLD AUTO: 0.5 %
LACTATE SERPL-SCNC: 1.5 MMOL/L (ref 0.5–2.2)
LDLC SERPL CALC-MCNC: 161 MG/DL (ref 50–140)
LPM (OHS): 2
LYMPHOCYTES # BLD AUTO: 0.84 X10(3)/MCL (ref 0.6–4.6)
LYMPHOCYTES NFR BLD AUTO: 4.6 %
MAGNESIUM SERPL-MCNC: 2.2 MG/DL (ref 1.6–2.6)
MCH RBC QN AUTO: 26.2 PG (ref 27–31)
MCHC RBC AUTO-ENTMCNC: 33.4 G/DL (ref 33–36)
MCV RBC AUTO: 78.4 FL (ref 80–94)
METHGB MFR BLDA: 0.1 % (ref 0.4–1.5)
MONOCYTES # BLD AUTO: 0.73 X10(3)/MCL (ref 0.1–1.3)
MONOCYTES NFR BLD AUTO: 4 %
NEUTROPHILS # BLD AUTO: 16.51 X10(3)/MCL (ref 2.1–9.2)
NEUTROPHILS NFR BLD AUTO: 90.8 %
NRBC BLD AUTO-RTO: 0 %
O2 HB BLOOD GAS (OHS): 96.2 % (ref 94–97)
OHS QRS DURATION: 88 MS
OHS QRS DURATION: 90 MS
OHS QTC CALCULATION: 430 MS
OHS QTC CALCULATION: 437 MS
OXYGEN DEVICE BLOOD GAS (OHS): ABNORMAL
OXYHGB MFR BLDA: 11.6 G/DL (ref 12–16)
PCO2 BLDA: 45 MMHG (ref 35–45)
PH BLDA: 7.39 [PH] (ref 7.35–7.45)
PLATELET # BLD AUTO: 280 X10(3)/MCL (ref 130–400)
PMV BLD AUTO: 11 FL (ref 7.4–10.4)
PO2 BLDA: 108 MMHG (ref 80–100)
POCT GLUCOSE: 131 MG/DL (ref 70–110)
POTASSIUM BLOOD GAS (OHS): 3.5 MMOL/L (ref 3.5–5)
POTASSIUM SERPL-SCNC: 4.1 MMOL/L (ref 3.5–5.1)
PROT SERPL-MCNC: 6.5 GM/DL (ref 6.4–8.3)
RBC # BLD AUTO: 5.73 X10(6)/MCL (ref 4.7–6.1)
SAMPLE SITE BLOOD GAS (OHS): ABNORMAL
SAO2 % BLDA: 98.2 %
SODIUM BLOOD GAS (OHS): 138 MMOL/L (ref 137–145)
SODIUM SERPL-SCNC: 138 MMOL/L (ref 136–145)
TRIGL SERPL-MCNC: 51 MG/DL (ref 34–140)
TROPONIN I SERPL-MCNC: 313.69 NG/ML (ref 0–0.04)
TROPONIN I SERPL-MCNC: 362 NG/ML (ref 0–0.04)
TSH SERPL-ACNC: 0.52 UIU/ML (ref 0.35–4.94)
VLDLC SERPL CALC-MCNC: 10 MG/DL
WBC # BLD AUTO: 18.19 X10(3)/MCL (ref 4.5–11.5)

## 2024-12-23 PROCEDURE — 36415 COLL VENOUS BLD VENIPUNCTURE: CPT

## 2024-12-23 PROCEDURE — 11000001 HC ACUTE MED/SURG PRIVATE ROOM

## 2024-12-23 PROCEDURE — 36415 COLL VENOUS BLD VENIPUNCTURE: CPT | Performed by: NURSE PRACTITIONER

## 2024-12-23 PROCEDURE — 37799 UNLISTED PX VASCULAR SURGERY: CPT

## 2024-12-23 PROCEDURE — 83735 ASSAY OF MAGNESIUM: CPT | Performed by: NURSE PRACTITIONER

## 2024-12-23 PROCEDURE — 93010 ELECTROCARDIOGRAM REPORT: CPT | Mod: ,,, | Performed by: STUDENT IN AN ORGANIZED HEALTH CARE EDUCATION/TRAINING PROGRAM

## 2024-12-23 PROCEDURE — 85025 COMPLETE CBC W/AUTO DIFF WBC: CPT | Performed by: NURSE PRACTITIONER

## 2024-12-23 PROCEDURE — 82803 BLOOD GASES ANY COMBINATION: CPT

## 2024-12-23 PROCEDURE — 84484 ASSAY OF TROPONIN QUANT: CPT

## 2024-12-23 PROCEDURE — 84443 ASSAY THYROID STIM HORMONE: CPT | Performed by: NURSE PRACTITIONER

## 2024-12-23 PROCEDURE — 99900035 HC TECH TIME PER 15 MIN (STAT)

## 2024-12-23 PROCEDURE — 93005 ELECTROCARDIOGRAM TRACING: CPT

## 2024-12-23 PROCEDURE — 25000003 PHARM REV CODE 250: Performed by: NURSE PRACTITIONER

## 2024-12-23 PROCEDURE — 83036 HEMOGLOBIN GLYCOSYLATED A1C: CPT | Performed by: NURSE PRACTITIONER

## 2024-12-23 PROCEDURE — 80053 COMPREHEN METABOLIC PANEL: CPT | Performed by: NURSE PRACTITIONER

## 2024-12-23 PROCEDURE — 83605 ASSAY OF LACTIC ACID: CPT | Performed by: INTERNAL MEDICINE

## 2024-12-23 PROCEDURE — 84484 ASSAY OF TROPONIN QUANT: CPT | Performed by: NURSE PRACTITIONER

## 2024-12-23 PROCEDURE — 63600175 PHARM REV CODE 636 W HCPCS: Performed by: INTERNAL MEDICINE

## 2024-12-23 PROCEDURE — 25000003 PHARM REV CODE 250

## 2024-12-23 PROCEDURE — 25000003 PHARM REV CODE 250: Performed by: INTERNAL MEDICINE

## 2024-12-23 RX ORDER — DAPAGLIFLOZIN 10 MG/1
10 TABLET, FILM COATED ORAL DAILY
Status: DISCONTINUED | OUTPATIENT
Start: 2024-12-23 | End: 2024-12-25 | Stop reason: HOSPADM

## 2024-12-23 RX ORDER — METOPROLOL TARTRATE 1 MG/ML
2.5 INJECTION, SOLUTION INTRAVENOUS EVERY 4 HOURS PRN
Status: DISCONTINUED | OUTPATIENT
Start: 2024-12-23 | End: 2024-12-25 | Stop reason: HOSPADM

## 2024-12-23 RX ORDER — TALC
6 POWDER (GRAM) TOPICAL NIGHTLY PRN
Status: DISCONTINUED | OUTPATIENT
Start: 2024-12-23 | End: 2024-12-25 | Stop reason: HOSPADM

## 2024-12-23 RX ORDER — ATORVASTATIN CALCIUM 40 MG/1
40 TABLET, FILM COATED ORAL DAILY
Status: DISCONTINUED | OUTPATIENT
Start: 2024-12-23 | End: 2024-12-25 | Stop reason: HOSPADM

## 2024-12-23 RX ORDER — MUPIROCIN 20 MG/G
OINTMENT TOPICAL 2 TIMES DAILY
Status: DISCONTINUED | OUTPATIENT
Start: 2024-12-26 | End: 2024-12-25 | Stop reason: HOSPADM

## 2024-12-23 RX ADMIN — ONDANSETRON 4 MG: 2 INJECTION INTRAMUSCULAR; INTRAVENOUS at 04:12

## 2024-12-23 RX ADMIN — DAPAGLIFLOZIN 10 MG: 10 TABLET, FILM COATED ORAL at 11:12

## 2024-12-23 RX ADMIN — Medication 6 MG: at 09:12

## 2024-12-23 RX ADMIN — TICAGRELOR 90 MG: 90 TABLET ORAL at 09:12

## 2024-12-23 RX ADMIN — ASPIRIN 81 MG: 81 TABLET, COATED ORAL at 08:12

## 2024-12-23 RX ADMIN — ATORVASTATIN CALCIUM 40 MG: 40 TABLET, FILM COATED ORAL at 11:12

## 2024-12-23 RX ADMIN — TICAGRELOR 90 MG: 90 TABLET ORAL at 08:12

## 2024-12-23 NOTE — PROGRESS NOTES
[facility] - [facility] ICU  Pulmonary Critical Care Note    Patient Name: Canelo Blank  MRN: [id number]  Admission Date: 12/22/2024  Hospital Length of Stay: 1 days  Code Status: Prior  Attending Provider: Serafin Falcon MD  Primary Care Provider: Lori, Primary Doctor     Subjective:     HPI:   Canelo Blank is a 49 y.o. male with PMH of Subarachnoid hemorrhage (follows Neuro), who presented to the ED on 11/22/2024 with CC of chest pain. EKG revealed ST elevations in the Anterior/Lateral leads with Reciprocal change and so Cardiology was consulted. Patient was then taken to the Cath lab. Per nursing report, thrombectomy and stenting of the LAD was performed.   Patient is admitted to the ICU for close monitoring.    Hospital Course/Significant events:  12/22/2024 - Admitted to ICU     24 Hour Interval History:  No acute events overnight. Off pressors this morning. Monitoring blood pressures and MAP.  Labs this AM: WBC 18, chloride 109, bicarb 16, troponin down trending. Will repeat another trop. ABG 7.390/45/108.       Past Medical History:   Diagnosis Date    Headache     Subarachnoid hemorrhage 2022       Past Surgical History:   Procedure Laterality Date    APPENDECTOMY      FRACTURE SURGERY Left     pinky finger       Social History     Socioeconomic History    Marital status:    Tobacco Use    Smoking status: Never     Passive exposure: Never    Smokeless tobacco: Never   Substance and Sexual Activity    Alcohol use: Not Currently    Drug use: Never    Sexual activity: Yes     Partners: Female     Social Drivers of Health     Financial Resource Strain: Low Risk  (12/22/2024)    Overall Financial Resource Strain (CARDIA)     Difficulty of Paying Living Expenses: Not hard at all   Food Insecurity: No Food Insecurity (12/22/2024)    Hunger Vital Sign     Worried About Running Out of Food in the Last Year: Never true     Ran Out of Food in the Last Year: Never true   Transportation Needs: No  Transportation Needs (12/22/2024)    TRANSPORTATION NEEDS     Transportation : No   Stress: No Stress Concern Present (12/22/2024)    [employer] - Occupational Stress Questionnaire     Feeling of Stress : Only a little   Housing Stability: Low Risk  (12/22/2024)    Housing Stability Vital Sign     Unable to Pay for Housing in the Last Year: No     Homeless in the Last Year: No       Current Outpatient Medications   Medication Instructions    acetaminophen (TYLENOL) 325 mg, Every 6 hours PRN    fexofenadine (ALLEGRA) 180 mg, Daily    gabapentin (NEURONTIN) 300 mg, Oral, Nightly     Current Inpatient Medications   aspirin  81 mg Oral Daily    ticagrelor  90 mg Oral BID     Current Intravenous Infusions   NORepinephrine bitartrate-D5W    Continuous PRN 2.7 mL/hr at 12/22/24 2010 0.02 mcg/kg/min at 12/22/24 2010    NORepinephrine bitartrate-D5W  0-3 mcg/kg/min Intravenous Continuous 5.4 mL/hr at 12/23/24 0623 0.04 mcg/kg/min at 12/23/24 0623       Objective:     Intake/Output Summary (Last 24 hours) at 12/23/2024 0921  Last data filed at 12/23/2024 0623  Gross per 24 hour   Intake 44.88 ml   Output 900 ml   Net -855.12 ml     Vital Signs (Most Recent):  Temp: 99.5 °F (37.5 °C) (12/22/24 2000)  Pulse: 96 (12/23/24 0615)  Resp: 19 (12/23/24 0615)  BP: 96/70 (12/23/24 0600)  SpO2: (!) 93 % (12/23/24 0615)  Body mass index is 26.63 kg/m².  Weight: 72.6 kg (160 lb) Vital Signs (24h Range):  Temp:  [97.9 °F (36.6 °C)-99.5 °F (37.5 °C)] 99.5 °F (37.5 °C)  Pulse:  [] 96  Resp:  [13-32] 19  SpO2:  [90 %-100 %] 93 %  BP: ()/(55-95) 96/70  Arterial Line BP: ()/(36-66) 94/60     Physical Exam  Vitals reviewed.   Constitutional:       General: He is not in acute distress.     Appearance: He is not ill-appearing or toxic-appearing.   Cardiovascular:      Rate and Rhythm: Normal rate and regular rhythm.      Pulses: Normal pulses.      Heart sounds: Normal heart sounds. No murmur  heard.     No friction rub. No gallop.   Pulmonary:      Effort: Pulmonary effort is normal. No respiratory distress.      Breath sounds: Normal breath sounds. No wheezing or rhonchi.   Abdominal:      General: Abdomen is flat. There is no distension.      Palpations: Abdomen is soft.      Tenderness: There is no abdominal tenderness. There is no guarding.   Musculoskeletal:         General: No swelling or tenderness. Normal range of motion.   Skin:     General: Skin is warm.   Neurological:      General: No focal deficit present.      Mental Status: He is alert and oriented to person, place, and time.      Cranial Nerves: No cranial nerve deficit.      Sensory: No sensory deficit.      Motor: No weakness.         Lines/Drains/Airways       Arterial Line  Duration             Arterial Line 12/22/24 2300 Left Radial <1 day              Peripheral Intravenous Line  Duration                  Peripheral IV - Single Lumen 12/22/24 20 G Left Antecubital 1 day         Peripheral IV - Single Lumen 12/22/24 1505 18 G Right Antecubital <1 day                  Significant Labs:  Lab Results   Component Value Date    WBC 18.19 (H) 12/23/2024    HGB 15.0 12/23/2024    HCT 44.9 12/23/2024    MCV 78.4 (L) 12/23/2024     12/23/2024       BMP  Lab Results   Component Value Date     12/23/2024    K 4.1 12/23/2024    CO2 16 (L) 12/23/2024    BUN 18.3 12/23/2024    CREATININE 1.14 12/23/2024    CALCIUM 9.4 12/23/2024    AGAP 13.0 12/23/2024     ABG  Recent Labs   Lab 12/23/24  0640   PH 7.390   PO2 108.0*   PCO2 45.0   HCO3 27.2*     Mechanical Ventilation Support:  Oxygen Concentration (%): 60 (12/22/24 1823)    Significant Imaging:  I have reviewed the pertinent imaging within the past 24 hours.    Assessment/Plan:     Assessment  STEMI   S/p  thrombectomy and stenting of the LAD  Hx of subarachnoid hemorrhage  (follows Neuro outpatient)      Plan  - CT Surgery following  - Monitor pressures and MAP while off pressors.  Will plan to downgrade or discharge patient if pressures stable.  - Replete electrolytes as necessary  - Keep O2 sats > 94%  - Echo 12/22 revealed EF of 35-40%, akinetic ant wall apex setum and distal lateral c/w LAD occlusion.  - Continue ASA and Brilinta      DVT Prophylaxis: SCDs    32 minutes of critical care was time spent personally by me on the following activities: development of treatment plan with patient or surrogate and bedside caregivers, discussions with consultants, evaluation of patient's response to treatment, examination of patient, ordering and performing treatments and interventions, ordering and review of laboratory studies, ordering and review of radiographic studies, pulse oximetry, re-evaluation of patient's condition.  This critical care time did not overlap with that of any other provider or involve time for any procedures.       Maite Muller MD -2  Pulmonary Critical Care Medicine  [facility] - [facility] ICU

## 2024-12-23 NOTE — H&P
[facility] - [facility] ICU  Pulmonary Critical Care Note    Patient Name: Canelo Blank  MRN: [id number]  Admission Date: 12/22/2024  Hospital Length of Stay: 0 days  Code Status: Prior  Attending Provider: Osvaldo Dyer MD  Primary Care Provider: Lori, Primary Doctor     Subjective:     HPI:   Canelo Blank is a 49 y.o. male with PMH of Subarachnoid hemorrhage (follows Neuro), who presented to the ED on 11/22/2024 with CC of chest pain. EKG revealed ST elevations in the Anterior/Lateral leads with Reciprocal change and so Cardiology was consulted. Patient was then taken to the Cath lab. Per nursing report, thrombectomy and stenting of the LAD was performed. Official Op note not yet in.   Patient is admitted to the ICU for close monitoring.    Hospital Course/Significant events:  12/22/2024 - Admitted to ICU     24 Hour Interval History:  Pending    Past Medical History:   Diagnosis Date    Headache     Subarachnoid hemorrhage 2022       Past Surgical History:   Procedure Laterality Date    APPENDECTOMY      FRACTURE SURGERY Left     pinky finger       Social History     Socioeconomic History    Marital status:    Tobacco Use    Smoking status: Never     Passive exposure: Never    Smokeless tobacco: Never   Substance and Sexual Activity    Alcohol use: Not Currently    Drug use: Never    Sexual activity: Yes     Partners: Female       Current Outpatient Medications   Medication Instructions    acetaminophen (TYLENOL) 325 mg, Every 6 hours PRN    fexofenadine (ALLEGRA) 180 mg, Daily    gabapentin (NEURONTIN) 300 mg, Oral, Nightly     Current Inpatient Medications   [START ON 12/23/2024] aspirin  81 mg Oral Daily    ticagrelor  90 mg Oral BID     Current Intravenous Infusions   NORepinephrine bitartrate-D5W    Continuous PRN 5.4 mL/hr at 12/22/24 1742 0.04 mcg/kg/min at 12/22/24 1742    NORepinephrine bitartrate-D5W  0-3 mcg/kg/min Intravenous Continuous         ROS: neg unless stated above     "    Objective:   No intake or output data in the 24 hours ending 12/22/24 1859  Vital Signs (Most Recent):  Temp: 97.9 °F (36.6 °C) (12/22/24 1505)  Pulse: (!) 113 (12/22/24 1635)  Resp: 18 (12/22/24 1635)  BP: 118/84 (12/22/24 1635)  SpO2: (!) 91 % (12/22/24 1816)  Body mass index is 26.63 kg/m².  Weight: 72.6 kg (160 lb) Vital Signs (24h Range):  Temp:  [97.9 °F (36.6 °C)] 97.9 °F (36.6 °C)  Pulse:  [] 113  Resp:  [13-24] 18  SpO2:  [91 %-100 %] 91 %  BP: (116-135)/(82-95) 118/84     Physical Exam:  General: Well nourished w/o distress, resting comfortably in bed  Pulm: On BIPAP. CTA bilaterally, normal work of breathing  CV: S1, S2 w/o murmurs or gallops; no edema noted  GI: Bowel sound present in all quadrants, abdomen soft to palpation, non tender  MSK: Full ROM of all extremities w/o limitation or discomfort  Neuro: AAOx4; motor/sensory function intact, follows commands    Lines/Drains/Airways       Peripheral Intravenous Line  Duration                  Peripheral IV - Single Lumen 12/22/24 1505 18 G Right Antecubital <1 day         Peripheral IV - Single Lumen 12/22/24 20 G Left Antecubital <1 day                  Significant Labs:  Lab Results   Component Value Date    WBC 14.97 (H) 12/22/2024    HGB 15.1 12/22/2024    HCT 46.4 12/22/2024    MCV 80.7 12/22/2024     12/22/2024       BMP  Lab Results   Component Value Date     12/22/2024    K 3.6 12/22/2024    CO2 22 12/22/2024    BUN 15.1 12/22/2024    CREATININE 1.32 (H) 12/22/2024    CALCIUM 9.1 12/22/2024    AGAP 14.0 12/22/2024     ABG  No results for input(s): "PH", "PO2", "PCO2", "HCO3", "BE" in the last 168 hours.  Mechanical Ventilation Support:  Oxygen Concentration (%): 60 (12/22/24 1823)    Significant Imaging:  I have reviewed the pertinent imaging within the past 24 hours.    Assessment/Plan:     Assessment  STEMI   S/p  thrombectomy and stenting of the LAD  Hx of subarachnoid hemorrhage  (follows Neuro outpatient)    Plan  - " Admitted to ICU level of care for ongoing monitoring and medical management  - CT Surgery following  - Replete electrolytes as necessary  - Keep O2 sats > 94%  - Echo pending  - Will closely follow from a critical care aspect       DVT Prophylaxis: SCDs  GI Prophylaxis: Pepcid     32 minutes of critical care was time spent personally by me on the following activities: development of treatment plan with patient or surrogate and bedside caregivers, discussions with consultants, evaluation of patient's response to treatment, examination of patient, ordering and performing treatments and interventions, ordering and review of laboratory studies, ordering and review of radiographic studies, pulse oximetry, re-evaluation of patient's condition.  This critical care time did not overlap with that of any other provider or involve time for any procedures.       Maite Muller MD -2  Pulmonary Critical Care Medicine  [facility] - [facility] ICU

## 2024-12-23 NOTE — PROGRESS NOTES
"  [facility] *    Cardiology  Progress Note    Patient Name: Canelo Blank  MRN: [id number]  Admission Date: 12/22/2024  Hospital Length of Stay: 1 days  Code Status: Prior   Attending Provider: Serafin Falcon MD   Consulting Provider: JAKUB Pickett  Primary Care Physician: Lori, Primary Doctor  Principal Problem:Acute ST elevation myocardial infarction (STEMI) of anterior wall    Patient information was obtained from patient, past medical records, and ER records.     Subjective:     Chief Complaint/Reason for Consult: CP/Abnormal EKG     HPI: Mr. Blank is a 50 y/o male who is unknown to [facility]. The patient presented to the ER on 12.22.24 with c/o CP. The patient reported that the CP started this morning while he was running for exercise and has been waxing and waning and was located to his anterior chest. The pain was described as a heaviness and rated 8/10 on verbal scale. The pain was associated with SOB and Diaphoresis.  Left arm has been tingling.   He had an EKG which revealed ST Elevations in the Anterior/Lateral Leads with Reciprocal Changes. A CODE STEMI was called and Cardiology was consulted for recommendations.     12.23.24: NAD. S/P PCI of the Prox LAD. "I am ok, just tired." Denies CP, SOB and Palps.     PMH: Subarachnoid Hemorrhage, Headaches, CAD/Stents, ICMO/EF 35-40%  PSH: Appendectomy, L Finger Fracture Repair   Family History: Mother, L, HTN; Father, L, Esophageal Cancer  Social History: Denies Illicit Drug, ETOH and Tobacco Use     Previous Cardiac Diagnostics:   ECHO 12.22.24:  Left Ventricle: The left ventricle is normal in size. Moderately increased wall thickness. Regional wall motion abnormalities present.  akinetic ant wall apex setum and distal lateral c/w LAD occulsion There is moderately reduced systolic function with a visually estimated ejection fraction of 35 - 40%. Grade I diastolic dysfunction.  Right Ventricle: Normal right ventricular cavity size. Systolic function " is borderline low.  Aortic Valve: The aortic valve is a trileaflet valve.  Mitral Valve: Mildly calcified leaflets. There is trace regurgitation.  IVC/SVC: Normal venous pressure at 3 mmHg.  Pericardium: There is no pericardial effusion. Left pleural effusion.    [facility] 12.22.24:  Coronary findings:  Dominance: right   Left main:  No obstructive disease with less than 10% epicardial stenosis  Left anterior descending artery:  100% thrombotic occlusion in the proximal LAD with KENIA 0 flow.  Ramus intermedius vessel/high diagonal 1 branch:  No obstructive coronary artery disease with less than 10% epicardial stenosis  Circumflex artery:  No obstructive disease with less than 10% epicardial stenosis  Right coronary artery:  No obstructive disease with less than 10% epicardial stenosis  Impression:   Successful IVUS guided PTCA, mechanical thrombectomy and insertion of a drug-eluting stent to the proximal LAD from 100% thrombotic occlusion down to 0% residual stenosis and restoration of KENIA 3 flow  No obstructive coronary artery disease in the ramus intermedius/high diagonal 1, left circumflex coronary artery, RCA  Right dominant system  Elevated LVEDP of 30 mmHg   Plan:  Aspirin and Brilinta uninterrupted  IV Lasix  Echocardiogram  Monitor right radial access site    Review of patient's allergies indicates:   Allergen Reactions    Shellfish containing products Anaphylaxis     No current facility-administered medications on file prior to encounter.     Current Outpatient Medications on File Prior to Encounter   Medication Sig    fexofenadine (ALLEGRA) 180 MG tablet Take 180 mg by mouth once daily.    gabapentin (NEURONTIN) 300 MG capsule Take 1 capsule (300 mg total) by mouth every evening.    acetaminophen (TYLENOL) 325 MG tablet Take 325 mg by mouth every 6 (six) hours as needed for Pain.     Review of Systems   Constitutional:  Positive for fatigue. Negative for diaphoresis.   Respiratory:  Negative for cough and  shortness of breath.    Cardiovascular:  Negative for chest pain, palpitations and leg swelling.   All other systems reviewed and are negative.    Objective:     Vital Signs (Most Recent):  Temp: 99.5 °F (37.5 °C) (12/22/24 2000)  Pulse: 96 (12/23/24 0615)  Resp: 19 (12/23/24 0615)  BP: 96/70 (12/23/24 0600)  SpO2: (!) 93 % (12/23/24 0615) Vital Signs (24h Range):  Temp:  [97.9 °F (36.6 °C)-99.5 °F (37.5 °C)] 99.5 °F (37.5 °C)  Pulse:  [] 96  Resp:  [13-32] 19  SpO2:  [90 %-100 %] 93 %  BP: ()/(55-95) 96/70  Arterial Line BP: ()/(36-66) 94/60   Weight: 72.6 kg (160 lb)  Body mass index is 26.63 kg/m².  SpO2: (!) 93 %       Intake/Output Summary (Last 24 hours) at 12/23/2024 0939  Last data filed at 12/23/2024 0623  Gross per 24 hour   Intake 44.88 ml   Output 900 ml   Net -855.12 ml     Lines/Drains/Airways       Arterial Line  Duration             Arterial Line 12/22/24 2300 Left Radial <1 day              Peripheral Intravenous Line  Duration                  Peripheral IV - Single Lumen 12/22/24 20 G Left Antecubital 1 day         Peripheral IV - Single Lumen 12/22/24 1505 18 G Right Antecubital <1 day                  Significant Labs:   Chemistries:   Recent Labs   Lab 12/22/24  1517 12/22/24  2053 12/23/24  0355    140 138   K 3.6 3.6 4.1    108* 109*   CO2 22 18* 16*   BUN 15.1 15.6 18.3   CREATININE 1.32* 1.20 1.14   CALCIUM 9.1 9.1 9.4   BILITOT 1.9* 2.3* 1.7*   ALKPHOS 56 56 52   ALT 16 176* 197*   AST 17 889* 883*   GLUCOSE 145* 126* 143*   MG 2.10 2.40 2.20   PHOS  --  4.1  --    TROPONINI <0.010 436.881* 361.998*        CBC/Anemia Labs: Coags:    Recent Labs   Lab 12/22/24  1517 12/22/24  2053 12/23/24  0355   WBC 14.97* 15.30* 18.19*   HGB 15.1 16.0 15.0   HCT 46.4 49.9 44.9    274 280   MCV 80.7 80.0 78.4*   RDW 13.4 13.6 13.5    Recent Labs   Lab 12/22/24  1517   INR 1.1        Telemetry: SR    Physical Exam  Constitutional:       General: He is not in acute  distress.     Appearance: Normal appearance.   HENT:      Head: Normocephalic.      Mouth/Throat:      Mouth: Mucous membranes are dry.   Eyes:      Extraocular Movements: Extraocular movements intact.      Conjunctiva/sclera: Conjunctivae normal.   Cardiovascular:      Rate and Rhythm: Normal rate and regular rhythm.      Pulses: Normal pulses.      Heart sounds: Murmur heard.   Pulmonary:      Effort: Pulmonary effort is normal. No respiratory distress.      Breath sounds: Normal breath sounds.      Comments: RA  Abdominal:      Palpations: Abdomen is soft.   Musculoskeletal:         General: No swelling. Normal range of motion.   Skin:     General: Skin is warm.      Comments: R Radial Soft/Flat, Non-Tender, No Sign of Bleed/Infection. +2 Palpable Radial Pulse. Distal Cap Refill < 3 Seconds    Neurological:      General: No focal deficit present.      Mental Status: He is alert and oriented to person, place, and time.   Psychiatric:         Mood and Affect: Mood normal.         Behavior: Behavior normal.         Judgment: Judgment normal.       Home Medications:   No current facility-administered medications on file prior to encounter.     Current Outpatient Medications on File Prior to Encounter   Medication Sig Dispense Refill    fexofenadine (ALLEGRA) 180 MG tablet Take 180 mg by mouth once daily.      gabapentin (NEURONTIN) 300 MG capsule Take 1 capsule (300 mg total) by mouth every evening. 90 capsule 3    acetaminophen (TYLENOL) 325 MG tablet Take 325 mg by mouth every 6 (six) hours as needed for Pain.       Current Schedule Inpatient Medications:   aspirin  81 mg Oral Daily    ticagrelor  90 mg Oral BID     Continuous Infusions:   NORepinephrine bitartrate-D5W    Continuous PRN 2.7 mL/hr at 12/22/24 2010 0.02 mcg/kg/min at 12/22/24 2010    NORepinephrine bitartrate-D5W  0-3 mcg/kg/min Intravenous Continuous 5.4 mL/hr at 12/23/24 0623 0.04 mcg/kg/min at 12/23/24 0623     Assessment:   STEMI - Anterolateral  Leads (Anterior Wall MI)    - C (12.22.24) - Successful IVUS guided PTCA, Mechanical Thrombectomy and Insertion of a LUCA to the Prox LAD from 100% Thrombotic Occlusion Down to 0% residual stenosis and restoration of KENIA 3 Flow. No Obstructive CAD in the RI/High Diag 1, LCX, RCA; Right Dominant System    - Troponin Peak 436.881  CAD/Stents   ICMO/EF 35-40%    - ECHO (12.22.24) - LVEF 35-40%, Akinetic Anterior Wall [place] Septum and Distal Lateral  Hypotension requiring Pressors - Resolved    - Hx of HTN   HLD  Leukocytosis   Transaminitis  Lactic Acidosis - Resolved   Hx of Brain Aneurysm   Shell Fish Allergy  No Hx of GIB     Plan:   Continue ASA and Brilinta   Start Farxiga 10mg PO Daily   Start Atorvastatin 40mg PO Daily  Monitor LFTs  PT Recently Weaned off of Pressors, will add GDMT as BP Allows  Radial Precautions  Labs and EKG in AM: CBC, CMP and Mg    JAKUB Pickett  Cardiology  [facility]     I agree with the findings of the complexity of problems addressed and take responsibility for the plan's risks and complications. I approved the plan documented by Hadley Quispe NP.

## 2024-12-23 NOTE — PROCEDURES
"Canelo Blank is a 49 y.o. male patient.    Temp: 99.5 °F (37.5 °C) (12/22/24 2000)  Pulse: 106 (12/22/24 2030)  Resp: 15 (12/22/24 2030)  BP: 95/75 (12/22/24 2030)  SpO2: 96 % (12/22/24 2030)  Weight: 72.6 kg (160 lb) (12/22/24 1505)  Height: 5' 5" (165.1 cm) (12/22/24 1505)       Arterial Line    Date/Time: 12/22/2024 11:03 PM  Location procedure was performed: [facility] MEDICINE    Performed by: Clemencia Keith MD  Authorized by: Clemencia Keith MD  Consent Done: Yes  Consent: Verbal consent obtained. Written consent obtained.  Risks and benefits: risks, benefits and alternatives were discussed  Consent given by: patient  Patient understanding: patient states understanding of the procedure being performed  Preparation: Patient was prepped and draped in the usual sterile fashion.  Indications: hemodynamic monitoring  Location: left radial  Anesthesia: local infiltration    Anesthesia:  Local Anesthetic: lidocaine 1% without epinephrine  Anesthetic total: 3 mL  Camden's test normal: yes  Needle gauge: 20  Seldinger technique: Seldinger technique used  Number of attempts: 1  Complications: No  Estimated blood loss (mL): 5  Post-procedure: dressing applied  Post-procedure CMS: normal  Patient tolerance: Patient tolerated the procedure well with no immediate complications        Clemencia Keith MD   [facility] Internal Medicine HO2   12/22/2024    "

## 2024-12-24 LAB
ALBUMIN SERPL-MCNC: 3.5 G/DL (ref 3.5–5)
ALBUMIN/GLOB SERPL: 1.1 RATIO (ref 1.1–2)
ALP SERPL-CCNC: 48 UNIT/L (ref 40–150)
ALT SERPL-CCNC: 171 UNIT/L (ref 0–55)
ANION GAP SERPL CALC-SCNC: 14 MEQ/L
AST SERPL-CCNC: 485 UNIT/L (ref 5–34)
BASOPHILS # BLD AUTO: 0.03 X10(3)/MCL
BASOPHILS NFR BLD AUTO: 0.2 %
BILIRUB SERPL-MCNC: 2.7 MG/DL
BUN SERPL-MCNC: 18.7 MG/DL (ref 8.4–25.7)
CALCIUM SERPL-MCNC: 9.2 MG/DL (ref 8.4–10.2)
CHLORIDE SERPL-SCNC: 107 MMOL/L (ref 98–107)
CO2 SERPL-SCNC: 17 MMOL/L (ref 22–29)
CREAT SERPL-MCNC: 1.06 MG/DL (ref 0.72–1.25)
CREAT/UREA NIT SERPL: 18
EOSINOPHIL # BLD AUTO: 0 X10(3)/MCL (ref 0–0.9)
EOSINOPHIL NFR BLD AUTO: 0 %
ERYTHROCYTE [DISTWIDTH] IN BLOOD BY AUTOMATED COUNT: 13.8 % (ref 11.5–17)
GFR SERPLBLD CREATININE-BSD FMLA CKD-EPI: >60 ML/MIN/1.73/M2
GLOBULIN SER-MCNC: 3.2 GM/DL (ref 2.4–3.5)
GLUCOSE SERPL-MCNC: 98 MG/DL (ref 74–100)
HCT VFR BLD AUTO: 45.6 % (ref 42–52)
HGB BLD-MCNC: 15.1 G/DL (ref 14–18)
IMM GRANULOCYTES # BLD AUTO: 0.08 X10(3)/MCL (ref 0–0.04)
IMM GRANULOCYTES NFR BLD AUTO: 0.4 %
LYMPHOCYTES # BLD AUTO: 0.77 X10(3)/MCL (ref 0.6–4.6)
LYMPHOCYTES NFR BLD AUTO: 4.2 %
MAGNESIUM SERPL-MCNC: 2.3 MG/DL (ref 1.6–2.6)
MCH RBC QN AUTO: 26.2 PG (ref 27–31)
MCHC RBC AUTO-ENTMCNC: 33.1 G/DL (ref 33–36)
MCV RBC AUTO: 79 FL (ref 80–94)
MONOCYTES # BLD AUTO: 1.22 X10(3)/MCL (ref 0.1–1.3)
MONOCYTES NFR BLD AUTO: 6.7 %
NEUTROPHILS # BLD AUTO: 16.17 X10(3)/MCL (ref 2.1–9.2)
NEUTROPHILS NFR BLD AUTO: 88.5 %
NRBC BLD AUTO-RTO: 0 %
OHS QRS DURATION: 86 MS
OHS QTC CALCULATION: 424 MS
PLATELET # BLD AUTO: 196 X10(3)/MCL (ref 130–400)
PMV BLD AUTO: 10.9 FL (ref 7.4–10.4)
POTASSIUM SERPL-SCNC: 4.2 MMOL/L (ref 3.5–5.1)
PROT SERPL-MCNC: 6.7 GM/DL (ref 6.4–8.3)
RBC # BLD AUTO: 5.77 X10(6)/MCL (ref 4.7–6.1)
SODIUM SERPL-SCNC: 138 MMOL/L (ref 136–145)
WBC # BLD AUTO: 18.27 X10(3)/MCL (ref 4.5–11.5)

## 2024-12-24 PROCEDURE — 85025 COMPLETE CBC W/AUTO DIFF WBC: CPT | Performed by: NURSE PRACTITIONER

## 2024-12-24 PROCEDURE — 11000001 HC ACUTE MED/SURG PRIVATE ROOM

## 2024-12-24 PROCEDURE — 36415 COLL VENOUS BLD VENIPUNCTURE: CPT | Performed by: NURSE PRACTITIONER

## 2024-12-24 PROCEDURE — 25000003 PHARM REV CODE 250: Performed by: INTERNAL MEDICINE

## 2024-12-24 PROCEDURE — 83735 ASSAY OF MAGNESIUM: CPT | Performed by: NURSE PRACTITIONER

## 2024-12-24 PROCEDURE — 87070 CULTURE OTHR SPECIMN AEROBIC: CPT | Performed by: INTERNAL MEDICINE

## 2024-12-24 PROCEDURE — 93010 ELECTROCARDIOGRAM REPORT: CPT | Mod: ,,, | Performed by: INTERNAL MEDICINE

## 2024-12-24 PROCEDURE — 25000003 PHARM REV CODE 250: Performed by: NURSE PRACTITIONER

## 2024-12-24 PROCEDURE — 80053 COMPREHEN METABOLIC PANEL: CPT | Performed by: NURSE PRACTITIONER

## 2024-12-24 PROCEDURE — 93005 ELECTROCARDIOGRAM TRACING: CPT

## 2024-12-24 RX ADMIN — DAPAGLIFLOZIN 10 MG: 10 TABLET, FILM COATED ORAL at 08:12

## 2024-12-24 RX ADMIN — ATORVASTATIN CALCIUM 40 MG: 40 TABLET, FILM COATED ORAL at 08:12

## 2024-12-24 RX ADMIN — METOPROLOL SUCCINATE 12.5 MG: 25 TABLET, EXTENDED RELEASE ORAL at 10:12

## 2024-12-24 RX ADMIN — ASPIRIN 81 MG: 81 TABLET, COATED ORAL at 08:12

## 2024-12-24 RX ADMIN — TICAGRELOR 90 MG: 90 TABLET ORAL at 08:12

## 2024-12-24 NOTE — PLAN OF CARE
12/24/24 1137   Discharge Assessment   Assessment Type Discharge Planning Assessment   Confirmed/corrected address, phone number and insurance Yes   Confirmed Demographics Correct on Facesheet   Source of Information patient;family   Communicated CHERELLE with patient/caregiver Date not available/Unable to determine   Reason For Admission Acute ST elevation myocardial infarction   People in Home spouse;child(kayla), adult   Facility Arrived From: none   Do you expect to return to your current living situation? Yes   Do you have help at home or someone to help you manage your care at home? Yes   Who are your caregiver(s) and their phone number(s)? wife, Raquita and son   Prior to hospitilization cognitive status: Alert/Oriented   Current cognitive status: Alert/Oriented   Walking or Climbing Stairs Difficulty no   Dressing/Bathing Difficulty no   Home Accessibility wheelchair accessible   Equipment Currently Used at Home none   Patient currently being followed by outpatient case management? No   Do you currently have service(s) that help you manage your care at home? No   Do you take prescription medications? Yes   Do you have prescription coverage? Yes   Coverage BCBS   Do you have any problems affording any of your prescribed medications? No   Is the patient taking medications as prescribed? yes   Who is going to help you get home at discharge? wife   How do you get to doctors appointments? car, drives self   Are you on dialysis? No   Do you take coumadin? No   Discharge Plan A Home with family   Discharge Plan B Home   DME Needed Upon Discharge  none   Discharge Plan discussed with: Spouse/sig other;Patient   Transition of Care Barriers None   OTHER   Name(s) of People in Home wife, and 2 sons

## 2024-12-24 NOTE — PROGRESS NOTES
"  [facility] *    Cardiology  Progress Note    Patient Name: Canelo Blank  MRN: [id number]  Admission Date: 12/22/2024  Hospital Length of Stay: 2 days  Code Status: Prior   Attending Provider: Serafin Falcon MD   Consulting Provider: JAKUB Pickett  Primary Care Physician: Lori, Primary Doctor  Principal Problem:Acute ST elevation myocardial infarction (STEMI) of anterior wall    Patient information was obtained from patient, past medical records, and ER records.     Subjective:     Chief Complaint/Reason for Consult: CP/Abnormal EKG     HPI: Mr. Blank is a 50 y/o male who is unknown to [facility]. The patient presented to the ER on 12.22.24 with c/o CP. The patient reported that the CP started this morning while he was running for exercise and has been waxing and waning and was located to his anterior chest. The pain was described as a heaviness and rated 8/10 on verbal scale. The pain was associated with SOB and Diaphoresis.  Left arm has been tingling.   He had an EKG which revealed ST Elevations in the Anterior/Lateral Leads with Reciprocal Changes. A CODE STEMI was called and Cardiology was consulted for recommendations.     12.23.24: NAD. S/P PCI of the Prox LAD. "I am ok, just tired." Denies CP, SOB and Palps.   12.24.24: NAD. "I am doing well." Denies CP, SOB and Palps. BP remains Borderline at Times. ST 110s.     PMH: Subarachnoid Hemorrhage, Headaches, CAD/Stents, ICMO/EF 35-40%  PSH: Appendectomy, L Finger Fracture Repair   Family History: Mother, L, HTN; Father, L, Esophageal Cancer  Social History: Denies Illicit Drug, ETOH and Tobacco Use     Previous Cardiac Diagnostics:   ECHO 12.22.24:  Left Ventricle: The left ventricle is normal in size. Moderately increased wall thickness. Regional wall motion abnormalities present.  akinetic ant wall apex setum and distal lateral c/w LAD occulsion There is moderately reduced systolic function with a visually estimated ejection fraction of 35 - 40%. " Grade I diastolic dysfunction.  Right Ventricle: Normal right ventricular cavity size. Systolic function is borderline low.  Aortic Valve: The aortic valve is a trileaflet valve.  Mitral Valve: Mildly calcified leaflets. There is trace regurgitation.  IVC/SVC: Normal venous pressure at 3 mmHg.  Pericardium: There is no pericardial effusion. Left pleural effusion.    [facility] 12.22.24:  Coronary findings:  Dominance: right   Left main:  No obstructive disease with less than 10% epicardial stenosis  Left anterior descending artery:  100% thrombotic occlusion in the proximal LAD with KENIA 0 flow.  Ramus intermedius vessel/high diagonal 1 branch:  No obstructive coronary artery disease with less than 10% epicardial stenosis  Circumflex artery:  No obstructive disease with less than 10% epicardial stenosis  Right coronary artery:  No obstructive disease with less than 10% epicardial stenosis  Impression:   Successful IVUS guided PTCA, mechanical thrombectomy and insertion of a drug-eluting stent to the proximal LAD from 100% thrombotic occlusion down to 0% residual stenosis and restoration of KENIA 3 flow  No obstructive coronary artery disease in the ramus intermedius/high diagonal 1, left circumflex coronary artery, RCA  Right dominant system  Elevated LVEDP of 30 mmHg   Plan:  Aspirin and Brilinta uninterrupted  IV Lasix  Echocardiogram  Monitor right radial access site    Review of patient's allergies indicates:   Allergen Reactions    Shellfish containing products Anaphylaxis     No current facility-administered medications on file prior to encounter.     Current Outpatient Medications on File Prior to Encounter   Medication Sig    fexofenadine (ALLEGRA) 180 MG tablet Take 180 mg by mouth once daily.    gabapentin (NEURONTIN) 300 MG capsule Take 1 capsule (300 mg total) by mouth every evening.    acetaminophen (TYLENOL) 325 MG tablet Take 325 mg by mouth every 6 (six) hours as needed for Pain.     Review of Systems    Constitutional:  Negative for fatigue.   Respiratory:  Negative for shortness of breath.    Cardiovascular:  Negative for chest pain and palpitations.   All other systems reviewed and are negative.    Objective:     Vital Signs (Most Recent):  Temp: 98.6 °F (37 °C) (12/24/24 0800)  Pulse: (!) 111 (12/24/24 1130)  Resp: (!) 21 (12/24/24 1130)  BP: 113/76 (12/24/24 1100)  SpO2: 98 % (12/24/24 1130) Vital Signs (24h Range):  Temp:  [98.6 °F (37 °C)] 98.6 °F (37 °C)  Pulse:  [101-120] 111  Resp:  [17-41] 21  SpO2:  [80 %-98 %] 98 %  BP: ()/(53-86) 113/76  Arterial Line BP: (103)/(65) 103/65   Weight: 72.6 kg (160 lb)  Body mass index is 26.63 kg/m².  SpO2: 98 %       Intake/Output Summary (Last 24 hours) at 12/24/2024 1203  Last data filed at 12/24/2024 0830  Gross per 24 hour   Intake 309.24 ml   Output 1275 ml   Net -965.76 ml     Lines/Drains/Airways       Peripheral Intravenous Line  Duration                  Peripheral IV - Single Lumen 12/22/24 20 G Left Antecubital 2 days         Peripheral IV - Single Lumen 12/22/24 1505 18 G Right Antecubital 1 day                  Significant Labs:   Chemistries:   Recent Labs   Lab 12/22/24  1517 12/22/24  2053 12/23/24  0355 12/23/24  0944 12/24/24  0333    140 138  --  138   K 3.6 3.6 4.1  --  4.2    108* 109*  --  107   CO2 22 18* 16*  --  17*   BUN 15.1 15.6 18.3  --  18.7   CREATININE 1.32* 1.20 1.14  --  1.06   CALCIUM 9.1 9.1 9.4  --  9.2   BILITOT 1.9* 2.3* 1.7*  --  2.7*   ALKPHOS 56 56 52  --  48   ALT 16 176* 197*  --  171*   AST 17 889* 883*  --  485*   GLUCOSE 145* 126* 143*  --  98   MG 2.10 2.40 2.20  --  2.30   PHOS  --  4.1  --   --   --    TROPONINI <0.010 436.881* 361.998* 313.695*  --         CBC/Anemia Labs: Coags:    Recent Labs   Lab 12/22/24  2053 12/23/24  0355 12/24/24  0333   WBC 15.30* 18.19* 18.27*   HGB 16.0 15.0 15.1   HCT 49.9 44.9 45.6    280 196   MCV 80.0 78.4* 79.0*   RDW 13.6 13.5 13.8    Recent Labs   Lab  12/22/24  1517   INR 1.1        Telemetry: SR    Physical Exam  Constitutional:       General: He is not in acute distress.     Appearance: Normal appearance. He is not ill-appearing.   HENT:      Head: Normocephalic.      Mouth/Throat:      Mouth: Mucous membranes are moist.   Eyes:      Extraocular Movements: Extraocular movements intact.   Cardiovascular:      Rate and Rhythm: Normal rate and regular rhythm.      Pulses: Normal pulses.      Heart sounds: Murmur heard.   Pulmonary:      Effort: Pulmonary effort is normal. No respiratory distress.      Breath sounds: Normal breath sounds.      Comments: RA  Abdominal:      Palpations: Abdomen is soft.   Musculoskeletal:         General: No swelling. Normal range of motion.   Skin:     General: Skin is warm.      Comments: R Radial Soft/Flat, Non-Tender, No Sign of Bleed/Infection. +2 Palpable Radial Pulse. Distal Cap Refill < 3 Seconds    Neurological:      General: No focal deficit present.      Mental Status: He is alert and oriented to person, place, and time. Mental status is at baseline.   Psychiatric:         Mood and Affect: Mood normal.         Behavior: Behavior normal.         Judgment: Judgment normal.       Home Medications:   No current facility-administered medications on file prior to encounter.     Current Outpatient Medications on File Prior to Encounter   Medication Sig Dispense Refill    fexofenadine (ALLEGRA) 180 MG tablet Take 180 mg by mouth once daily.      gabapentin (NEURONTIN) 300 MG capsule Take 1 capsule (300 mg total) by mouth every evening. 90 capsule 3    acetaminophen (TYLENOL) 325 MG tablet Take 325 mg by mouth every 6 (six) hours as needed for Pain.       Current Schedule Inpatient Medications:   aspirin  81 mg Oral Daily    atorvastatin  40 mg Oral Daily    dapagliflozin propanediol  10 mg Oral Daily    metoprolol succinate  12.5 mg Oral Daily    [START ON 12/26/2024] mupirocin   Nasal BID    ticagrelor  90 mg Oral BID      Continuous Infusions:   NORepinephrine bitartrate-D5W    Continuous PRN 2.7 mL/hr at 12/22/24 2010 0.02 mcg/kg/min at 12/22/24 2010    NORepinephrine bitartrate-D5W  0-3 mcg/kg/min Intravenous Continuous   Stopped at 12/24/24 0216     Assessment:   STEMI - Anterolateral Leads (Anterior Wall MI)    - LHC (12.22.24) - Successful IVUS guided PTCA, Mechanical Thrombectomy and Insertion of a LUCA to the Prox LAD from 100% Thrombotic Occlusion Down to 0% residual stenosis and restoration of KENIA 3 Flow. No Obstructive CAD in the RI/High Diag 1, LCX, RCA; Right Dominant System    - Troponin Peak 436.881  CAD/Stents   ICMO/EF 35-40%    - ECHO (12.22.24) - LVEF 35-40%, Akinetic Anterior Wall [place] Septum and Distal Lateral  Hypotension requiring Pressors - Resolved    - Hx of HTN   HLD  Leukocytosis   Transaminitis - Improving   Lactic Acidosis - Resolved   Hx of Brain Aneurysm   Shell Fish Allergy  No Hx of GIB     Plan:   Continue ASA, Farxiga, Atorvastatin and Brilinta   Start Toprol XL 12.5mg PO Qday with Hold Parameters  Up out of Bed and Ambulating in [place]  Ok to Downgrade to Cardiology Services   D/C Home in AM if PT Tolerating GDMT  Labs in AM: CBC, CMP and Mg    Hadley Quispe, JAKUB  Cardiology  [facility]   Physician addendum:  I have seen and examined this patient as a split-shared visit with the HAROON d/t complicated medical management of above problems written in assessment and high acuity requiring physician expertise in medical decision-making. I performed the substantive portion of the history and exam. Above medical decision-making is also formulated by me.    Cardiovascular exam:  S1, S2  Lungs:  fine crackles at bases.  Extremities:  + edema bilaterally    Plan:  Medications as above.  Continue supportive therapy.     Richar Ibarra MD  Cardiologist

## 2024-12-24 NOTE — PROGRESS NOTES
[facility] - [facility] ICU  Pulmonary Critical Care Note    Patient Name: Canelo Blank  MRN: [id number]  Admission Date: 12/22/2024  Hospital Length of Stay: 2 days  Code Status: Prior  Attending Provider: Serafin Falcon MD  Primary Care Provider: Lori, Primary Doctor     Subjective:     HPI:   Canelo Blank is a 49 y.o. male with PMH of Subarachnoid hemorrhage (follows Neuro), who presented to the ED on 11/22/2024 with CC of chest pain. EKG revealed ST elevations in the Anterior/Lateral leads with Reciprocal change and so Cardiology was consulted. Patient was then taken to the Cath lab. Per nursing report, thrombectomy and stenting of the LAD was performed.   Patient is admitted to the ICU for close monitoring.    Hospital Course/Significant events:  12/22/2024 - Admitted to ICU     24 Hour Interval History:  No acute events overnight, tachycardic and tachypneic. Weaned off of pressors. Resting in bed comfortably and reports he is asymptomatic currently. Did reports mild lightheadedness upon standing from seated position yesterday but has resolved. Leukocytosis persistent WBC 18. Transaminitis downtrending. Stable for DG to floor.       Past Medical History:   Diagnosis Date    Headache     Subarachnoid hemorrhage 2022       Past Surgical History:   Procedure Laterality Date    APPENDECTOMY      FRACTURE SURGERY Left     pinky finger       Social History     Socioeconomic History    Marital status:    Tobacco Use    Smoking status: Never     Passive exposure: Never    Smokeless tobacco: Never   Substance and Sexual Activity    Alcohol use: Not Currently    Drug use: Never    Sexual activity: Yes     Partners: Female     Social Drivers of Health     Financial Resource Strain: Low Risk  (12/22/2024)    Overall Financial Resource Strain (CARDIA)     Difficulty of Paying Living Expenses: Not hard at all   Food Insecurity: No Food Insecurity (12/22/2024)    Hunger Vital Sign     Worried About Running  Out of Food in the Last Year: Never true     Ran Out of Food in the Last Year: Never true   Transportation Needs: No Transportation Needs (12/22/2024)    TRANSPORTATION NEEDS     Transportation : No   Stress: No Stress Concern Present (12/22/2024)    [employer] - Occupational Stress Questionnaire     Feeling of Stress : Only a little   Housing Stability: Low Risk  (12/22/2024)    Housing Stability Vital Sign     Unable to Pay for Housing in the Last Year: No     Homeless in the Last Year: No       Current Outpatient Medications   Medication Instructions    acetaminophen (TYLENOL) 325 mg, Every 6 hours PRN    fexofenadine (ALLEGRA) 180 mg, Daily    gabapentin (NEURONTIN) 300 mg, Oral, Nightly     Current Inpatient Medications   aspirin  81 mg Oral Daily    atorvastatin  40 mg Oral Daily    dapagliflozin propanediol  10 mg Oral Daily    metoprolol succinate  12.5 mg Oral Daily    [START ON 12/26/2024] mupirocin   Nasal BID    ticagrelor  90 mg Oral BID     Current Intravenous Infusions   NORepinephrine bitartrate-D5W    Continuous PRN 2.7 mL/hr at 12/22/24 2010 0.02 mcg/kg/min at 12/22/24 2010    NORepinephrine bitartrate-D5W  0-3 mcg/kg/min Intravenous Continuous   Stopped at 12/24/24 0216       Objective:     Intake/Output Summary (Last 24 hours) at 12/24/2024 1112  Last data filed at 12/24/2024 0830  Gross per 24 hour   Intake 309.24 ml   Output 1275 ml   Net -965.76 ml     Vital Signs (Most Recent):  Temp: 98.6 °F (37 °C) (12/24/24 0800)  Pulse: (!) 116 (12/24/24 1045)  Resp: (!) 28 (12/24/24 1045)  BP: 105/80 (12/24/24 1033)  SpO2: (!) 94 % (12/24/24 1045)  Body mass index is 26.63 kg/m².  Weight: 72.6 kg (160 lb) Vital Signs (24h Range):  Temp:  [98.6 °F (37 °C)] 98.6 °F (37 °C)  Pulse:  [101-120] 116  Resp:  [17-41] 28  SpO2:  [80 %-97 %] 94 %  BP: ()/(53-86) 105/80  Arterial Line BP: (103)/(65) 103/65     Physical Exam  Vitals reviewed.   Constitutional:       General: He is  not in acute distress.     Appearance: He is not ill-appearing or toxic-appearing.   Cardiovascular:      Rate and Rhythm: Normal rate and regular rhythm.      Pulses: Normal pulses.      Heart sounds: Normal heart sounds. No murmur heard.     No friction rub. No gallop.   Pulmonary:      Effort: Pulmonary effort is normal. No respiratory distress.      Breath sounds: Normal breath sounds. No wheezing or rhonchi.   Abdominal:      General: Abdomen is flat. There is no distension.      Palpations: Abdomen is soft.      Tenderness: There is no abdominal tenderness. There is no guarding.   Musculoskeletal:         General: No swelling or tenderness. Normal range of motion.   Skin:     General: Skin is warm.   Neurological:      General: No focal deficit present.      Mental Status: He is alert and oriented to person, place, and time.      Cranial Nerves: No cranial nerve deficit.      Sensory: No sensory deficit.      Motor: No weakness.         Lines/Drains/Airways       Peripheral Intravenous Line  Duration                  Peripheral IV - Single Lumen 12/22/24 20 G Left Antecubital 2 days         Peripheral IV - Single Lumen 12/22/24 1505 18 G Right Antecubital 1 day                  Significant Labs:  Lab Results   Component Value Date    WBC 18.27 (H) 12/24/2024    HGB 15.1 12/24/2024    HCT 45.6 12/24/2024    MCV 79.0 (L) 12/24/2024     12/24/2024       BMP  Lab Results   Component Value Date     12/24/2024    K 4.2 12/24/2024    CO2 17 (L) 12/24/2024    BUN 18.7 12/24/2024    CREATININE 1.06 12/24/2024    CALCIUM 9.2 12/24/2024    AGAP 14.0 12/24/2024     ABG  Recent Labs   Lab 12/23/24  0640   PH 7.390   PO2 108.0*   PCO2 45.0   HCO3 27.2*     Mechanical Ventilation Support:  Oxygen Concentration (%): 60 (12/22/24 1823)    Significant Imaging:  I have reviewed the pertinent imaging within the past 24 hours.    Assessment/Plan:     Assessment  STEMI   S/p  thrombectomy and stenting of the LAD  HFrEF  EF 35-40% w Grade I Diastolic dysfunction and trace MR   Hx of subarachnoid hemorrhage  (follows Neuro outpatient)      Plan  - Stable to downgrade to floor today, no longer requiring pressors   - Cardiology following, appreciate assistance   - Continue ASA 81 mg qd and Brilinta 90mg qd   - Initiate GDMT as tolerated and Lipitor 40mg qd   - Daily AM labs, keep K>4, Phos>3 and Mg>2, replete as needed     Code Status: Full Code   Abx: None   Fluids: None   DVT ppx: SCDs  GI ppx: None     32 minutes of critical care was time spent personally by me on the following activities: development of treatment plan with patient or surrogate and bedside caregivers, discussions with consultants, evaluation of patient's response to treatment, examination of patient, ordering and performing treatments and interventions, ordering and review of laboratory studies, ordering and review of radiographic studies, pulse oximetry, re-evaluation of patient's condition.  This critical care time did not overlap with that of any other provider or involve time for any procedures.       Clemencia Keith MD   Pulmonary Critical Care Medicine  [facility] - [facility] ICU

## 2024-12-25 VITALS
HEART RATE: 105 BPM | BODY MASS INDEX: 26.66 KG/M2 | WEIGHT: 160 LBS | TEMPERATURE: 98 F | DIASTOLIC BLOOD PRESSURE: 74 MMHG | SYSTOLIC BLOOD PRESSURE: 105 MMHG | HEIGHT: 65 IN | RESPIRATION RATE: 18 BRPM | OXYGEN SATURATION: 97 %

## 2024-12-25 LAB
ALBUMIN SERPL-MCNC: 3.1 G/DL (ref 3.5–5)
ALBUMIN/GLOB SERPL: 0.9 RATIO (ref 1.1–2)
ALP SERPL-CCNC: 42 UNIT/L (ref 40–150)
ALT SERPL-CCNC: 106 UNIT/L (ref 0–55)
ANION GAP SERPL CALC-SCNC: 13 MEQ/L
AST SERPL-CCNC: 185 UNIT/L (ref 5–34)
BASOPHILS # BLD AUTO: 0.01 X10(3)/MCL
BASOPHILS NFR BLD AUTO: 0.1 %
BILIRUB SERPL-MCNC: 2.8 MG/DL
BUN SERPL-MCNC: 23.3 MG/DL (ref 8.4–25.7)
CALCIUM SERPL-MCNC: 8.6 MG/DL (ref 8.4–10.2)
CHLORIDE SERPL-SCNC: 104 MMOL/L (ref 98–107)
CO2 SERPL-SCNC: 19 MMOL/L (ref 22–29)
CREAT SERPL-MCNC: 0.89 MG/DL (ref 0.72–1.25)
CREAT/UREA NIT SERPL: 26
EOSINOPHIL # BLD AUTO: 0.01 X10(3)/MCL (ref 0–0.9)
EOSINOPHIL NFR BLD AUTO: 0.1 %
ERYTHROCYTE [DISTWIDTH] IN BLOOD BY AUTOMATED COUNT: 13.6 % (ref 11.5–17)
GFR SERPLBLD CREATININE-BSD FMLA CKD-EPI: >60 ML/MIN/1.73/M2
GLOBULIN SER-MCNC: 3.4 GM/DL (ref 2.4–3.5)
GLUCOSE SERPL-MCNC: 103 MG/DL (ref 74–100)
HCT VFR BLD AUTO: 41.8 % (ref 42–52)
HGB BLD-MCNC: 13.8 G/DL (ref 14–18)
IMM GRANULOCYTES # BLD AUTO: 0.04 X10(3)/MCL (ref 0–0.04)
IMM GRANULOCYTES NFR BLD AUTO: 0.3 %
LYMPHOCYTES # BLD AUTO: 0.71 X10(3)/MCL (ref 0.6–4.6)
LYMPHOCYTES NFR BLD AUTO: 5.2 %
MAGNESIUM SERPL-MCNC: 2.4 MG/DL (ref 1.6–2.6)
MCH RBC QN AUTO: 26 PG (ref 27–31)
MCHC RBC AUTO-ENTMCNC: 33 G/DL (ref 33–36)
MCV RBC AUTO: 78.9 FL (ref 80–94)
MONOCYTES # BLD AUTO: 1.01 X10(3)/MCL (ref 0.1–1.3)
MONOCYTES NFR BLD AUTO: 7.5 %
NEUTROPHILS # BLD AUTO: 11.77 X10(3)/MCL (ref 2.1–9.2)
NEUTROPHILS NFR BLD AUTO: 86.8 %
NRBC BLD AUTO-RTO: 0 %
PLATELET # BLD AUTO: 202 X10(3)/MCL (ref 130–400)
PMV BLD AUTO: 10.9 FL (ref 7.4–10.4)
POTASSIUM SERPL-SCNC: 3.7 MMOL/L (ref 3.5–5.1)
PROT SERPL-MCNC: 6.5 GM/DL (ref 6.4–8.3)
RBC # BLD AUTO: 5.3 X10(6)/MCL (ref 4.7–6.1)
SODIUM SERPL-SCNC: 136 MMOL/L (ref 136–145)
WBC # BLD AUTO: 13.55 X10(3)/MCL (ref 4.5–11.5)

## 2024-12-25 PROCEDURE — 25000003 PHARM REV CODE 250: Performed by: NURSE PRACTITIONER

## 2024-12-25 PROCEDURE — 85025 COMPLETE CBC W/AUTO DIFF WBC: CPT | Performed by: INTERNAL MEDICINE

## 2024-12-25 PROCEDURE — 63600175 PHARM REV CODE 636 W HCPCS: Performed by: NURSE PRACTITIONER

## 2024-12-25 PROCEDURE — 93010 ELECTROCARDIOGRAM REPORT: CPT | Mod: 76,,, | Performed by: INTERNAL MEDICINE

## 2024-12-25 PROCEDURE — 25000003 PHARM REV CODE 250

## 2024-12-25 PROCEDURE — 93005 ELECTROCARDIOGRAM TRACING: CPT

## 2024-12-25 PROCEDURE — 80053 COMPREHEN METABOLIC PANEL: CPT | Performed by: NURSE PRACTITIONER

## 2024-12-25 PROCEDURE — 25000003 PHARM REV CODE 250: Performed by: INTERNAL MEDICINE

## 2024-12-25 PROCEDURE — 63600175 PHARM REV CODE 636 W HCPCS: Performed by: INTERNAL MEDICINE

## 2024-12-25 PROCEDURE — 93010 ELECTROCARDIOGRAM REPORT: CPT | Mod: ,,, | Performed by: INTERNAL MEDICINE

## 2024-12-25 PROCEDURE — 83735 ASSAY OF MAGNESIUM: CPT | Performed by: NURSE PRACTITIONER

## 2024-12-25 PROCEDURE — 36415 COLL VENOUS BLD VENIPUNCTURE: CPT | Performed by: INTERNAL MEDICINE

## 2024-12-25 RX ORDER — METOPROLOL SUCCINATE 25 MG/1
25 TABLET, EXTENDED RELEASE ORAL DAILY
Qty: 90 TABLET | Refills: 3 | Status: SHIPPED | OUTPATIENT
Start: 2024-12-26 | End: 2025-12-26

## 2024-12-25 RX ORDER — SUCRALFATE 1 G/1
1 TABLET ORAL
Status: DISCONTINUED | OUTPATIENT
Start: 2024-12-25 | End: 2024-12-25 | Stop reason: HOSPADM

## 2024-12-25 RX ORDER — PANTOPRAZOLE SODIUM 40 MG/10ML
40 INJECTION, POWDER, LYOPHILIZED, FOR SOLUTION INTRAVENOUS ONCE
Status: COMPLETED | OUTPATIENT
Start: 2024-12-25 | End: 2024-12-25

## 2024-12-25 RX ORDER — PANTOPRAZOLE SODIUM 40 MG/1
TABLET, DELAYED RELEASE ORAL
Qty: 88 TABLET | Refills: 0 | Status: SHIPPED | OUTPATIENT
Start: 2024-12-25 | End: 2025-03-09

## 2024-12-25 RX ORDER — SUCRALFATE 1 G/1
1 TABLET ORAL
Qty: 120 TABLET | Refills: 0 | Status: SHIPPED | OUTPATIENT
Start: 2024-12-25

## 2024-12-25 RX ORDER — METOPROLOL SUCCINATE 25 MG/1
25 TABLET, EXTENDED RELEASE ORAL DAILY
Status: DISCONTINUED | OUTPATIENT
Start: 2024-12-26 | End: 2024-12-25

## 2024-12-25 RX ORDER — PANTOPRAZOLE SODIUM 40 MG/1
40 TABLET, DELAYED RELEASE ORAL
Status: DISCONTINUED | OUTPATIENT
Start: 2024-12-25 | End: 2024-12-25 | Stop reason: HOSPADM

## 2024-12-25 RX ORDER — ASPIRIN 81 MG/1
81 TABLET ORAL DAILY
Qty: 30 TABLET | Refills: 11 | Status: SHIPPED | OUTPATIENT
Start: 2024-12-26 | End: 2025-12-26

## 2024-12-25 RX ORDER — ATORVASTATIN CALCIUM 40 MG/1
40 TABLET, FILM COATED ORAL DAILY
Qty: 90 TABLET | Refills: 3 | Status: SHIPPED | OUTPATIENT
Start: 2024-12-26 | End: 2025-12-26

## 2024-12-25 RX ORDER — METOPROLOL SUCCINATE 25 MG/1
25 TABLET, EXTENDED RELEASE ORAL DAILY
Status: DISCONTINUED | OUTPATIENT
Start: 2024-12-25 | End: 2024-12-25 | Stop reason: HOSPADM

## 2024-12-25 RX ORDER — DAPAGLIFLOZIN 10 MG/1
10 TABLET, FILM COATED ORAL DAILY
Qty: 30 TABLET | Refills: 11 | Status: SHIPPED | OUTPATIENT
Start: 2024-12-26

## 2024-12-25 RX ADMIN — ATORVASTATIN CALCIUM 40 MG: 40 TABLET, FILM COATED ORAL at 09:12

## 2024-12-25 RX ADMIN — PANTOPRAZOLE SODIUM 40 MG: 40 TABLET, DELAYED RELEASE ORAL at 09:12

## 2024-12-25 RX ADMIN — SUCRALFATE 1 G: 1 TABLET ORAL at 09:12

## 2024-12-25 RX ADMIN — METOPROLOL SUCCINATE 25 MG: 25 TABLET, EXTENDED RELEASE ORAL at 09:12

## 2024-12-25 RX ADMIN — ASPIRIN 81 MG: 81 TABLET, COATED ORAL at 09:12

## 2024-12-25 RX ADMIN — DAPAGLIFLOZIN 10 MG: 10 TABLET, FILM COATED ORAL at 09:12

## 2024-12-25 RX ADMIN — ONDANSETRON 4 MG: 2 INJECTION INTRAMUSCULAR; INTRAVENOUS at 04:12

## 2024-12-25 RX ADMIN — PANTOPRAZOLE SODIUM 40 MG: 40 INJECTION, POWDER, LYOPHILIZED, FOR SOLUTION INTRAVENOUS at 04:12

## 2024-12-25 RX ADMIN — TICAGRELOR 90 MG: 90 TABLET ORAL at 09:12

## 2024-12-25 NOTE — DISCHARGE SUMMARY
"[facility] *    Cardiology  Discharge Summary      Patient Name: Canelo Blank  MRN: [id number]  Admission Date: 12/22/2024  Hospital Length of Stay: 3 days  Discharge Date and Time:  12/25/2024 1:48 PM  Attending Physician: Richar Ibarra MD  Discharging Provider: NE Calderon  Primary Care Physician: Lori, Primary Doctor    HPI/Hospital Course:   Mr. Blank is a 48 y/o male who is unknown to [facility]. The patient presented to the ER on 12.22.24 with c/o CP. The patient reported that the CP started this morning while he was running for exercise and has been waxing and waning and was located to his anterior chest. The pain was described as a heaviness and rated 8/10 on verbal scale. The pain was associated with SOB and Diaphoresis.  Left arm has been tingling.   He had an EKG which revealed ST Elevations in the Anterior/Lateral Leads with Reciprocal Changes. A CODE STEMI was called and Cardiology was consulted for recommendations.      12.23.24: NAD. S/P PCI of the Prox LAD. "I am ok, just tired." Denies CP, SOB and Palps.   12.24.24: NAD. "I am doing well." Denies CP, SOB and Palps. BP remains Borderline at Times. ST 110s.   12.25.24: AND. VSS. No complaint of CP/SOB/Palps. ST on telemetry. Patient reports emesis last night - suspected coffee ground - GI Cleared patient     PMH: Subarachnoid Hemorrhage, Headaches, CAD/Stents, ICMO/EF 35-40%  PSH: Appendectomy, L Finger Fracture Repair   Family History: Mother, L, HTN; Father, L, Esophageal Cancer  Social History: Denies Illicit Drug, ETOH and Tobacco Use      Previous Cardiac Diagnostics:   ECHO 12.22.24:  Left Ventricle: The left ventricle is normal in size. Moderately increased wall thickness. Regional wall motion abnormalities present.  akinetic ant wall apex setum and distal lateral c/w LAD occulsion There is moderately reduced systolic function with a visually estimated ejection fraction of 35 - 40%. Grade I diastolic dysfunction.  Right Ventricle: Normal " right ventricular cavity size. Systolic function is borderline low.  Aortic Valve: The aortic valve is a trileaflet valve.  Mitral Valve: Mildly calcified leaflets. There is trace regurgitation.  IVC/SVC: Normal venous pressure at 3 mmHg.  Pericardium: There is no pericardial effusion. Left pleural effusion.     [facility] 12.22.24:  Coronary findings:  Dominance: right   Left main:  No obstructive disease with less than 10% epicardial stenosis  Left anterior descending artery:  100% thrombotic occlusion in the proximal LAD with KENIA 0 flow.  Ramus intermedius vessel/high diagonal 1 branch:  No obstructive coronary artery disease with less than 10% epicardial stenosis  Circumflex artery:  No obstructive disease with less than 10% epicardial stenosis  Right coronary artery:  No obstructive disease with less than 10% epicardial stenosis  Impression:   Successful IVUS guided PTCA, mechanical thrombectomy and insertion of a drug-eluting stent to the proximal LAD from 100% thrombotic occlusion down to 0% residual stenosis and restoration of KENIA 3 flow  No obstructive coronary artery disease in the ramus intermedius/high diagonal 1, left circumflex coronary artery, RCA  Right dominant system  Elevated LVEDP of 30 mmHg   Plan:  Aspirin and Brilinta uninterrupted  IV Lasix  Echocardiogram  Monitor right radial access site    Procedure(s) (LRB):  IVUS, Coronary (N/A)  Angiogram, Coronary, with Left Heart Cath  Thrombectomy, Coronary  INSERTION, STENT, CORONARY ARTERY (N/A)   Consults:   Consults (From admission, onward)          Status Ordering Provider     Inpatient consult to Gastroenterology  Once        Provider:  Mohit Sanchez MD    Completed JACKY HILL     Inpatient consult to Cardiology  Once        Provider:  Osvaldo Dyer MD    Completed JESSE MIXON          Final Active Diagnoses:    Diagnosis Date Noted POA    PRINCIPAL PROBLEM:  Acute ST elevation myocardial infarction (STEMI) of anterior  mirella [I21.09] 12/22/2024 Yes      Problems Resolved During this Admission:     Discharged Condition: good  Review of Systems   Cardiovascular:  Negative for chest pain, dyspnea on exertion, leg swelling and palpitations.   Respiratory:  Negative for shortness of breath.    All other systems reviewed and are negative.    Physical Exam  Vitals and nursing note reviewed.   HENT:      Head: Normocephalic.      Nose: Nose normal.      Mouth/Throat:      Mouth: Mucous membranes are dry.   Eyes:      Extraocular Movements: Extraocular movements intact.   Cardiovascular:      Rate and Rhythm: Regular rhythm. Tachycardia present.      Pulses: Normal pulses.      Heart sounds: Murmur heard.   Pulmonary:      Effort: Pulmonary effort is normal.      Breath sounds: Normal breath sounds.   Abdominal:      Palpations: Abdomen is soft.   Musculoskeletal:         General: Normal range of motion.      Cervical back: Normal range of motion.   Skin:     General: Skin is warm.      Comments: R Radial Soft/Flat, Non-Tender, No Sign of Bleed/Infection. +2 Palpable Radial Pulse. Distal Cap Refill < 3 Seconds     Neurological:      Mental Status: He is alert and oriented to person, place, and time.   Psychiatric:         Mood and Affect: Mood normal.         Behavior: Behavior normal.       Disposition: Home or Self Care  Follow Up:   Follow-up Information       PCP Follow up.    Why: needs PCP             Crow Perry MD Follow up.    Specialty: Cardiology  Why: 7-10 days  Contact information:  [address] [facility] [address] [phone]                           Patient Instructions:      Diet Cardiac     Lifting restrictions   Order Comments: Do not lift anything greater than 5 pounds for 1 week     No driving until:   Order Comments: No driving for 1 week     Activity as tolerated     Shower on day dressing removed (No bath)   Order Comments: Showers only for 1 week     Medications:  Reconciled Home  Medications:      Medication List        START taking these medications      aspirin 81 MG EC tablet  Commonly known as: ECOTRIN  Take 1 tablet (81 mg total) by mouth once daily.  Start taking on: December 26, 2024     atorvastatin 40 MG tablet  Commonly known as: LIPITOR  Take 1 tablet (40 mg total) by mouth once daily.  Start taking on: December 26, 2024     dapagliflozin propanediol 10 mg tablet  Commonly known as: Farxiga  Take 1 tablet (10 mg total) by mouth once daily.  Start taking on: December 26, 2024     metoprolol succinate 25 MG 24 hr tablet  Commonly known as: TOPROL-XL  Take 1 tablet (25 mg total) by mouth once daily.  Start taking on: December 26, 2024     pantoprazole 40 MG tablet  Commonly known as: PROTONIX  Take 1 tablet (40 mg total) by mouth 2 (two) times daily for 14 days, THEN 1 tablet (40 mg total) once daily.  Start taking on: December 25, 2024     sucralfate 1 gram tablet  Commonly known as: CARAFATE  Take 1 tablet (1 g total) by mouth 4 (four) times daily before meals and nightly.     ticagrelor 90 mg tablet  Commonly known as: BRILINTA  Take 1 tablet (90 mg total) by mouth 2 (two) times a day.            CONTINUE taking these medications      acetaminophen 325 MG tablet  Commonly known as: TYLENOL  Take 325 mg by mouth every 6 (six) hours as needed for Pain.     fexofenadine 180 MG tablet  Commonly known as: ALLEGRA  Take 180 mg by mouth once daily.     gabapentin 300 MG capsule  Commonly known as: NEURONTIN  Take 1 capsule (300 mg total) by mouth every evening.            Impression:  STEMI - Anterolateral Leads (Anterior Wall MI)    - C (12.22.24) - Successful IVUS guided PTCA, Mechanical Thrombectomy and Insertion of a LUCA to the Prox LAD from 100% Thrombotic Occlusion Down to 0% residual stenosis and restoration of KENIA 3 Flow. No Obstructive CAD in the RI/High Diag 1, LCX, RCA; Right Dominant System    - Troponin Peak 436.881  CAD/Stents   ICMO/EF 35-40%    - ECHO (12.22.24) - LVEF  35-40%, Akinetic Anterior Wall [place] Septum and Distal Lateral  Hypotension requiring Pressors - Resolved    - Hx of HTN   HLD  Leukocytosis   Transaminitis - Improving   Lactic Acidosis - Resolved   Hx of Brain Aneurysm   Shell Fish Allergy  No Hx of GIB     Plan:   Discharge Home   Increase metoprolol to 25 mg oral Daily  NO ACE/ARB.ARNI secondary to marginal Blood Pressures; can be assessed in the outpatient setting  Continue ASA, Statin, and Brilinta  Continue Protnix and Carafate per GI Recommendations   Wrist Precautions    - Do not drive for 1 week    - Do not lift anything greater than 5 pounds for 1 week    - Showers only for 1 week    - Monitor for signs of infection/bleeding   Follow-up with Dr. Perry in 7-10 days       IMPORTANT Antiplatelet Medication Instructions:  The patient, Canelo Blank, was instructed by Sumi Martínez on the importance of Antiplatelet Medication(s) and he verbalizes understanding.   He will continue taking his present antiplatelet Brilina as prescribed.   His new antiplatelet Brilinta was sent to the [facility]  pharmacy.  **WARNING:  Never stop **PLAVIX, EFFIENT, or BRILINTA** without first speaking to a [facility] provider** (even if another physician or surgeon insists it must be stopped for a procedure)    Time spent on the discharge of patient: 32 minutes    NE Calderon  Cardiology  [facility]  Physician addendum:  I have seen and examined this patient as a split-shared visit with the HAROON d/t complicated medical management of above problems written in assessment and high acuity requiring physician expertise in medical decision-making. I performed the substantive portion of the history and exam. Above medical decision-making is also formulated by me.    Pt. Would like to be discharged.   Plan:   Medications reviewed. Discharge to home. Answered all questions prior to discharge.   F/U scheduled as above.     Richar Ibarra MD  Cardiologist

## 2024-12-25 NOTE — CONSULTS
Gastroenterology Consultation Note    Reason for Consult:  The primary encounter diagnosis was ST elevation myocardial infarction involving left anterior descending (LAD) coronary artery. Diagnoses of CAD (coronary artery disease), STEMI (ST elevation myocardial infarction), ST elevation myocardial infarction (STEMI), unspecified artery, Acute ST elevation myocardial infarction (STEMI) of anterior wall, and Chest pain were also pertinent to this visit.    PCP:   Lori, Primary Doctor    Referring MD:  Self, Aaareferral  No address on file    Hospital Day: 3     Initial History of Present Illness (HPI):  This is a 49 y.o. male, with PMH of Subarachnoid hemorrhage (follows Neuro), who presented to the ED on 11/22/2024 with CC of chest pain. EKG revealed ST elevations in the Anterior/Lateral leads with Reciprocal change and so Cardiology was consulted. Patient was then taken to the Cath lab. Per nursing report, thrombectomy and stenting of the LAD was performed. Pt was doing well, stable labs, then had episode yesterday of brownish vomit with a drop in hgb from 15.1 to 13.8. no further episodes and spitting up clear and having brown stools. Denies any abdominal pain,. Reflux. No prior endoscopies. We are consulted for possible GI bleed.     ROS:  12 point system reviewed and is negative except as noted in HPI    Medical History:   Past Medical History:   Diagnosis Date    Headache     Subarachnoid hemorrhage 2022       Surgical History:   Past Surgical History:   Procedure Laterality Date    APPENDECTOMY      FRACTURE SURGERY Left     pinky finger       Family History:   Family History   Problem Relation Name Age of Onset    Hypertension Mother      Esophageal cancer Father     .     Social History:   Social History     Tobacco Use    Smoking status: Never     Passive exposure: Never    Smokeless tobacco: Never   Substance Use Topics    Alcohol use: Not Currently       Allergies:  Review of patient's allergies indicates:  "  Allergen Reactions    Shellfish containing products Anaphylaxis       Medications Prior to Admission   Medication Sig Dispense Refill Last Dose/Taking    fexofenadine (ALLEGRA) 180 MG tablet Take 180 mg by mouth once daily.   Past Week Morning    gabapentin (NEURONTIN) 300 MG capsule Take 1 capsule (300 mg total) by mouth every evening. 90 capsule 3 12/21/2024 Bedtime    acetaminophen (TYLENOL) 325 MG tablet Take 325 mg by mouth every 6 (six) hours as needed for Pain.   Unknown         Objective Findings:    Vital Signs:  /78 (BP Location: Left arm, Patient Position: Lying)   Pulse 107   Temp 98.5 °F (36.9 °C) (Oral)   Resp 18   Ht 5' 5" (1.651 m)   Wt 72.6 kg (160 lb)   SpO2 98%   BMI 26.63 kg/m²   Body mass index is 26.63 kg/m².    Physical Exam:  General: Well nourished w/o distress, resting comfortably in bed  Pulm: On BIPAP. CTA bilaterally, normal work of breathing  CV: S1, S2 w/o murmurs or gallops; no edema noted  GI: Bowel sound present in all quadrants, abdomen soft to palpation, non tender  MSK: Full ROM of all extremities w/o limitation or discomfort  Neuro: AAOx4; motor/sensory function intact, follows commands    Labs:  Recent Results (from the past 48 hours)   Comprehensive Metabolic Panel    Collection Time: 12/24/24  3:33 AM   Result Value Ref Range    Sodium 138 136 - 145 mmol/L    Potassium 4.2 3.5 - 5.1 mmol/L    Chloride 107 98 - 107 mmol/L    CO2 17 (L) 22 - 29 mmol/L    Glucose 98 74 - 100 mg/dL    Blood Urea Nitrogen 18.7 8.4 - 25.7 mg/dL    Creatinine 1.06 0.72 - 1.25 mg/dL    Calcium 9.2 8.4 - 10.2 mg/dL    Protein Total 6.7 6.4 - 8.3 gm/dL    Albumin 3.5 3.5 - 5.0 g/dL    Globulin 3.2 2.4 - 3.5 gm/dL    Albumin/Globulin Ratio 1.1 1.1 - 2.0 ratio    Bilirubin Total 2.7 (H) <=1.5 mg/dL    ALP 48 40 - 150 unit/L     (H) 0 - 55 unit/L     (H) 5 - 34 unit/L    eGFR >60 mL/min/1.73/m2    Anion Gap 14.0 mEq/L    BUN/Creatinine Ratio 18    Magnesium    Collection Time: " 12/24/24  3:33 AM   Result Value Ref Range    Magnesium Level 2.30 1.60 - 2.60 mg/dL   CBC with Differential    Collection Time: 12/24/24  3:33 AM   Result Value Ref Range    WBC 18.27 (H) 4.50 - 11.50 x10(3)/mcL    RBC 5.77 4.70 - 6.10 x10(6)/mcL    Hgb 15.1 14.0 - 18.0 g/dL    Hct 45.6 42.0 - 52.0 %    MCV 79.0 (L) 80.0 - 94.0 fL    MCH 26.2 (L) 27.0 - 31.0 pg    MCHC 33.1 33.0 - 36.0 g/dL    RDW 13.8 11.5 - 17.0 %    Platelet 196 130 - 400 x10(3)/mcL    MPV 10.9 (H) 7.4 - 10.4 fL    Neut % 88.5 %    Lymph % 4.2 %    Mono % 6.7 %    Eos % 0.0 %    Basophil % 0.2 %    Lymph # 0.77 0.6 - 4.6 x10(3)/mcL    Neut # 16.17 (H) 2.1 - 9.2 x10(3)/mcL    Mono # 1.22 0.1 - 1.3 x10(3)/mcL    Eos # 0.00 0 - 0.9 x10(3)/mcL    Baso # 0.03 <=0.2 x10(3)/mcL    IG# 0.08 (H) 0 - 0.04 x10(3)/mcL    IG% 0.4 %    NRBC% 0.0 %   EKG 12-lead    Collection Time: 12/24/24  4:51 AM   Result Value Ref Range    QRS Duration 86 ms    [facility] QTC Calculation 424 ms   Respiratory Culture    Collection Time: 12/24/24  5:04 PM    Specimen: Sputum   Result Value Ref Range    Respiratory Culture Normal respiratory cece     GRAM STAIN Quality 1+     GRAM STAIN Many Gram positive cocci     GRAM STAIN Many Gram Positive Rods     GRAM STAIN Many Gram Negative Rods    CBC with Differential    Collection Time: 12/25/24  3:00 AM   Result Value Ref Range    WBC 13.55 (H) 4.50 - 11.50 x10(3)/mcL    RBC 5.30 4.70 - 6.10 x10(6)/mcL    Hgb 13.8 (L) 14.0 - 18.0 g/dL    Hct 41.8 (L) 42.0 - 52.0 %    MCV 78.9 (L) 80.0 - 94.0 fL    MCH 26.0 (L) 27.0 - 31.0 pg    MCHC 33.0 33.0 - 36.0 g/dL    RDW 13.6 11.5 - 17.0 %    Platelet 202 130 - 400 x10(3)/mcL    MPV 10.9 (H) 7.4 - 10.4 fL    Neut % 86.8 %    Lymph % 5.2 %    Mono % 7.5 %    Eos % 0.1 %    Basophil % 0.1 %    Lymph # 0.71 0.6 - 4.6 x10(3)/mcL    Neut # 11.77 (H) 2.1 - 9.2 x10(3)/mcL    Mono # 1.01 0.1 - 1.3 x10(3)/mcL    Eos # 0.01 0 - 0.9 x10(3)/mcL    Baso # 0.01 <=0.2 x10(3)/mcL    IG# 0.04 0 - 0.04 x10(3)/mcL     IG% 0.3 %    NRBC% 0.0 %   Comprehensive Metabolic Panel    Collection Time: 12/25/24  3:01 AM   Result Value Ref Range    Sodium 136 136 - 145 mmol/L    Potassium 3.7 3.5 - 5.1 mmol/L    Chloride 104 98 - 107 mmol/L    CO2 19 (L) 22 - 29 mmol/L    Glucose 103 (H) 74 - 100 mg/dL    Blood Urea Nitrogen 23.3 8.4 - 25.7 mg/dL    Creatinine 0.89 0.72 - 1.25 mg/dL    Calcium 8.6 8.4 - 10.2 mg/dL    Protein Total 6.5 6.4 - 8.3 gm/dL    Albumin 3.1 (L) 3.5 - 5.0 g/dL    Globulin 3.4 2.4 - 3.5 gm/dL    Albumin/Globulin Ratio 0.9 (L) 1.1 - 2.0 ratio    Bilirubin Total 2.8 (H) <=1.5 mg/dL    ALP 42 40 - 150 unit/L     (H) 0 - 55 unit/L     (H) 5 - 34 unit/L    eGFR >60 mL/min/1.73/m2    Anion Gap 13.0 mEq/L    BUN/Creatinine Ratio 26    Magnesium    Collection Time: 12/25/24  3:01 AM   Result Value Ref Range    Magnesium Level 2.40 1.60 - 2.60 mg/dL       X-Ray Chest 1 View   Final Result      Interval development of bilateral infiltrates as above.      Agree with adalberto         Electronically signed by: Matias Dominguez MD   Date:    12/24/2024   Time:    11:25      X-Ray Chest AP Portable   Final Result      No acute findings.         Electronically signed by: Kalia Hollins   Date:    12/22/2024   Time:    16:47          Imaging: reviewed    Endoscopy:    none    Assessment/Plan:  Questionable hematemasis?  Hgb stable - no further episodes  POD 3 NSTEMI with thrombectomy and PCI w stents    Place on BID PPI x 2 weeks  Call Gi office for out patient f/u as needed  Call for op screening colonscopy in 6 months when can be off OAC    Thank you for allowing us to participate in the care of Canelo Blank.    Maite Quispe NP as scribe for Dr. Bora Jim

## 2024-12-26 LAB
BACTERIA SPEC CULT: NORMAL
GRAM STN SPEC: NORMAL
OHS QRS DURATION: 90 MS
OHS QRS DURATION: 92 MS
OHS QRS DURATION: 92 MS
OHS QTC CALCULATION: 426 MS
OHS QTC CALCULATION: 428 MS
OHS QTC CALCULATION: 431 MS

## 2025-01-27 ENCOUNTER — HOSPITAL ENCOUNTER (OUTPATIENT)
Facility: HOSPITAL | Age: 50
Discharge: HOME | End: 2025-01-28
Attending: EMERGENCY MEDICINE | Admitting: INTERNAL MEDICINE
Payer: COMMERCIAL

## 2025-01-27 DIAGNOSIS — I50.9 ACUTE EXACERBATION OF CHF (CONGESTIVE HEART FAILURE): ICD-10-CM

## 2025-01-27 DIAGNOSIS — R07.9 CHEST PAIN: ICD-10-CM

## 2025-01-27 DIAGNOSIS — R79.89 ELEVATED TROPONIN: Primary | ICD-10-CM

## 2025-01-27 LAB
ALBUMIN SERPL-MCNC: 3.7 G/DL (ref 3.5–5)
ALBUMIN/GLOB SERPL: 1.1 RATIO (ref 1.1–2)
ALP SERPL-CCNC: 52 UNIT/L (ref 40–150)
ALT SERPL-CCNC: 17 UNIT/L (ref 0–55)
AMPHET UR QL SCN: NEGATIVE
ANION GAP SERPL CALC-SCNC: 11 MEQ/L
AST SERPL-CCNC: 21 UNIT/L (ref 5–34)
BARBITURATE SCN PRESENT UR: NEGATIVE
BASOPHILS # BLD AUTO: 0.06 X10(3)/MCL
BASOPHILS NFR BLD AUTO: 0.8 %
BENZODIAZ UR QL SCN: NEGATIVE
BILIRUB SERPL-MCNC: 1.6 MG/DL
BNP BLD-MCNC: 476.2 PG/ML
BUN SERPL-MCNC: 11.2 MG/DL (ref 8.4–25.7)
CALCIUM SERPL-MCNC: 9 MG/DL (ref 8.4–10.2)
CANNABINOIDS UR QL SCN: NEGATIVE
CHLORIDE SERPL-SCNC: 107 MMOL/L (ref 98–107)
CO2 SERPL-SCNC: 23 MMOL/L (ref 22–29)
COCAINE UR QL SCN: NEGATIVE
CREAT SERPL-MCNC: 1 MG/DL (ref 0.72–1.25)
CREAT/UREA NIT SERPL: 11
EOSINOPHIL # BLD AUTO: 0.44 X10(3)/MCL (ref 0–0.9)
EOSINOPHIL NFR BLD AUTO: 6.1 %
ERYTHROCYTE [DISTWIDTH] IN BLOOD BY AUTOMATED COUNT: 14.2 % (ref 11.5–17)
FENTANYL UR QL SCN: NEGATIVE
FLUAV AG UPPER RESP QL IA.RAPID: NOT DETECTED
FLUBV AG UPPER RESP QL IA.RAPID: NOT DETECTED
GFR SERPLBLD CREATININE-BSD FMLA CKD-EPI: >60 ML/MIN/1.73/M2
GLOBULIN SER-MCNC: 3.3 GM/DL (ref 2.4–3.5)
GLUCOSE SERPL-MCNC: 110 MG/DL (ref 74–100)
HCT VFR BLD AUTO: 41.5 % (ref 42–52)
HGB BLD-MCNC: 13.5 G/DL (ref 14–18)
IMM GRANULOCYTES # BLD AUTO: 0.01 X10(3)/MCL (ref 0–0.04)
IMM GRANULOCYTES NFR BLD AUTO: 0.1 %
INR PPP: 1.1
LYMPHOCYTES # BLD AUTO: 1.3 X10(3)/MCL (ref 0.6–4.6)
LYMPHOCYTES NFR BLD AUTO: 17.9 %
MCH RBC QN AUTO: 25.6 PG (ref 27–31)
MCHC RBC AUTO-ENTMCNC: 32.5 G/DL (ref 33–36)
MCV RBC AUTO: 78.7 FL (ref 80–94)
MDMA UR QL SCN: NEGATIVE
MONOCYTES # BLD AUTO: 0.62 X10(3)/MCL (ref 0.1–1.3)
MONOCYTES NFR BLD AUTO: 8.5 %
NEUTROPHILS # BLD AUTO: 4.83 X10(3)/MCL (ref 2.1–9.2)
NEUTROPHILS NFR BLD AUTO: 66.6 %
NRBC BLD AUTO-RTO: 0 %
OHS QRS DURATION: 92 MS
OHS QTC CALCULATION: 495 MS
OPIATES UR QL SCN: NEGATIVE
PCP UR QL: NEGATIVE
PH UR: 7.5 [PH] (ref 3–11)
PLATELET # BLD AUTO: 219 X10(3)/MCL (ref 130–400)
PMV BLD AUTO: 11.1 FL (ref 7.4–10.4)
POTASSIUM SERPL-SCNC: 3.5 MMOL/L (ref 3.5–5.1)
PROT SERPL-MCNC: 7 GM/DL (ref 6.4–8.3)
PROTHROMBIN TIME: 14.2 SECONDS (ref 12.5–14.5)
RBC # BLD AUTO: 5.27 X10(6)/MCL (ref 4.7–6.1)
SARS-COV-2 RNA RESP QL NAA+PROBE: NOT DETECTED
SODIUM SERPL-SCNC: 141 MMOL/L (ref 136–145)
SPECIFIC GRAVITY, URINE AUTO (.000) (OHS): 1.01 (ref 1–1.03)
TROPONIN I SERPL-MCNC: 0.13 NG/ML (ref 0–0.04)
TROPONIN I SERPL-MCNC: 0.16 NG/ML (ref 0–0.04)
TROPONIN I SERPL-MCNC: 0.17 NG/ML (ref 0–0.04)
WBC # BLD AUTO: 7.26 X10(3)/MCL (ref 4.5–11.5)

## 2025-01-27 PROCEDURE — 85610 PROTHROMBIN TIME: CPT | Performed by: EMERGENCY MEDICINE

## 2025-01-27 PROCEDURE — 84484 ASSAY OF TROPONIN QUANT: CPT | Mod: 91 | Performed by: EMERGENCY MEDICINE

## 2025-01-27 PROCEDURE — 93010 ELECTROCARDIOGRAM REPORT: CPT | Mod: ,,, | Performed by: INTERNAL MEDICINE

## 2025-01-27 PROCEDURE — 80307 DRUG TEST PRSMV CHEM ANLYZR: CPT | Performed by: NURSE PRACTITIONER

## 2025-01-27 PROCEDURE — G0378 HOSPITAL OBSERVATION PER HR: HCPCS

## 2025-01-27 PROCEDURE — 96374 THER/PROPH/DIAG INJ IV PUSH: CPT

## 2025-01-27 PROCEDURE — 25000003 PHARM REV CODE 250: Performed by: NURSE PRACTITIONER

## 2025-01-27 PROCEDURE — 25000003 PHARM REV CODE 250: Performed by: EMERGENCY MEDICINE

## 2025-01-27 PROCEDURE — 83880 ASSAY OF NATRIURETIC PEPTIDE: CPT | Performed by: EMERGENCY MEDICINE

## 2025-01-27 PROCEDURE — 0240U COVID/FLU A&B PCR: CPT | Performed by: EMERGENCY MEDICINE

## 2025-01-27 PROCEDURE — 63600175 PHARM REV CODE 636 W HCPCS: Performed by: EMERGENCY MEDICINE

## 2025-01-27 PROCEDURE — 80053 COMPREHEN METABOLIC PANEL: CPT | Performed by: EMERGENCY MEDICINE

## 2025-01-27 PROCEDURE — 99285 EMERGENCY DEPT VISIT HI MDM: CPT | Mod: 25

## 2025-01-27 PROCEDURE — 96375 TX/PRO/DX INJ NEW DRUG ADDON: CPT

## 2025-01-27 PROCEDURE — 85025 COMPLETE CBC W/AUTO DIFF WBC: CPT | Performed by: EMERGENCY MEDICINE

## 2025-01-27 PROCEDURE — 93005 ELECTROCARDIOGRAM TRACING: CPT

## 2025-01-27 RX ORDER — SODIUM CHLORIDE 9 MG/ML
500 INJECTION, SOLUTION INTRAVENOUS
Status: COMPLETED | OUTPATIENT
Start: 2025-01-27 | End: 2025-01-27

## 2025-01-27 RX ORDER — NAPROXEN SODIUM 220 MG/1
81 TABLET, FILM COATED ORAL DAILY
Status: DISCONTINUED | OUTPATIENT
Start: 2025-01-28 | End: 2025-01-28 | Stop reason: HOSPADM

## 2025-01-27 RX ORDER — FUROSEMIDE 10 MG/ML
40 INJECTION INTRAMUSCULAR; INTRAVENOUS
Status: COMPLETED | OUTPATIENT
Start: 2025-01-27 | End: 2025-01-27

## 2025-01-27 RX ORDER — ONDANSETRON HYDROCHLORIDE 2 MG/ML
4 INJECTION, SOLUTION INTRAVENOUS EVERY 8 HOURS PRN
Status: DISCONTINUED | OUTPATIENT
Start: 2025-01-27 | End: 2025-01-28 | Stop reason: HOSPADM

## 2025-01-27 RX ORDER — ACETAMINOPHEN 325 MG/1
650 TABLET ORAL EVERY 8 HOURS PRN
Status: DISCONTINUED | OUTPATIENT
Start: 2025-01-27 | End: 2025-01-28 | Stop reason: HOSPADM

## 2025-01-27 RX ORDER — SODIUM CHLORIDE 0.9 % (FLUSH) 0.9 %
10 SYRINGE (ML) INJECTION
Status: DISCONTINUED | OUTPATIENT
Start: 2025-01-27 | End: 2025-01-28 | Stop reason: HOSPADM

## 2025-01-27 RX ORDER — DAPAGLIFLOZIN 10 MG/1
10 TABLET, FILM COATED ORAL DAILY
Status: DISCONTINUED | OUTPATIENT
Start: 2025-01-28 | End: 2025-01-28 | Stop reason: HOSPADM

## 2025-01-27 RX ORDER — ASPIRIN 325 MG
325 TABLET ORAL
Status: COMPLETED | OUTPATIENT
Start: 2025-01-27 | End: 2025-01-27

## 2025-01-27 RX ORDER — METOPROLOL SUCCINATE 25 MG/1
25 TABLET, EXTENDED RELEASE ORAL DAILY
Status: DISCONTINUED | OUTPATIENT
Start: 2025-01-28 | End: 2025-01-28 | Stop reason: HOSPADM

## 2025-01-27 RX ORDER — ATORVASTATIN CALCIUM 40 MG/1
40 TABLET, FILM COATED ORAL DAILY
Status: DISCONTINUED | OUTPATIENT
Start: 2025-01-28 | End: 2025-01-28 | Stop reason: HOSPADM

## 2025-01-27 RX ADMIN — SODIUM CHLORIDE 500 ML: 9 INJECTION, SOLUTION INTRAVENOUS at 09:01

## 2025-01-27 RX ADMIN — ASPIRIN 325 MG ORAL TABLET 325 MG: 325 PILL ORAL at 05:01

## 2025-01-27 RX ADMIN — TICAGRELOR 90 MG: 90 TABLET ORAL at 11:01

## 2025-01-27 RX ADMIN — TICAGRELOR 90 MG: 90 TABLET ORAL at 09:01

## 2025-01-27 RX ADMIN — FUROSEMIDE 40 MG: 10 INJECTION, SOLUTION INTRAMUSCULAR; INTRAVENOUS at 05:01

## 2025-01-27 RX ADMIN — ONDANSETRON 4 MG: 2 INJECTION INTRAMUSCULAR; INTRAVENOUS at 10:01

## 2025-01-27 NOTE — H&P
[facility] *    Cardiology  History and Physical     Patient Name: Canelo Blank  MRN: [id number]  Admission Date: 1/27/2025  Code Status: Full Code   Attending Provider: Marilyn Gupta MD   Primary Care Physician: No, Primary Doctor  Principal Problem:<principal problem not specified>    Patient information was obtained from patient, past medical records, and ER records.     Subjective:     Chief Complaint:  Chest Pain & cough    HPI: 49-year-old male that is known to [facility]/Dr. Perry with a PMHx of STEMI, ICMO (35-40%), CAD, HLD, Subarachnoid hemorrhage. He presented to the ER with complaints of chest pain with complaints of a productive cough that began about a week ago. He also reported some orthopnea and PND. On Exam He denied SOB and report his BLE dramatically improved. On exam he was walking arounf the room when he got light headed and passed out. He hit his head and back on the wall he was found to be hypotensive. Head and spine CT showed no acute finding. [facility] was consulted for Chest pain and CHF.      PMH: Subarachnoid Hemorrhage, Headaches, CAD/Stents, ICMO/EF 35-40%  PSH: Appendectomy, L Finger Fracture Repair   Family History: Mother, L, HTN; Father, L, Esophageal Cancer  Social History: Denies Illicit Drug, ETOH and Tobacco Use      Previous Cardiac Diagnostics:   ECHO (12.22.24):  Left Ventricle: The left ventricle is normal in size. Moderately increased wall thickness. Regional wall motion abnormalities present.  akinetic ant wall apex setum and distal lateral c/w LAD occulsion There is moderately reduced systolic function with a visually estimated ejection fraction of 35 - 40%. Grade I diastolic dysfunction.  Right Ventricle: Normal right ventricular cavity size. Systolic function is borderline low.  Aortic Valve: The aortic valve is a trileaflet valve.  Mitral Valve: Mildly calcified leaflets. There is trace regurgitation.  IVC/SVC: Normal venous pressure at 3 mmHg.  Pericardium: There  is no pericardial effusion. Left pleural effusion.     [facility] (12.22.24):  Coronary findings:  Dominance: right   LM:  No obstructive disease with less than 10% epicardial stenosis  LAD:  100% thrombotic occlusion in the proximal LAD with KENIA 0 flow.  Ramus intermedius vessel/high diagonal 1 branch:  No obstructive coronary artery disease with less than 10% epicardial stenosis  LCx:  No obstructive disease with less than 10% epicardial stenosis  RCA:  No obstructive disease with less than 10% epicardial stenosis  Impression:   Successful IVUS guided PTCA, mechanical thrombectomy and insertion of a drug-eluting stent to the proximal LAD from 100% thrombotic occlusion down to 0% residual stenosis and restoration of KENIA 3 flow  No obstructive coronary artery disease in the ramus intermedius/high diagonal 1, left circumflex coronary artery, RCA  Right dominant system  Elevated LVEDP of 30 mmHg   Plan:  Aspirin and Brilinta uninterrupted  IV Lasix  Echocardiogram  Monitor right radial access site      Review of patient's allergies indicates:   Allergen Reactions    Shellfish containing products Anaphylaxis       No current facility-administered medications on file prior to encounter.     Current Outpatient Medications on File Prior to Encounter   Medication Sig    acetaminophen (TYLENOL) 325 MG tablet Take 325 mg by mouth every 6 (six) hours as needed for Pain.    aspirin (ECOTRIN) 81 MG EC tablet Take 1 tablet (81 mg total) by mouth once daily.    atorvastatin (LIPITOR) 40 MG tablet Take 1 tablet (40 mg total) by mouth once daily.    dapagliflozin propanediol (FARXIGA) 10 mg tablet Take 1 tablet (10 mg total) by mouth once daily.    fexofenadine (ALLEGRA) 180 MG tablet Take 180 mg by mouth once daily.    gabapentin (NEURONTIN) 300 MG capsule Take 1 capsule (300 mg total) by mouth every evening.    metoprolol succinate (TOPROL-XL) 25 MG 24 hr tablet Take 1 tablet (25 mg total) by mouth once daily.    pantoprazole  (PROTONIX) 40 MG tablet Take 1 tablet (40 mg total) by mouth 2 (two) times daily for 14 days, THEN 1 tablet (40 mg total) once daily.    sucralfate (CARAFATE) 1 gram tablet Take 1 tablet (1 g total) by mouth 4 (four) times daily before meals and nightly.    ticagrelor (BRILINTA) 90 mg tablet Take 1 tablet (90 mg total) by mouth 2 (two) times a day.     Review of Systems   Constitutional: Negative for chills and fever.   Cardiovascular:  Negative for chest pain, near-syncope and syncope.   Respiratory:  Negative for shortness of breath and wheezing.    Musculoskeletal: Negative.    Gastrointestinal:  Negative for abdominal pain, nausea and vomiting.   Neurological: Negative.    Psychiatric/Behavioral: Negative.     All other systems reviewed and are negative.      Objective:     Vital Signs (Most Recent):  Temp: 98.2 °F (36.8 °C) (01/27/25 0848)  Pulse: 87 (01/27/25 0921)  Resp: (!) 24 (01/27/25 0921)  BP: 100/73 (01/27/25 0921)  SpO2: 98 % (01/27/25 0921) Vital Signs (24h Range):  Temp:  [98.2 °F (36.8 °C)-98.5 °F (36.9 °C)] 98.2 °F (36.8 °C)  Pulse:  [66-95] 87  Resp:  [17-30] 24  SpO2:  [96 %-99 %] 98 %  BP: ()/(32-92) 100/73     Weight: 68.9 kg (152 lb)  Body mass index is 25.29 kg/m².    SpO2: 98 %       No intake or output data in the 24 hours ending 01/27/25 0950    Lines/Drains/Airways       Peripheral Intravenous Line  Duration                  Peripheral IV - Single Lumen 01/27/25 0213 18 G 1 1/4 in Right Antecubital <1 day         Peripheral IV - Single Lumen 01/27/25 0918 20 G Distal;Left;Posterior Forearm <1 day                    Physical Exam  Constitutional:       General: He is not in acute distress.     Appearance: Normal appearance.   HENT:      Mouth/Throat:      Mouth: Mucous membranes are moist.   Cardiovascular:      Rate and Rhythm: Normal rate and regular rhythm.      Pulses: Normal pulses.      Heart sounds: No murmur heard.  Pulmonary:      Effort: No respiratory distress.      Breath  sounds: Normal breath sounds.   Abdominal:      General: Abdomen is flat.   Skin:     General: Skin is warm.   Neurological:      Mental Status: He is alert and oriented to person, place, and time.   Psychiatric:         Mood and Affect: Mood normal.       EKG:       Telemetry: NSR    Home Medications:   No current facility-administered medications on file prior to encounter.     Current Outpatient Medications on File Prior to Encounter   Medication Sig Dispense Refill    acetaminophen (TYLENOL) 325 MG tablet Take 325 mg by mouth every 6 (six) hours as needed for Pain.      aspirin (ECOTRIN) 81 MG EC tablet Take 1 tablet (81 mg total) by mouth once daily. 30 tablet 11    atorvastatin (LIPITOR) 40 MG tablet Take 1 tablet (40 mg total) by mouth once daily. 90 tablet 3    dapagliflozin propanediol (FARXIGA) 10 mg tablet Take 1 tablet (10 mg total) by mouth once daily. 30 tablet 11    fexofenadine (ALLEGRA) 180 MG tablet Take 180 mg by mouth once daily.      gabapentin (NEURONTIN) 300 MG capsule Take 1 capsule (300 mg total) by mouth every evening. 90 capsule 3    metoprolol succinate (TOPROL-XL) 25 MG 24 hr tablet Take 1 tablet (25 mg total) by mouth once daily. 90 tablet 3    pantoprazole (PROTONIX) 40 MG tablet Take 1 tablet (40 mg total) by mouth 2 (two) times daily for 14 days, THEN 1 tablet (40 mg total) once daily. 88 tablet 0    sucralfate (CARAFATE) 1 gram tablet Take 1 tablet (1 g total) by mouth 4 (four) times daily before meals and nightly. 120 tablet 0    ticagrelor (BRILINTA) 90 mg tablet Take 1 tablet (90 mg total) by mouth 2 (two) times a day. 60 tablet 11     Current Schedule Inpatient Medications:    Continuous Infusions:    Significant Labs:   Chemistries:   Recent Labs   Lab 01/27/25  0220 01/27/25  0539     --    K 3.5  --      --    CO2 23  --    BUN 11.2  --    CREATININE 1.00  --    CALCIUM 9.0  --    BILITOT 1.6*  --    ALKPHOS 52  --    ALT 17  --    AST 21  --    GLUCOSE 110*  --     TROPONINI 0.159* 0.166*        CBC/Anemia Labs: Coags:    Recent Labs   Lab 01/27/25 0220   WBC 7.26   HGB 13.5*   HCT 41.5*      MCV 78.7*   RDW 14.2    Recent Labs   Lab 01/27/25 0220   INR 1.1          Assessment and Plan:   ICMO ~ LVEF 35-40%   - ECHO (12.22.24) - LVEF 35-40%, Akinetic Anterior Wall [place] Septum and Distal Lateral  Syncope   Chest Pain   CAD / Hx of STEMI    - LHC (12.22.24): Successful IVUS guided PTCA, Mechanical Thrombectomy and Insertion of a LUCA to the Prox LAD from 100% Thrombotic Occlusion Down to 0% residual stenosis and restoration of KENIA 3 Flow. No Obstructive CAD in the RI/High Diag 1, LCX, RCA; Right Dominant System  HTN  HLD   Hx of Brain Aneurysm   Shell Fish Allergy  No Hx of GIB       Plan:   EKG reviewed ~ no acute ST changes   Obtain CT of Chest and Spine   Resume ASA, Brilinta, & Statin   Resume GDMT for CHF: Farxiga 10mg, Metoprolol succ 25mg, & Farxiga 10mg   Willl hold off on diuresis given syncopal episode   AM labs: CBC, CMP, Mag       VTE Risk Mitigation (From admission, onward)           Ordered     IP VTE HIGH RISK PATIENT  Once         01/27/25 0520     Place sequential compression device  Until discontinued         01/27/25 0520                    Audra Martinez NP  Cardiology  [facility]    Physician addendum:  I have seen and examined this patient as a split-shared visit with the HAROON d/t complicated medical management of above problems written in assessment and high acuity requiring physician expertise in medical decision-making. I performed the substantive portion of the history and exam. Above medical decision-making is also formulated by me.    Plan:  CT CHEST AND SPINE - syncope and fall in the ER   Hold lasix   F/u in am

## 2025-01-27 NOTE — Clinical Note
Diagnosis: Acute exacerbation of CHF (congestive heart failure) [036088]   Future Attending Provider: KAVIN ESPARZA [[id number]]   Admit to which facility:: [facility] [[id number]]

## 2025-01-27 NOTE — ED PROVIDER NOTES
Encounter Date: 1/27/2025       History     Chief Complaint   Patient presents with    Chest Pain     Coughing with productive sputum since Monday, Chest tightness and shortness of breath that started a few days ago. Hx of MI 1 month ago, stent placed.     49-year-old male recently admitted 1 month ago for STEMI, found to have 100% occlusion of the LAD, s/p thrombectomy with LUCA, here for WEATHERS that has progressed over the past week.  He also describes a 3 pillow orthopnea as well as PND.  After he was discharged from the hospital, he had a similar episode of this early in his post-procedural state, given Lasix for 3 days with improvement.  He is not currently on diuretic therapy.  He denies any chest discomfort but does report occasional cough productive of clear sputum.  No fever or sick contacts.  He denies any bleeding.  He is compliant with his medications.  No other acute issues    The history is provided by the patient.     Review of patient's allergies indicates:   Allergen Reactions    Shellfish containing products Anaphylaxis     Past Medical History:   Diagnosis Date    Headache     Subarachnoid hemorrhage 2022     Past Surgical History:   Procedure Laterality Date    ANGIOGRAM, CORONARY, WITH LEFT HEART CATHETERIZATION  12/22/2024    Procedure: Angiogram, Coronary, with Left Heart Cath;  Surgeon: Osvaldo Dyer MD;  Location: [facility] CATH LAB;  Service: Cardiology;;    APPENDECTOMY      CORONARY STENT PLACEMENT N/A 12/22/2024    Procedure: INSERTION, STENT, CORONARY ARTERY;  Surgeon: Osvaldo Dyer MD;  Location: [facility] CATH LAB;  Service: Cardiology;  Laterality: N/A;    FRACTURE SURGERY Left     pinky finger    IVUS, CORONARY N/A 12/22/2024    Procedure: IVUS, Coronary;  Surgeon: Osvaldo Dyer MD;  Location: [facility] CATH LAB;  Service: Cardiology;  Laterality: N/A;    THROMBECTOMY, CORONARY  12/22/2024    Procedure: Thrombectomy, Coronary;  Surgeon: Osvaldo Dyer MD;  Location: [facility] CATH LAB;  Service:  Cardiology;;     Family History   Problem Relation Name Age of Onset    Hypertension Mother      Esophageal cancer Father       Social History     Tobacco Use    Smoking status: Never     Passive exposure: Never    Smokeless tobacco: Never   Substance Use Topics    Alcohol use: Not Currently    Drug use: Never     Review of Systems   Constitutional:  Negative for fever and unexpected weight change.   HENT:  Negative for congestion, rhinorrhea, sinus pressure, sinus pain and sore throat.    Eyes:  Negative for visual disturbance.   Respiratory:  Positive for cough and shortness of breath.    Cardiovascular:  Negative for chest pain and leg swelling.   Gastrointestinal:  Negative for abdominal pain, blood in stool, diarrhea, nausea and vomiting.   Genitourinary:  Negative for difficulty urinating and hematuria.   Skin:  Negative for pallor.   Neurological:  Negative for dizziness, syncope, light-headedness and headaches.       Physical Exam     Initial Vitals [01/27/25 0136]   BP Pulse Resp Temp SpO2   (!) 132/92 95 18 98.5 °F (36.9 °C) 99 %      MAP       --         Physical Exam    Nursing note and vitals reviewed.  Constitutional: He appears well-developed and well-nourished. He is not diaphoretic. No distress.   HENT:   Head: Normocephalic and atraumatic. Mouth/Throat: Oropharynx is clear and moist.   Eyes: Conjunctivae and EOM are normal. Pupils are equal, round, and reactive to light.   Neck: Neck supple.   Normal range of motion.  Cardiovascular:  Normal rate, regular rhythm, normal heart sounds and intact distal pulses.           No murmur heard.  Pulmonary/Chest: No respiratory distress. He has rales.   Diminished breath sounds diffusely, right greater than left with faint crackles at the bases   Abdominal: Abdomen is soft. Bowel sounds are normal. He exhibits no distension. There is no abdominal tenderness.   Musculoskeletal:         General: No tenderness or edema. Normal range of motion.      Cervical  back: Normal range of motion and neck supple.     Neurological: He is alert and oriented to person, place, and time. He has normal strength.   Skin: Skin is warm and dry. Capillary refill takes less than 2 seconds. No pallor.   Psychiatric: He has a normal mood and affect.         ED Course   Procedures  Labs Reviewed   COMPREHENSIVE METABOLIC PANEL - Abnormal       Result Value    Sodium 141      Potassium 3.5      Chloride 107      CO2 23      Glucose 110 (*)     Blood Urea Nitrogen 11.2      Creatinine 1.00      Calcium 9.0      Protein Total 7.0      Albumin 3.7      Globulin 3.3      Albumin/Globulin Ratio 1.1      Bilirubin Total 1.6 (*)     ALP 52      ALT 17      AST 21      eGFR >60      Anion Gap 11.0      BUN/Creatinine Ratio 11     TROPONIN I - Abnormal    Troponin-I 0.159 (*)    CBC WITH DIFFERENTIAL - Abnormal    WBC 7.26      RBC 5.27      Hgb 13.5 (*)     Hct 41.5 (*)     MCV 78.7 (*)     MCH 25.6 (*)     MCHC 32.5 (*)     RDW 14.2      Platelet 219      MPV 11.1 (*)     Neut % 66.6      Lymph % 17.9      Mono % 8.5      Eos % 6.1      Basophil % 0.8      Imm Grans % 0.1      Neut # 4.83      Lymph # 1.30      Mono # 0.62      Eos # 0.44      Baso # 0.06      Imm Gran # 0.01      NRBC% 0.0     B-TYPE NATRIURETIC PEPTIDE - Abnormal    Natriuretic Peptide 476.2 (*)    TROPONIN I - Abnormal    Troponin-I 0.166 (*)    PROTIME-INR - Normal    PT 14.2      INR 1.1     COVID/FLU A&B PCR - Normal    Influenza A PCR Not Detected      Influenza B PCR Not Detected      SARS-CoV-2 PCR Not Detected      Narrative:     The Xpert Xpress SARS-CoV-2/FLU/RSV plus is a rapid, multiplexed real-time PCR test intended for the simultaneous qualitative detection and differentiation of SARS-CoV-2, Influenza A, Influenza B, and respiratory syncytial virus (RSV) viral RNA in either nasopharyngeal swab or nasal swab specimens.         DRUG SCREEN, URINE (BEAKER) - Normal    Amphetamines, Urine Negative      Barbiturates, Urine  Negative      Benzodiazepine, Urine Negative      Cannabinoids, Urine Negative      Cocaine, Urine Negative      Fentanyl, Urine Negative      MDMA, Urine Negative      Opiates, Urine Negative      Phencyclidine, Urine Negative      pH, Urine 7.5      Specific Gravity, Urine Auto 1.010      Narrative:     Cut off concentrations:    Amphetamines - 1000 ng/ml  Barbiturates - 200 ng/ml  Benzodiazepine - 200 ng/ml  Cannabinoids (THC) - 50 ng/ml  Cocaine - 300 ng/ml  Fentanyl - 1.0 ng/ml  MDMA - 500 ng/ml  Opiates - 300 ng/ml   Phencyclidine (PCP) - 25 ng/ml    Specimen submitted for drug analysis and tested for pH and specific gravity in order to evaluate sample integrity. Suspect tampering if specific gravity is <1.003 and/or pH is not within the range of 4.5 - 8.0  False negatives may result form substances such as bleach added to urine.  False positives may result for the presence of a substance with similar chemical structure to the drug or its metabolite.    This test provides only a PRELIMINARY analytical test result. A more specific alternate chemical method must be used in order to obtain a confirmed analytical result. Gas chromatography/mass spectrometry (GC/MS) is the preferred confirmatory method. Other chemical confirmation methods are available. Clinical consideration and professional judgement should be applied to any drug of abuse test result, particularly when preliminary positive results are used.    Positive results will be confirmed only at the physicians request. Unconfirmed screening results are to be used only for medical purposes (treatment).        CBC W/ AUTO DIFFERENTIAL    Narrative:     The following orders were created for panel order CBC auto differential.  Procedure                               Abnormality         Status                     ---------                               -----------         ------                     CBC with Differential[[id number]]       Abnormal             Final result                 Please view results for these tests on the individual orders.   TROPONIN I     EKG Readings: (Independently Interpreted)   Initial Reading: No STEMI. Previous EKG: Compared with most recent EKG Rhythm: Normal Sinus Rhythm. Heart Rate: 95. Ectopy: No Ectopy.   0136  Inferolateral T-wave inversions  Resolution of ST elevation     ECG Results              EKG 12-lead (Final result)        Collection Time Result Time QRS Duration [facility] QTC Calculation    01/27/25 01:36:53 01/27/25 09:03:00 92 495                     Final result by Interface, Lab In [facility] (01/27/25 09:03:03)                   Narrative:    Test Reason : R07.9,    Vent. Rate :  95 BPM     Atrial Rate :  95 BPM     P-R Int : 202 ms          QRS Dur :  92 ms      QT Int : 394 ms       P-R-T Axes :  72 126 196 degrees    QTcB Int : 495 ms    Normal sinus rhythm  Anterolateral infarct T wave abnormality, consider inferior ischemia  Abnormal ECG  When compared with ECG of 25-Dec-2024 05:49,  Significant changes have occurred  Confirmed by Virgil Johnson ([id number]) on 1/27/2025 9:02:55 AM    Referred By:            Confirmed By: Virgil Johnson                                      X-Rays:   Independently Interpreted Readings:   Chest X-Ray: Bilateral pleural effusions with increased interstitial markings concerning for pulmonary edema     Medications   sodium chloride 0.9% flush 10 mL (has no administration in time range)   acetaminophen tablet 650 mg (has no administration in time range)   ondansetron injection 4 mg (4 mg Intravenous Given 1/27/25 1040)   acetaminophen tablet 650 mg (has no administration in time range)   ticagrelor tablet 90 mg (has no administration in time range)   furosemide injection 40 mg (40 mg Intravenous Given 1/27/25 9615)   aspirin tablet 325 mg (325 mg Oral Given 1/27/25 9630)   0.9% NaCl infusion (0 mLs Intravenous Stopped 1/27/25 7884)     Medical Decision Making  49-year-old male with recent admission for ST  "elevation MI, status post thrombectomy with LUCA of the LAD last month, here for evaluation of dyspnea on exertion with orthopnea and PND.  He does have diminished breath sounds with crackles on exam, afebrile, normotensive, oxygenating well on room air.  No lower extremity edema appreciated.  Cardiac workup initiated in triage, remarkable for mild elevation in troponin which is decreased from his previous admission.  This was 1 month ago, however troponin was over 400.  Today's value may be decreasing from this episode versus NSTEMI secondary to CHF.  Chest x-ray with pulmonary edema and pleural effusions concerning for heart failure exacerbation.  I have added a BNP and will give him a dose of Lasix.  He is currently asymptomatic at rest.  Cardiology will be consulted for admission.    Differential diagnosis includes but isn't limited to ACS, CHF, anemia, electrolyte abnormality, less likely pneumonia and others    Problems Addressed:  Acute exacerbation of CHF (congestive heart failure): acute illness or injury    Amount and/or Complexity of Data Reviewed  External Data Reviewed: labs, radiology, ECG and notes.  Labs: ordered. Decision-making details documented in ED Course.  Radiology: ordered and independent interpretation performed. Decision-making details documented in ED Course.  ECG/medicine tests: ordered and independent interpretation performed. Decision-making details documented in ED Course.    Risk  OTC drugs.  Prescription drug management.  Decision regarding hospitalization.               ED Course as of 01/27/25 1123   Mon Jan 27, 2025   0510 [facility] contacted, spoke with ARIADNE Aragon.  She agrees to admission for diuresis.  Hold Lovenox until repeat troponin [RB]      ED Course User Index  [RB] Alyssa Scott MD             /82   Pulse 87   Temp 98.2 °F (36.8 °C) (Oral)   Resp (!) 24   Ht 5' 5" (1.651 m)   Wt 68.9 kg (152 lb)   SpO2 99%   BMI 25.29 kg/m²                 Clinical " Impression:  Final diagnoses:  [R07.9] Chest pain  [I50.9] Acute exacerbation of CHF (congestive heart failure)  [R79.89] Elevated troponin (Primary)          ED Disposition Condition    Observation Stable                Alyssa Scott MD  01/27/25 4987       Alyssa Scott MD  01/27/25 9025

## 2025-01-28 VITALS
RESPIRATION RATE: 20 BRPM | WEIGHT: 152 LBS | HEIGHT: 65 IN | OXYGEN SATURATION: 98 % | HEART RATE: 108 BPM | TEMPERATURE: 97 F | BODY MASS INDEX: 25.33 KG/M2 | SYSTOLIC BLOOD PRESSURE: 123 MMHG | DIASTOLIC BLOOD PRESSURE: 90 MMHG

## 2025-01-28 PROBLEM — I50.9 ACUTE EXACERBATION OF CHF (CONGESTIVE HEART FAILURE): Status: ACTIVE | Noted: 2025-01-28

## 2025-01-28 LAB
ALBUMIN SERPL-MCNC: 3.3 G/DL (ref 3.5–5)
ALBUMIN/GLOB SERPL: 1.2 RATIO (ref 1.1–2)
ALP SERPL-CCNC: 46 UNIT/L (ref 40–150)
ALT SERPL-CCNC: 14 UNIT/L (ref 0–55)
ANION GAP SERPL CALC-SCNC: 12 MEQ/L
AST SERPL-CCNC: 17 UNIT/L (ref 5–34)
BASOPHILS # BLD AUTO: 0.04 X10(3)/MCL
BASOPHILS NFR BLD AUTO: 0.7 %
BILIRUB SERPL-MCNC: 1.7 MG/DL
BUN SERPL-MCNC: 8.4 MG/DL (ref 8.4–25.7)
CALCIUM SERPL-MCNC: 8.6 MG/DL (ref 8.4–10.2)
CHLORIDE SERPL-SCNC: 108 MMOL/L (ref 98–107)
CO2 SERPL-SCNC: 22 MMOL/L (ref 22–29)
CREAT SERPL-MCNC: 0.99 MG/DL (ref 0.72–1.25)
CREAT/UREA NIT SERPL: 8
EOSINOPHIL # BLD AUTO: 0.34 X10(3)/MCL (ref 0–0.9)
EOSINOPHIL NFR BLD AUTO: 5.7 %
ERYTHROCYTE [DISTWIDTH] IN BLOOD BY AUTOMATED COUNT: 14.2 % (ref 11.5–17)
GFR SERPLBLD CREATININE-BSD FMLA CKD-EPI: >60 ML/MIN/1.73/M2
GLOBULIN SER-MCNC: 2.8 GM/DL (ref 2.4–3.5)
GLUCOSE SERPL-MCNC: 91 MG/DL (ref 74–100)
HCT VFR BLD AUTO: 40.1 % (ref 42–52)
HGB BLD-MCNC: 12.8 G/DL (ref 14–18)
IMM GRANULOCYTES # BLD AUTO: 0.02 X10(3)/MCL (ref 0–0.04)
IMM GRANULOCYTES NFR BLD AUTO: 0.3 %
LYMPHOCYTES # BLD AUTO: 1.44 X10(3)/MCL (ref 0.6–4.6)
LYMPHOCYTES NFR BLD AUTO: 24.2 %
MAGNESIUM SERPL-MCNC: 2.1 MG/DL (ref 1.6–2.6)
MCH RBC QN AUTO: 25.8 PG (ref 27–31)
MCHC RBC AUTO-ENTMCNC: 31.9 G/DL (ref 33–36)
MCV RBC AUTO: 80.7 FL (ref 80–94)
MONOCYTES # BLD AUTO: 0.51 X10(3)/MCL (ref 0.1–1.3)
MONOCYTES NFR BLD AUTO: 8.6 %
NEUTROPHILS # BLD AUTO: 3.61 X10(3)/MCL (ref 2.1–9.2)
NEUTROPHILS NFR BLD AUTO: 60.5 %
NRBC BLD AUTO-RTO: 0 %
PLATELET # BLD AUTO: 198 X10(3)/MCL (ref 130–400)
PMV BLD AUTO: 11.3 FL (ref 7.4–10.4)
POTASSIUM SERPL-SCNC: 3.1 MMOL/L (ref 3.5–5.1)
PROT SERPL-MCNC: 6.1 GM/DL (ref 6.4–8.3)
RBC # BLD AUTO: 4.97 X10(6)/MCL (ref 4.7–6.1)
SODIUM SERPL-SCNC: 142 MMOL/L (ref 136–145)
WBC # BLD AUTO: 5.96 X10(3)/MCL (ref 4.5–11.5)

## 2025-01-28 PROCEDURE — 36415 COLL VENOUS BLD VENIPUNCTURE: CPT | Performed by: NURSE PRACTITIONER

## 2025-01-28 PROCEDURE — 85025 COMPLETE CBC W/AUTO DIFF WBC: CPT | Performed by: NURSE PRACTITIONER

## 2025-01-28 PROCEDURE — G0378 HOSPITAL OBSERVATION PER HR: HCPCS

## 2025-01-28 PROCEDURE — 80053 COMPREHEN METABOLIC PANEL: CPT | Performed by: NURSE PRACTITIONER

## 2025-01-28 PROCEDURE — 83735 ASSAY OF MAGNESIUM: CPT | Performed by: NURSE PRACTITIONER

## 2025-01-28 PROCEDURE — 25000003 PHARM REV CODE 250: Performed by: NURSE PRACTITIONER

## 2025-01-28 RX ORDER — FUROSEMIDE 20 MG/1
20 TABLET ORAL 2 TIMES DAILY
Qty: 60 TABLET | Refills: 3 | Status: SHIPPED | OUTPATIENT
Start: 2025-01-28 | End: 2026-01-28

## 2025-01-28 RX ADMIN — ASPIRIN 81 MG CHEWABLE TABLET 81 MG: 81 TABLET CHEWABLE at 08:01

## 2025-01-28 RX ADMIN — ATORVASTATIN CALCIUM 40 MG: 40 TABLET, FILM COATED ORAL at 08:01

## 2025-01-28 RX ADMIN — TICAGRELOR 90 MG: 90 TABLET ORAL at 08:01

## 2025-01-28 RX ADMIN — METOPROLOL SUCCINATE 25 MG: 25 TABLET, EXTENDED RELEASE ORAL at 08:01

## 2025-01-28 RX ADMIN — DAPAGLIFLOZIN 10 MG: 10 TABLET, FILM COATED ORAL at 08:01

## 2025-01-28 NOTE — PLAN OF CARE
01/28/25 1041   Discharge Assessment   Assessment Type Discharge Planning Assessment   Confirmed/corrected address, phone number and insurance Yes   Confirmed Demographics Correct on Facesheet   Source of Information patient   Communicated CHERELLE with patient/caregiver Date not available/Unable to determine   Reason For Admission chest pain, CHF, syncope   People in Home spouse  (pt lives with his wife, and 2 dependent children (17 & 20y/o) in a single story home with no steps to enter the home)   Do you expect to return to your current living situation? Yes   Do you have help at home or someone to help you manage your care at home? Yes   Who are your caregiver(s) and their phone number(s)? pt's wife, Radha or mother, Tierra   Prior to hospitilization cognitive status: Unable to Assess   Current cognitive status: Alert/Oriented   Walking or Climbing Stairs Difficulty no   Dressing/Bathing Difficulty no   Home Layout Able to live on 1st floor   Equipment Currently Used at Home none   Readmission within 30 days? No   Patient currently being followed by outpatient case management? No   Do you currently have service(s) that help you manage your care at home? No   Do you take prescription medications? Yes   Do you have prescription coverage? Yes   Coverage BCBS   Who is going to help you get home at discharge? wife, Radha or mother, Tierra   How do you get to doctors appointments? car, drives self   Are you on dialysis? No   Discharge Plan A Home with family   Discharge Plan B Home   DME Needed Upon Discharge  none   Discharge Plan discussed with: Patient;Parent(s)   Name(s) and Number(s) Tierra mother   Transition of Care Barriers None   Housing Stability   In the last 12 months, was there a time when you were not able to pay the mortgage or rent on time? N   Transportation Needs   Has the lack of transportation kept you from medical appointments, meetings, work or from getting things needed for daily living? No    Food Insecurity   Within the past 12 months, you worried that your food would run out before you got the money to buy more. Never true   OTHER   Name(s) of People in Home Radha, wife and 2 children     Pt's PCP is Dr Erasto Ramirez. Pt's  is his wife, Radha [phone]). Pt has never had HH services. Pt uses [facility] off of [address]. Pt does drive. Pt has no dc needs at this time.

## 2025-01-28 NOTE — DISCHARGE SUMMARY
[facility] *    Cardiology  Discharge Summary      Patient Name: Canelo Blank  MRN: [id number]  Admission Date: 1/27/2025  Hospital Length of Stay: 0 days  Discharge Date and Time:  01/28/2025 12:47 PM  Attending Physician: Lori att. providers found  Discharging Provider: uAdra Martinez NP  Primary Care Physician: Lori, Primary Doctor    Chief Complaint:  Chest Pain & cough     HPI: 49-year-old male that is known to [facility]/Dr. Perry with a PMHx of STEMI, ICMO (35-40%), CAD, HLD, Subarachnoid hemorrhage. He presented to the ER with complaints of chest pain with complaints of a productive cough that began about a week ago. He also reported some orthopnea and PND. On Exam He denied SOB and report his BLE dramatically improved. On exam he was walking arounf the room when he got light headed and passed out. He hit his head and back on the wall he was found to be hypotensive. Head and spine CT showed no acute finding. [facility] was consulted for Chest pain and CHF.       Hospital Course:   1.28.25: NAD, VSS. He denies SOB CP, palpations, or nausea. He reports he feels much better.      PMH: Subarachnoid Hemorrhage, Headaches, CAD/Stents, ICMO/EF 35-40%  PSH: Appendectomy, L Finger Fracture Repair   Family History: Mother, L, HTN; Father, L, Esophageal Cancer  Social History: Denies Illicit Drug, ETOH and Tobacco Use      Previous Cardiac Diagnostics:   ECHO (12.22.24):  Left Ventricle: The left ventricle is normal in size. Moderately increased wall thickness. Regional wall motion abnormalities present.  akinetic ant wall apex setum and distal lateral c/w LAD occulsion There is moderately reduced systolic function with a visually estimated ejection fraction of 35 - 40%. Grade I diastolic dysfunction.  Right Ventricle: Normal right ventricular cavity size. Systolic function is borderline low.  Aortic Valve: The aortic valve is a trileaflet valve.  Mitral Valve: Mildly calcified leaflets. There is trace  regurgitation.  IVC/SVC: Normal venous pressure at 3 mmHg.  Pericardium: There is no pericardial effusion. Left pleural effusion.     [facility] (12.22.24):  Coronary findings:  Dominance: right   LM:  No obstructive disease with less than 10% epicardial stenosis  LAD:  100% thrombotic occlusion in the proximal LAD with KENIA 0 flow.  Ramus intermedius vessel/high diagonal 1 branch:  No obstructive coronary artery disease with less than 10% epicardial stenosis  LCx:  No obstructive disease with less than 10% epicardial stenosis  RCA:  No obstructive disease with less than 10% epicardial stenosis  Impression:   Successful IVUS guided PTCA, mechanical thrombectomy and insertion of a drug-eluting stent to the proximal LAD from 100% thrombotic occlusion down to 0% residual stenosis and restoration of KENIA 3 flow  No obstructive coronary artery disease in the ramus intermedius/high diagonal 1, left circumflex coronary artery, RCA  Right dominant system  Elevated LVEDP of 30 mmHg   Plan:  Aspirin and Brilinta uninterrupted  IV Lasix  Echocardiogram  Monitor right radial access site    Consults:   Final Active Diagnoses:    Diagnosis Date Noted POA    PRINCIPAL PROBLEM:  Acute exacerbation of CHF (congestive heart failure) [I50.9] 01/28/2025 Unknown      Problems Resolved During this Admission:     Discharged Condition: good    Review of Systems   Constitutional: Negative for chills and fever.   Cardiovascular:  Negative for chest pain, near-syncope and syncope.   Respiratory:  Negative for cough and shortness of breath.    Musculoskeletal: Negative.  Negative for back pain.   Gastrointestinal:  Negative for abdominal pain, nausea and vomiting.   Neurological: Negative.    All other systems reviewed and are negative.      Physical Exam  Constitutional:       General: He is not in acute distress.     Appearance: Normal appearance.   HENT:      Head: Normocephalic.      Mouth/Throat:      Mouth: Mucous membranes are moist.    Cardiovascular:      Rate and Rhythm: Normal rate and regular rhythm.      Pulses: Normal pulses.      Heart sounds: No murmur heard.  Pulmonary:      Effort: Pulmonary effort is normal. No respiratory distress.      Breath sounds: Normal breath sounds.   Skin:     General: Skin is warm.   Neurological:      Mental Status: He is alert and oriented to person, place, and time.   Psychiatric:         Mood and Affect: Mood normal.         Disposition: Home or Self Care  Follow Up:   Follow-up Information       Crow Perry MD Follow up in 7 day(s).    Specialty: Cardiology  Why: wednesday, febuary 12th @2:10pm  Contact information:  [address]  [address]  [facility] [address]  [phone]               Erasto Ramirez MD Follow up.    Specialty: Family Medicine  Why: >please call your pcp for an appointment in a week's time  Contact information:  [address]  [facility] [address]  [phone]                           Patient Instructions:      Diet Cardiac     Medications:  Reconciled Home Medications:      Medication List        START taking these medications      furosemide 20 MG tablet  Commonly known as: LASIX  Take 1 tablet (20 mg total) by mouth 2 (two) times daily.            CONTINUE taking these medications      acetaminophen 325 MG tablet  Commonly known as: TYLENOL  Take 325 mg by mouth every 6 (six) hours as needed for Pain.     aspirin 81 MG EC tablet  Commonly known as: ECOTRIN  Take 1 tablet (81 mg total) by mouth once daily.     atorvastatin 40 MG tablet  Commonly known as: LIPITOR  Take 1 tablet (40 mg total) by mouth once daily.     dapagliflozin propanediol 10 mg tablet  Commonly known as: Farxiga  Take 1 tablet (10 mg total) by mouth once daily.     fexofenadine 180 MG tablet  Commonly known as: ALLEGRA  Take 180 mg by mouth once daily.     gabapentin 300 MG capsule  Commonly known as: NEURONTIN  Take 1 capsule (300 mg total) by mouth every evening.      metoprolol succinate 25 MG 24 hr tablet  Commonly known as: TOPROL-XL  Take 1 tablet (25 mg total) by mouth once daily.     pantoprazole 40 MG tablet  Commonly known as: PROTONIX  Take 1 tablet (40 mg total) by mouth 2 (two) times daily for 14 days, THEN 1 tablet (40 mg total) once daily.  Start taking on: December 25, 2024     sucralfate 1 gram tablet  Commonly known as: CARAFATE  Take 1 tablet (1 g total) by mouth 4 (four) times daily before meals and nightly.     ticagrelor 90 mg tablet  Commonly known as: BRILINTA  Take 1 tablet (90 mg total) by mouth 2 (two) times a day.              Impression:  ICMO ~ LVEF 35-40%   - ECHO (12.22.24) - LVEF 35-40%, Akinetic Anterior Wall [place] Septum and Distal Lateral  Syncope   Chest Pain   CAD / Hx of STEMI    - [facility] (12.22.24): Successful IVUS guided PTCA, Mechanical Thrombectomy and Insertion of a LUCA to the Prox LAD from 100% Thrombotic Occlusion Down to 0% residual stenosis and restoration of KENIA 3 Flow. No Obstructive CAD in the RI/High Diag 1, LCX, RCA; Right Dominant System  HTN  HLD   Hx of Brain Aneurysm   Shell Fish Allergy  No Hx of GIB     Plan:   Chest and Spine CT reviewed ~ no acute findings  Cont. ASA, Brilinta, & Statin  Cont. GDMT for CHF: Farxiga 10mg, Metoprolol succ 25mg, & Farxiga 10mg   Discussed Lifevest with the patient, He wanted some time to think about if he would like to use the life vest due to his career as a ~ will follow up in clinic   Will start Lasix 20mg PO daily   Okay to discharge   Will arrange for followup in 7 days             Time spent on the discharge of patient: 32 minutes    Audra Martinez NP  Cardiology  [facility]

## 2025-01-28 NOTE — PROGRESS NOTES
VSS no complaints Tele and IV d/c d/c instructions reviewed and all questions answered. Pt home with family

## 2025-02-03 ENCOUNTER — LAB VISIT (OUTPATIENT)
Dept: LAB | Facility: HOSPITAL | Age: 50
End: 2025-02-03
Attending: NURSE PRACTITIONER
Payer: COMMERCIAL

## 2025-02-03 DIAGNOSIS — I25.5 ISCHEMIC CARDIOMYOPATHY: ICD-10-CM

## 2025-02-03 DIAGNOSIS — I25.10 CORONARY ATHEROSCLEROSIS OF NATIVE CORONARY ARTERY: Primary | ICD-10-CM

## 2025-02-03 DIAGNOSIS — I21.3 MYOCARDIAL NECROSIS SYNDROME: ICD-10-CM

## 2025-02-03 LAB
ANION GAP SERPL CALC-SCNC: -6 MEQ/L
BUN SERPL-MCNC: 15 MG/DL (ref 8.4–25.7)
CALCIUM SERPL-MCNC: 8.8 MG/DL (ref 8.4–10.2)
CHLORIDE SERPL-SCNC: 102 MMOL/L (ref 98–107)
CO2 SERPL-SCNC: 27 MMOL/L (ref 22–29)
CREAT SERPL-MCNC: 1.02 MG/DL (ref 0.72–1.25)
CREAT/UREA NIT SERPL: 15
GFR SERPLBLD CREATININE-BSD FMLA CKD-EPI: >60 ML/MIN/1.73/M2
GLUCOSE SERPL-MCNC: 92 MG/DL (ref 74–100)
POTASSIUM SERPL-SCNC: 3 MMOL/L (ref 3.5–5.1)
SODIUM SERPL-SCNC: 123 MMOL/L (ref 136–145)

## 2025-02-03 PROCEDURE — 36415 COLL VENOUS BLD VENIPUNCTURE: CPT

## 2025-02-03 PROCEDURE — 80048 BASIC METABOLIC PNL TOTAL CA: CPT

## 2025-02-20 ENCOUNTER — LAB VISIT (OUTPATIENT)
Dept: LAB | Facility: HOSPITAL | Age: 50
End: 2025-02-20
Attending: INTERNAL MEDICINE
Payer: COMMERCIAL

## 2025-02-20 DIAGNOSIS — I21.3 MYOCARDIAL NECROSIS SYNDROME: ICD-10-CM

## 2025-02-20 DIAGNOSIS — I25.10 CORONARY ATHEROSCLEROSIS OF NATIVE CORONARY ARTERY: Primary | ICD-10-CM

## 2025-02-20 DIAGNOSIS — I25.5 ISCHEMIC CARDIOMYOPATHY: ICD-10-CM

## 2025-02-20 LAB
BNP BLD-MCNC: 637.8 PG/ML
CHOLEST SERPL-MCNC: 144 MG/DL
CHOLEST/HDLC SERPL: 3 {RATIO} (ref 0–5)
HDLC SERPL-MCNC: 42 MG/DL (ref 35–60)
LDLC SERPL CALC-MCNC: 92 MG/DL (ref 50–140)
TRIGL SERPL-MCNC: 51 MG/DL (ref 34–140)
VLDLC SERPL CALC-MCNC: 10 MG/DL

## 2025-02-20 PROCEDURE — 36415 COLL VENOUS BLD VENIPUNCTURE: CPT

## 2025-02-20 PROCEDURE — 83880 ASSAY OF NATRIURETIC PEPTIDE: CPT

## 2025-02-20 PROCEDURE — 80061 LIPID PANEL: CPT

## 2025-02-21 DIAGNOSIS — I25.10 ASCVD (ARTERIOSCLEROTIC CARDIOVASCULAR DISEASE): Primary | ICD-10-CM

## 2025-02-21 DIAGNOSIS — I25.5 ISCHEMIC CARDIOMYOPATHY: ICD-10-CM

## 2025-02-21 DIAGNOSIS — I50.22 CHRONIC SYSTOLIC HEART FAILURE: ICD-10-CM

## 2025-03-03 ENCOUNTER — HOSPITAL ENCOUNTER (OUTPATIENT)
Dept: RADIOLOGY | Facility: HOSPITAL | Age: 50
Discharge: HOME | End: 2025-03-03
Attending: INTERNAL MEDICINE
Payer: COMMERCIAL

## 2025-03-03 DIAGNOSIS — I50.22 CHRONIC SYSTOLIC HEART FAILURE: ICD-10-CM

## 2025-03-03 DIAGNOSIS — I25.10 ASCVD (ARTERIOSCLEROTIC CARDIOVASCULAR DISEASE): ICD-10-CM

## 2025-03-03 DIAGNOSIS — I25.5 ISCHEMIC CARDIOMYOPATHY: ICD-10-CM

## 2025-03-03 PROCEDURE — A9560 TC99M LABELED RBC: HCPCS | Performed by: INTERNAL MEDICINE

## 2025-03-03 PROCEDURE — 78494 HEART IMAGE SPECT: CPT

## 2025-03-03 RX ADMIN — TECHNETIUM TC 99M-LABELED RED BLOOD CELLS 26.7 MILLICURIE: KIT at 08:03

## 2025-04-11 ENCOUNTER — HOSPITAL ENCOUNTER (EMERGENCY)
Facility: HOSPITAL | Age: 50
Discharge: LEFT BEFORE DISCHARGE | End: 2025-04-11
Attending: EMERGENCY MEDICINE
Payer: COMMERCIAL

## 2025-04-11 VITALS
BODY MASS INDEX: 25.95 KG/M2 | RESPIRATION RATE: 22 BRPM | HEIGHT: 64 IN | WEIGHT: 152 LBS | TEMPERATURE: 98 F | HEART RATE: 77 BPM | SYSTOLIC BLOOD PRESSURE: 125 MMHG | OXYGEN SATURATION: 99 % | DIASTOLIC BLOOD PRESSURE: 89 MMHG

## 2025-04-11 DIAGNOSIS — R07.9 ACUTE CHEST PAIN: ICD-10-CM

## 2025-04-11 DIAGNOSIS — R06.02 SHORTNESS OF BREATH: ICD-10-CM

## 2025-04-11 DIAGNOSIS — R07.9 CHEST PAIN: ICD-10-CM

## 2025-04-11 DIAGNOSIS — I25.10 CORONARY ARTERY DISEASE, UNSPECIFIED VESSEL OR LESION TYPE, UNSPECIFIED WHETHER ANGINA PRESENT, UNSPECIFIED WHETHER NATIVE OR TRANSPLANTED HEART: ICD-10-CM

## 2025-04-11 DIAGNOSIS — J90 PLEURAL EFFUSION: Primary | ICD-10-CM

## 2025-04-11 DIAGNOSIS — I25.5 ISCHEMIC CARDIOMYOPATHY: ICD-10-CM

## 2025-04-11 LAB
ALBUMIN SERPL-MCNC: 3.8 G/DL (ref 3.5–5)
ALBUMIN/GLOB SERPL: 1.3 RATIO (ref 1.1–2)
ALP SERPL-CCNC: 51 UNIT/L (ref 40–150)
ALT SERPL-CCNC: 32 UNIT/L (ref 0–55)
ANION GAP SERPL CALC-SCNC: 8 MEQ/L
AST SERPL-CCNC: 24 UNIT/L (ref 11–45)
BASOPHILS # BLD AUTO: 0.05 X10(3)/MCL
BASOPHILS NFR BLD AUTO: 0.8 %
BILIRUB SERPL-MCNC: 1.9 MG/DL
BNP BLD-MCNC: 648.1 PG/ML
BUN SERPL-MCNC: 15.5 MG/DL (ref 8.4–25.7)
CALCIUM SERPL-MCNC: 8.5 MG/DL (ref 8.4–10.2)
CHLORIDE SERPL-SCNC: 108 MMOL/L (ref 98–107)
CO2 SERPL-SCNC: 23 MMOL/L (ref 22–29)
CREAT SERPL-MCNC: 1.06 MG/DL (ref 0.72–1.25)
CREAT/UREA NIT SERPL: 15
D DIMER PPP IA.FEU-MCNC: 0.53 UG/ML FEU (ref 0–0.5)
EOSINOPHIL # BLD AUTO: 0.31 X10(3)/MCL (ref 0–0.9)
EOSINOPHIL NFR BLD AUTO: 5.1 %
ERYTHROCYTE [DISTWIDTH] IN BLOOD BY AUTOMATED COUNT: 14.1 % (ref 11.5–17)
GFR SERPLBLD CREATININE-BSD FMLA CKD-EPI: >60 ML/MIN/1.73/M2
GLOBULIN SER-MCNC: 3 GM/DL (ref 2.4–3.5)
GLUCOSE SERPL-MCNC: 101 MG/DL (ref 74–100)
HCT VFR BLD AUTO: 41.6 % (ref 42–52)
HGB BLD-MCNC: 13.4 G/DL (ref 14–18)
IMM GRANULOCYTES # BLD AUTO: 0.01 X10(3)/MCL (ref 0–0.04)
IMM GRANULOCYTES NFR BLD AUTO: 0.2 %
LYMPHOCYTES # BLD AUTO: 1.28 X10(3)/MCL (ref 0.6–4.6)
LYMPHOCYTES NFR BLD AUTO: 21.1 %
MCH RBC QN AUTO: 25.9 PG (ref 27–31)
MCHC RBC AUTO-ENTMCNC: 32.2 G/DL (ref 33–36)
MCV RBC AUTO: 80.5 FL (ref 80–94)
MONOCYTES # BLD AUTO: 0.39 X10(3)/MCL (ref 0.1–1.3)
MONOCYTES NFR BLD AUTO: 6.4 %
NEUTROPHILS # BLD AUTO: 4.03 X10(3)/MCL (ref 2.1–9.2)
NEUTROPHILS NFR BLD AUTO: 66.4 %
NRBC BLD AUTO-RTO: 0 %
PLATELET # BLD AUTO: 211 X10(3)/MCL (ref 130–400)
PMV BLD AUTO: 10.9 FL (ref 7.4–10.4)
POTASSIUM SERPL-SCNC: 3.3 MMOL/L (ref 3.5–5.1)
PROT SERPL-MCNC: 6.8 GM/DL (ref 6.4–8.3)
RBC # BLD AUTO: 5.17 X10(6)/MCL (ref 4.7–6.1)
SODIUM SERPL-SCNC: 139 MMOL/L (ref 136–145)
TROPONIN I SERPL-MCNC: 0.02 NG/ML (ref 0–0.04)
TROPONIN I SERPL-MCNC: 0.02 NG/ML (ref 0–0.04)
WBC # BLD AUTO: 6.07 X10(3)/MCL (ref 4.5–11.5)

## 2025-04-11 PROCEDURE — 84484 ASSAY OF TROPONIN QUANT: CPT | Performed by: EMERGENCY MEDICINE

## 2025-04-11 PROCEDURE — 63600175 PHARM REV CODE 636 W HCPCS: Mod: JZ,TB

## 2025-04-11 PROCEDURE — 93005 ELECTROCARDIOGRAM TRACING: CPT

## 2025-04-11 PROCEDURE — 96375 TX/PRO/DX INJ NEW DRUG ADDON: CPT

## 2025-04-11 PROCEDURE — 25500020 PHARM REV CODE 255: Performed by: EMERGENCY MEDICINE

## 2025-04-11 PROCEDURE — 84484 ASSAY OF TROPONIN QUANT: CPT | Performed by: STUDENT IN AN ORGANIZED HEALTH CARE EDUCATION/TRAINING PROGRAM

## 2025-04-11 PROCEDURE — 85025 COMPLETE CBC W/AUTO DIFF WBC: CPT | Performed by: STUDENT IN AN ORGANIZED HEALTH CARE EDUCATION/TRAINING PROGRAM

## 2025-04-11 PROCEDURE — 96374 THER/PROPH/DIAG INJ IV PUSH: CPT

## 2025-04-11 PROCEDURE — 25000003 PHARM REV CODE 250: Performed by: EMERGENCY MEDICINE

## 2025-04-11 PROCEDURE — 83880 ASSAY OF NATRIURETIC PEPTIDE: CPT | Performed by: STUDENT IN AN ORGANIZED HEALTH CARE EDUCATION/TRAINING PROGRAM

## 2025-04-11 PROCEDURE — 80053 COMPREHEN METABOLIC PANEL: CPT | Performed by: STUDENT IN AN ORGANIZED HEALTH CARE EDUCATION/TRAINING PROGRAM

## 2025-04-11 PROCEDURE — 93010 ELECTROCARDIOGRAM REPORT: CPT | Mod: ,,, | Performed by: INTERNAL MEDICINE

## 2025-04-11 PROCEDURE — 85379 FIBRIN DEGRADATION QUANT: CPT | Performed by: EMERGENCY MEDICINE

## 2025-04-11 PROCEDURE — 99285 EMERGENCY DEPT VISIT HI MDM: CPT | Mod: 25

## 2025-04-11 PROCEDURE — 63600175 PHARM REV CODE 636 W HCPCS: Performed by: EMERGENCY MEDICINE

## 2025-04-11 RX ORDER — METHYLPREDNISOLONE SOD SUCC 125 MG
125 VIAL (EA) INJECTION
Status: COMPLETED | OUTPATIENT
Start: 2025-04-11 | End: 2025-04-11

## 2025-04-11 RX ORDER — DIPHENHYDRAMINE HYDROCHLORIDE 50 MG/ML
INJECTION, SOLUTION INTRAMUSCULAR; INTRAVENOUS
Status: COMPLETED
Start: 2025-04-11 | End: 2025-04-11

## 2025-04-11 RX ORDER — METHYLPREDNISOLONE SOD SUCC 125 MG
VIAL (EA) INJECTION
Status: COMPLETED
Start: 2025-04-11 | End: 2025-04-11

## 2025-04-11 RX ORDER — DIPHENHYDRAMINE HYDROCHLORIDE 50 MG/ML
25 INJECTION, SOLUTION INTRAMUSCULAR; INTRAVENOUS
Status: COMPLETED | OUTPATIENT
Start: 2025-04-11 | End: 2025-04-11

## 2025-04-11 RX ORDER — FUROSEMIDE 10 MG/ML
40 INJECTION INTRAMUSCULAR; INTRAVENOUS
Status: COMPLETED | OUTPATIENT
Start: 2025-04-11 | End: 2025-04-11

## 2025-04-11 RX ORDER — NAPROXEN SODIUM 220 MG/1
324 TABLET, FILM COATED ORAL
Status: COMPLETED | OUTPATIENT
Start: 2025-04-11 | End: 2025-04-11

## 2025-04-11 RX ADMIN — IOHEXOL 77 ML: 350 INJECTION, SOLUTION INTRAVENOUS at 07:04

## 2025-04-11 RX ADMIN — DIPHENHYDRAMINE HYDROCHLORIDE 25 MG: 50 INJECTION, SOLUTION INTRAMUSCULAR; INTRAVENOUS at 06:04

## 2025-04-11 RX ADMIN — ASPIRIN 324 MG: 81 TABLET, CHEWABLE ORAL at 07:04

## 2025-04-11 RX ADMIN — DIPHENHYDRAMINE HYDROCHLORIDE 25 MG: 50 INJECTION INTRAMUSCULAR; INTRAVENOUS at 06:04

## 2025-04-11 RX ADMIN — FUROSEMIDE 40 MG: 10 INJECTION, SOLUTION INTRAVENOUS at 07:04

## 2025-04-11 RX ADMIN — METHYLPREDNISOLONE SODIUM SUCCINATE 125 MG: 125 INJECTION, POWDER, FOR SOLUTION INTRAMUSCULAR; INTRAVENOUS at 07:04

## 2025-04-11 RX ADMIN — Medication 125 MG: at 07:04

## 2025-04-11 NOTE — DISCHARGE INSTRUCTIONS
If you change your mind about further evaluation and treatment police returned to the emergency department.  If you have anymore tightness or feel short of breath or lightheaded you need to return right away.

## 2025-04-11 NOTE — ED PROVIDER NOTES
Encounter Date: 4/11/2025    SCRIBE #1 NOTE: I, Tera Matta, am scribing for, and in the presence of,  Mookie Bazzi MD. I have scribed the following portions of the note - Other sections scribed: HPI,ROS,PE.       History     Chief Complaint   Patient presents with    Shortness of Breath     Sob, cp onset this am while at work      50 y/o male with PMHx of MI, CAD s.p. stents, HTN, and HLD presents to ED c/o chest pain onset ~02:00 this morning. Pt reports he was at work, when he got upset at a coworker, and started to have chest pain. He states the pain felt like a tightness in the middle of his chest. He reports an associated shortness of breath. He denies any headache, vision changes, nausea, or numbness/tingling in his arms or face. He reports this episode feels different than previous MI. He reports he takes Brilinta, Metoprolol, Aspirin, and Lasix.     The history is provided by the patient and medical records.     Review of patient's allergies indicates:   Allergen Reactions    Shellfish containing products Anaphylaxis     Past Medical History:   Diagnosis Date    Headache     Subarachnoid hemorrhage 2022     Past Surgical History:   Procedure Laterality Date    ANGIOGRAM, CORONARY, WITH LEFT HEART CATHETERIZATION  12/22/2024    Procedure: Angiogram, Coronary, with Left Heart Cath;  Surgeon: Osvaldo Dyer MD;  Location: [facility] CATH LAB;  Service: Cardiology;;    APPENDECTOMY      CORONARY STENT PLACEMENT N/A 12/22/2024    Procedure: INSERTION, STENT, CORONARY ARTERY;  Surgeon: Osvaldo Dyer MD;  Location: [facility] CATH LAB;  Service: Cardiology;  Laterality: N/A;    FRACTURE SURGERY Left     pinky finger    IVUS, CORONARY N/A 12/22/2024    Procedure: IVUS, Coronary;  Surgeon: Osvaldo Dyer MD;  Location: [facility] CATH LAB;  Service: Cardiology;  Laterality: N/A;    THROMBECTOMY, CORONARY  12/22/2024    Procedure: Thrombectomy, Coronary;  Surgeon: Osvaldo Dyer MD;  Location: [facility] CATH LAB;  Service:  Cardiology;;     Family History   Problem Relation Name Age of Onset    Hypertension Mother      Esophageal cancer Father       Social History[1]  Review of Systems   Constitutional:  Negative for chills and fever.   Eyes:  Negative for visual disturbance.   Respiratory:  Positive for shortness of breath. Negative for cough.    Cardiovascular:  Positive for chest pain (substernal chest pain feels like a tightness).   Gastrointestinal:  Negative for abdominal pain, nausea and vomiting.   Musculoskeletal:  Negative for myalgias.   Neurological:  Negative for syncope, weakness, numbness and headaches.   All other systems reviewed and are negative.      Physical Exam     Initial Vitals [04/11/25 0335]   BP Pulse Resp Temp SpO2   132/87 82 18 97.7 °F (36.5 °C) 99 %      MAP       --         Physical Exam    Constitutional: He appears well-developed and well-nourished. No distress.   HENT:   Head: Normocephalic and atraumatic.   Cardiovascular:  Normal rate, regular rhythm and normal heart sounds.           Pulmonary/Chest: No respiratory distress. He has no wheezes. He has no rhonchi. He exhibits no tenderness.   Abdominal: Abdomen is soft. He exhibits no distension. There is no abdominal tenderness. There is no rebound and no guarding.   Musculoskeletal:         General: Normal range of motion.     Neurological: He is alert and oriented to person, place, and time. He has normal strength. GCS score is 15. GCS eye subscore is 4. GCS verbal subscore is 5. GCS motor subscore is 6.   Skin: Skin is warm and dry.   Psychiatric: He has a normal mood and affect.         ED Course   Procedures  Labs Reviewed   COMPREHENSIVE METABOLIC PANEL - Abnormal       Result Value    Sodium 139      Potassium 3.3 (*)     Chloride 108 (*)     CO2 23      Glucose 101 (*)     Blood Urea Nitrogen 15.5      Creatinine 1.06      Calcium 8.5      Protein Total 6.8      Albumin 3.8      Globulin 3.0      Albumin/Globulin Ratio 1.3      Bilirubin  Total 1.9 (*)     ALP 51      ALT 32      AST 24      eGFR >60      Anion Gap 8.0      BUN/Creatinine Ratio 15     B-TYPE NATRIURETIC PEPTIDE - Abnormal    Natriuretic Peptide 648.1 (*)    CBC WITH DIFFERENTIAL - Abnormal    WBC 6.07      RBC 5.17      Hgb 13.4 (*)     Hct 41.6 (*)     MCV 80.5      MCH 25.9 (*)     MCHC 32.2 (*)     RDW 14.1      Platelet 211      MPV 10.9 (*)     Neut % 66.4      Lymph % 21.1      Mono % 6.4      Eos % 5.1      Basophil % 0.8      Imm Grans % 0.2      Neut # 4.03      Lymph # 1.28      Mono # 0.39      Eos # 0.31      Baso # 0.05      Imm Gran # 0.01      NRBC% 0.0     D DIMER, QUANTITATIVE - Abnormal    D-Dimer 0.53 (*)    TROPONIN I - Normal    Troponin-I 0.017     TROPONIN I - Normal    Troponin-I 0.023     CBC W/ AUTO DIFFERENTIAL    Narrative:     The following orders were created for panel order CBC auto differential.  Procedure                               Abnormality         Status                     ---------                               -----------         ------                     CBC with Differential[[id number]]       Abnormal            Final result                 Please view results for these tests on the individual orders.     EKG Readings: (Independently Interpreted)   Initial Reading: No STEMI. Rhythm: Normal Sinus Rhythm. Heart Rate: 92. Ectopy: No Ectopy. Conduction: 1st Degree AV Block. ST Segments: Normal ST Segments. T Waves: Normal. Axis: Normal.   Taken at 03:35     ECG Results              EKG 12-lead (Preliminary result)  Result time 04/11/25 08:39:20      Wet Read by Mookie Bazzi MD (04/11/25 08:39:20, [facility] - Emergency Dept, Emergency Medicine)    335.  92 beats per minute sinus rhythm first-degree AV block poor R-wave progression                                   Imaging Results              CTA Chest Non-Coronary (PE Studies) (Final result)  Result time 04/11/25 07:32:23      Final result by Tee Smalls MD  (04/11/25 07:32:23)                   Impression:      No pulmonary embolism identified.    Small right pleural effusion.  Possible mild pulmonary edema.    Nonspecific 4 mm solid right upper lobe nodule.  If the patient is at increased risk for lung malignancy, and no remote CT scans of the chest are available to document stability, a one year followup chest CT can be considered for surveillance purposes. If there is no increased risk for lung malignancy, no further followup is necessary for this nodule.      Electronically signed by: Tee Smalls  Date:    04/11/2025  Time:    07:32               Narrative:    EXAMINATION:  CTA CHEST NON CORONARY (PE STUDIES)    CLINICAL HISTORY:  Pulmonary embolism (PE) suspected, positive D-dimer;    TECHNIQUE:  CTA imaging of the chest after IV contrast. Axial, coronal and sagittal reconstructions, including MIP images, are reviewed. Dose length product 262 mGycm. Automatic exposure control, adjustment of mA/kV or iterative reconstruction technique used to limit radiation dose.    COMPARISON:  No relevant comparison studies available at the time of dictation.    FINDINGS:  Diagnostic quality: Adequate    Pulmonary embolism: None identified.    Lung parenchyma: Areas of mild right lower lung atelectasis.  Additional mild ground-glass in the lower portion of both lungs.  4 mm solid nodule in the right upper lobe (series 12, image 29).    Pleural effusion: Small right pleural effusion.    Mediastinum/patel: Heart borderline enlarged.  LAD stent.  No pathologically enlarged lymph node.    Chest wall/axilla: No significant findings.    Upper abdomen: No significant findings.    Bones: No acute osseous process.                                       X-Ray Chest AP (In process)                      Medications   methylPREDNISolone sodium succinate injection 125 mg (125 mg Intravenous Given 4/11/25 0700)   diphenhydrAMINE injection 25 mg (25 mg Intravenous Given 4/11/25 0657)   iohexoL  (OMNIPAQUE 350) injection 77 mL (77 mLs Intravenous Given 4/11/25 0704)   furosemide injection 40 mg (40 mg Intravenous Given 4/11/25 0737)   aspirin chewable tablet 324 mg (324 mg Oral Given 4/11/25 0737)     Medical Decision Making  The differential diagnosis includes, but is not limited to, MI, dysrhythmia, and panic attack.     Amount and/or Complexity of Data Reviewed  Labs: ordered. Decision-making details documented in ED Course.  Radiology: ordered. Decision-making details documented in ED Course.  ECG/medicine tests: ordered and independent interpretation performed.     Details: Initial Reading: No STEMI. Rhythm: Normal Sinus Rhythm. Heart Rate: 92. Ectopy: No Ectopy. Conduction: 1st Degree AV Block. ST Segments: Normal ST Segments. T Waves: Normal. Axis: Normal.   Taken at 03:35    Risk  OTC drugs.  Prescription drug management.            Scribe Attestation:   Scribe #1: I performed the above scribed service and the documentation accurately describes the services I performed. I attest to the accuracy of the note.    Attending Attestation:           Physician Attestation for Scribe:  Physician Attestation Statement for Scribe #1: I, Mookie Bazzi MD, reviewed documentation, as scribed by Tera Matta in my presence, and it is both accurate and complete.             ED Course as of 04/11/25 0857   Fri Apr 11, 2025   0812 CT negative for pulmonary embolus.  Small effusion present patient has already had Lasix here [LF]   0841 Patient has a history of CAD status post PCI 12/22/2024.  Echocardiogram from 12/22/2024 showed EF 35-40%.  3 hour repeat troponin has increased still within normal limit but it has raised.  Discussed with [facility] he is known to their service.  They will see in consultation with the admission to medicine [LF]   0841 CT with some pleural effusion BNP is elevated getting diuresed [LF]   0842 Patient be admitted for further cardiac evaluation and further diuresis [LF]   0846 I discussed  with the patient's CT finding troponin trending up I recommend he be admitted for cardiac monitoring further testing repeat troponins echocardiogram.  I explained all this feels different than his previous heart attack it still could have been from blockage or it could have been from a dysrhythmia does if his ejection fraction is worsening that would put him at risk of dysrhythmias.  Discussed in layman's terms that if that were to happen he could pass out and die or he could develop stroke with permanent brain damage.  He is wife expressed understanding [LF]      ED Course User Index  [LF] Mookie Bazzi MD                           Clinical Impression:  Final diagnoses:  [R06.02] Shortness of breath  [J90] Pleural effusion (Primary)  [I25.5] Ischemic cardiomyopathy  [I25.10] Coronary artery disease, unspecified vessel or lesion type, unspecified whether angina present, unspecified whether native or transplanted heart  [R07.9] Acute chest pain  [R07.9] Chest pain          ED Disposition Condition    AMA Stable                    [1]   Social History  Tobacco Use    Smoking status: Never     Passive exposure: Never    Smokeless tobacco: Never   Substance Use Topics    Alcohol use: Not Currently    Drug use: Never        Mookie Bazzi MD  04/11/25 0857

## 2025-04-12 LAB
OHS QRS DURATION: 98 MS
OHS QTC CALCULATION: 469 MS

## 2025-04-21 DIAGNOSIS — I60.9 SUBARACHNOID HEMORRHAGE: ICD-10-CM

## 2025-04-21 DIAGNOSIS — R51.9 NONINTRACTABLE EPISODIC HEADACHE, UNSPECIFIED HEADACHE TYPE: ICD-10-CM

## 2025-04-21 RX ORDER — GABAPENTIN 300 MG/1
300 CAPSULE ORAL NIGHTLY
Qty: 90 CAPSULE | Refills: 3 | Status: SHIPPED | OUTPATIENT
Start: 2025-04-21

## 2025-08-26 ENCOUNTER — TELEPHONE (OUTPATIENT)
Dept: NEUROLOGY | Facility: CLINIC | Age: 50
End: 2025-08-26
Payer: COMMERCIAL

## (undated) DEVICE — KIT MANIFOLD LOW PRESS TUBING

## (undated) DEVICE — BAND TR COMP DEVICE REG 24CM

## (undated) DEVICE — GUIDE LAUNCHER 6FR EBU 3.0

## (undated) DEVICE — GUIDEWIRE INQWIRE SE 3MM JTIP

## (undated) DEVICE — CANNULA ADULT NASAL 7FT

## (undated) DEVICE — CATH IMPULSE MP 5FR 145CM

## (undated) DEVICE — INTRODUCER TRNSRADIAL 6F 10CM

## (undated) DEVICE — KIT INDIGO CATH RX ASP 140CM

## (undated) DEVICE — CATH EMERGE MR 15 X 2.00

## (undated) DEVICE — CANISTER INDIGO ENGINE

## (undated) DEVICE — SET ANGIO ACIST CVI ANGIOTOUCH

## (undated) DEVICE — TUBING HPCIL ROT M/F ADPT 72IN

## (undated) DEVICE — PAD DEFIB CADENCE ADULT R2

## (undated) DEVICE — CATH OPTITORQUE RADIAL 5FR

## (undated) DEVICE — DRAPE ANGIO BRACH 38X44IN

## (undated) DEVICE — SHEATH INTRODUCER 6FR 11CM

## (undated) DEVICE — WASTEBAG FLD MGMT 1 SPIK F

## (undated) DEVICE — KIT MINI STK MAX COAX 5FR 10CM

## (undated) DEVICE — Device

## (undated) DEVICE — VALVE CONTROL COPILOT

## (undated) DEVICE — CATH EAGLE EYE PLATINUM

## (undated) DEVICE — GUIDEWIRE RUNTHROUGH EF 180CM

## (undated) DEVICE — CATH NC EMERGE MR 5.0X12MM